# Patient Record
Sex: MALE | Race: WHITE | Employment: OTHER | URBAN - METROPOLITAN AREA
[De-identification: names, ages, dates, MRNs, and addresses within clinical notes are randomized per-mention and may not be internally consistent; named-entity substitution may affect disease eponyms.]

---

## 2018-02-23 ENCOUNTER — HOSPITAL ENCOUNTER (OUTPATIENT)
Dept: OTHER | Age: 48
Discharge: HOME | End: 2018-02-23
Attending: ANESTHESIOLOGY | Admitting: ANESTHESIOLOGY

## 2018-02-23 DIAGNOSIS — M54.2 NECK PAIN: ICD-10-CM

## 2018-02-23 DIAGNOSIS — M25.562 ACUTE PAIN OF LEFT KNEE: ICD-10-CM

## 2020-02-10 ENCOUNTER — HOSPITAL ENCOUNTER (EMERGENCY)
Age: 50
Discharge: HOME | DRG: 194 | End: 2020-02-10
Attending: EMERGENCY MEDICINE
Payer: MEDICAID

## 2020-02-10 ENCOUNTER — APPOINTMENT (OUTPATIENT)
Dept: CT IMAGING | Age: 50
DRG: 194 | End: 2020-02-10
Payer: MEDICAID

## 2020-02-10 VITALS
BODY MASS INDEX: 29.52 KG/M2 | OXYGEN SATURATION: 96 % | SYSTOLIC BLOOD PRESSURE: 117 MMHG | TEMPERATURE: 98.4 F | HEART RATE: 69 BPM | DIASTOLIC BLOOD PRESSURE: 85 MMHG | HEIGHT: 74 IN | WEIGHT: 230 LBS | RESPIRATION RATE: 16 BRPM

## 2020-02-10 LAB
A/G RATIO: 1.3 (ref 1.1–2.2)
ALBUMIN SERPL-MCNC: 4.4 G/DL (ref 3.4–5)
ALP BLD-CCNC: 83 U/L (ref 40–129)
ALT SERPL-CCNC: 27 U/L (ref 10–40)
ANION GAP SERPL CALCULATED.3IONS-SCNC: 14 MMOL/L (ref 3–16)
AST SERPL-CCNC: 18 U/L (ref 15–37)
BACTERIA: ABNORMAL /HPF
BASOPHILS ABSOLUTE: 0 K/UL (ref 0–0.2)
BASOPHILS RELATIVE PERCENT: 0.5 %
BILIRUB SERPL-MCNC: 0.6 MG/DL (ref 0–1)
BILIRUBIN URINE: ABNORMAL
BLOOD, URINE: ABNORMAL
BUN BLDV-MCNC: 14 MG/DL (ref 7–20)
CALCIUM SERPL-MCNC: 9.7 MG/DL (ref 8.3–10.6)
CHLORIDE BLD-SCNC: 101 MMOL/L (ref 99–110)
CLARITY: CLEAR
CO2: 22 MMOL/L (ref 21–32)
COLOR: YELLOW
CREAT SERPL-MCNC: 0.8 MG/DL (ref 0.9–1.3)
EKG ATRIAL RATE: 78 BPM
EKG DIAGNOSIS: NORMAL
EKG P AXIS: 30 DEGREES
EKG P-R INTERVAL: 196 MS
EKG Q-T INTERVAL: 468 MS
EKG QRS DURATION: 168 MS
EKG QTC CALCULATION (BAZETT): 533 MS
EKG R AXIS: -86 DEGREES
EKG T AXIS: 60 DEGREES
EKG VENTRICULAR RATE: 78 BPM
EOSINOPHILS ABSOLUTE: 0.3 K/UL (ref 0–0.6)
EOSINOPHILS RELATIVE PERCENT: 3.4 %
EPITHELIAL CELLS, UA: ABNORMAL /HPF
GFR AFRICAN AMERICAN: >60
GFR NON-AFRICAN AMERICAN: >60
GLOBULIN: 3.4 G/DL
GLUCOSE BLD-MCNC: 125 MG/DL (ref 70–99)
GLUCOSE URINE: NEGATIVE MG/DL
HCT VFR BLD CALC: 49.5 % (ref 40.5–52.5)
HEMOGLOBIN: 16.6 G/DL (ref 13.5–17.5)
KETONES, URINE: NEGATIVE MG/DL
LEUKOCYTE ESTERASE, URINE: NEGATIVE
LIPASE: 18 U/L (ref 13–60)
LYMPHOCYTES ABSOLUTE: 2.1 K/UL (ref 1–5.1)
LYMPHOCYTES RELATIVE PERCENT: 26.7 %
MCH RBC QN AUTO: 31.5 PG (ref 26–34)
MCHC RBC AUTO-ENTMCNC: 33.6 G/DL (ref 31–36)
MCV RBC AUTO: 93.9 FL (ref 80–100)
MICROSCOPIC EXAMINATION: YES
MONOCYTES ABSOLUTE: 0.6 K/UL (ref 0–1.3)
MONOCYTES RELATIVE PERCENT: 7.7 %
MUCUS: ABNORMAL /LPF
NEUTROPHILS ABSOLUTE: 4.9 K/UL (ref 1.7–7.7)
NEUTROPHILS RELATIVE PERCENT: 61.7 %
NITRITE, URINE: NEGATIVE
PDW BLD-RTO: 12.3 % (ref 12.4–15.4)
PH UA: 5 (ref 5–8)
PLATELET # BLD: 180 K/UL (ref 135–450)
PMV BLD AUTO: 10.2 FL (ref 5–10.5)
POTASSIUM REFLEX MAGNESIUM: 4 MMOL/L (ref 3.5–5.1)
PROTEIN UA: ABNORMAL MG/DL
RBC # BLD: 5.26 M/UL (ref 4.2–5.9)
RBC UA: ABNORMAL /HPF (ref 0–2)
SODIUM BLD-SCNC: 137 MMOL/L (ref 136–145)
SPECIFIC GRAVITY UA: >=1.03 (ref 1–1.03)
TOTAL PROTEIN: 7.8 G/DL (ref 6.4–8.2)
TROPONIN: <0.01 NG/ML
URINE REFLEX TO CULTURE: ABNORMAL
URINE TYPE: ABNORMAL
UROBILINOGEN, URINE: 0.2 E.U./DL
WBC # BLD: 8 K/UL (ref 4–11)
WBC UA: ABNORMAL /HPF (ref 0–5)

## 2020-02-10 PROCEDURE — 71260 CT THORAX DX C+: CPT

## 2020-02-10 PROCEDURE — 96375 TX/PRO/DX INJ NEW DRUG ADDON: CPT

## 2020-02-10 PROCEDURE — 6360000004 HC RX CONTRAST MEDICATION: Performed by: PHYSICIAN ASSISTANT

## 2020-02-10 PROCEDURE — 81001 URINALYSIS AUTO W/SCOPE: CPT

## 2020-02-10 PROCEDURE — 6360000002 HC RX W HCPCS: Performed by: PHYSICIAN ASSISTANT

## 2020-02-10 PROCEDURE — 36415 COLL VENOUS BLD VENIPUNCTURE: CPT

## 2020-02-10 PROCEDURE — 85025 COMPLETE CBC W/AUTO DIFF WBC: CPT

## 2020-02-10 PROCEDURE — 74177 CT ABD & PELVIS W/CONTRAST: CPT

## 2020-02-10 PROCEDURE — 2500000003 HC RX 250 WO HCPCS: Performed by: PHYSICIAN ASSISTANT

## 2020-02-10 PROCEDURE — 83690 ASSAY OF LIPASE: CPT

## 2020-02-10 PROCEDURE — 93010 ELECTROCARDIOGRAM REPORT: CPT | Performed by: INTERNAL MEDICINE

## 2020-02-10 PROCEDURE — 99285 EMERGENCY DEPT VISIT HI MDM: CPT

## 2020-02-10 PROCEDURE — 93005 ELECTROCARDIOGRAM TRACING: CPT | Performed by: PHYSICIAN ASSISTANT

## 2020-02-10 PROCEDURE — 2580000003 HC RX 258: Performed by: PHYSICIAN ASSISTANT

## 2020-02-10 PROCEDURE — 96374 THER/PROPH/DIAG INJ IV PUSH: CPT

## 2020-02-10 PROCEDURE — 80053 COMPREHEN METABOLIC PANEL: CPT

## 2020-02-10 PROCEDURE — 84484 ASSAY OF TROPONIN QUANT: CPT

## 2020-02-10 RX ORDER — KETOROLAC TROMETHAMINE 30 MG/ML
15 INJECTION, SOLUTION INTRAMUSCULAR; INTRAVENOUS ONCE
Status: COMPLETED | OUTPATIENT
Start: 2020-02-10 | End: 2020-02-10

## 2020-02-10 RX ORDER — 0.9 % SODIUM CHLORIDE 0.9 %
1000 INTRAVENOUS SOLUTION INTRAVENOUS ONCE
Status: COMPLETED | OUTPATIENT
Start: 2020-02-10 | End: 2020-02-10

## 2020-02-10 RX ORDER — ONDANSETRON 2 MG/ML
4 INJECTION INTRAMUSCULAR; INTRAVENOUS ONCE
Status: COMPLETED | OUTPATIENT
Start: 2020-02-10 | End: 2020-02-10

## 2020-02-10 RX ORDER — ONDANSETRON 4 MG/1
4 TABLET, FILM COATED ORAL EVERY 8 HOURS PRN
Qty: 10 TABLET | Refills: 0 | Status: SHIPPED | OUTPATIENT
Start: 2020-02-10 | End: 2020-02-12

## 2020-02-10 RX ADMIN — FAMOTIDINE 20 MG: 10 INJECTION, SOLUTION INTRAVENOUS at 14:12

## 2020-02-10 RX ADMIN — IOPAMIDOL 75 ML: 755 INJECTION, SOLUTION INTRAVENOUS at 14:01

## 2020-02-10 RX ADMIN — ONDANSETRON HYDROCHLORIDE 4 MG: 2 INJECTION, SOLUTION INTRAMUSCULAR; INTRAVENOUS at 14:11

## 2020-02-10 RX ADMIN — KETOROLAC TROMETHAMINE 15 MG: 30 INJECTION, SOLUTION INTRAMUSCULAR; INTRAVENOUS at 15:22

## 2020-02-10 RX ADMIN — SODIUM CHLORIDE 1000 ML: 9 INJECTION, SOLUTION INTRAVENOUS at 14:10

## 2020-02-10 ASSESSMENT — ENCOUNTER SYMPTOMS
SPUTUM PRODUCTION: 0
BLOOD IN STOOL: 0
NAUSEA: 1
COUGH: 0
SHORTNESS OF BREATH: 1
ABDOMINAL PAIN: 1
VOMITING: 0
DIARRHEA: 0

## 2020-02-10 ASSESSMENT — PAIN SCALES - GENERAL
PAINLEVEL_OUTOF10: 7
PAINLEVEL_OUTOF10: 0
PAINLEVEL_OUTOF10: 6

## 2020-02-10 ASSESSMENT — PAIN DESCRIPTION - DESCRIPTORS: DESCRIPTORS: SHARP

## 2020-02-10 ASSESSMENT — PAIN DESCRIPTION - PROGRESSION: CLINICAL_PROGRESSION: GRADUALLY WORSENING

## 2020-02-10 ASSESSMENT — PAIN - FUNCTIONAL ASSESSMENT: PAIN_FUNCTIONAL_ASSESSMENT: ACTIVITIES ARE NOT PREVENTED

## 2020-02-10 ASSESSMENT — PAIN DESCRIPTION - LOCATION: LOCATION: ABDOMEN

## 2020-02-10 ASSESSMENT — PAIN DESCRIPTION - ORIENTATION: ORIENTATION: RIGHT;LOWER

## 2020-02-10 ASSESSMENT — PAIN DESCRIPTION - FREQUENCY: FREQUENCY: CONTINUOUS

## 2020-02-10 NOTE — ED NOTES
Pt states he does feel worse and nauseated after ct. Medicated as ordered.       Lexx Rivera RN  02/10/20 2305
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
## 2020-02-12 ENCOUNTER — APPOINTMENT (OUTPATIENT)
Dept: GENERAL RADIOLOGY | Age: 50
DRG: 194 | End: 2020-02-12
Payer: MEDICAID

## 2020-02-12 ENCOUNTER — APPOINTMENT (OUTPATIENT)
Dept: CT IMAGING | Age: 50
DRG: 194 | End: 2020-02-12
Payer: MEDICAID

## 2020-02-12 ENCOUNTER — HOSPITAL ENCOUNTER (INPATIENT)
Age: 50
LOS: 3 days | Discharge: HOME | DRG: 194 | End: 2020-02-15
Attending: EMERGENCY MEDICINE | Admitting: HOSPITALIST
Payer: MEDICAID

## 2020-02-12 PROBLEM — I50.43 CHF (CONGESTIVE HEART FAILURE), NYHA CLASS I, ACUTE ON CHRONIC, COMBINED (HCC): Status: ACTIVE | Noted: 2020-02-12

## 2020-02-12 LAB
A/G RATIO: 1.5 (ref 1.1–2.2)
ALBUMIN SERPL-MCNC: 4.4 G/DL (ref 3.4–5)
ALP BLD-CCNC: 80 U/L (ref 40–129)
ALT SERPL-CCNC: 44 U/L (ref 10–40)
ANION GAP SERPL CALCULATED.3IONS-SCNC: 12 MMOL/L (ref 3–16)
AST SERPL-CCNC: 28 U/L (ref 15–37)
BASOPHILS ABSOLUTE: 0.1 K/UL (ref 0–0.2)
BASOPHILS RELATIVE PERCENT: 0.7 %
BILIRUB SERPL-MCNC: 0.9 MG/DL (ref 0–1)
BUN BLDV-MCNC: 14 MG/DL (ref 7–20)
CALCIUM SERPL-MCNC: 9.4 MG/DL (ref 8.3–10.6)
CHLORIDE BLD-SCNC: 94 MMOL/L (ref 99–110)
CO2: 24 MMOL/L (ref 21–32)
CREAT SERPL-MCNC: 0.9 MG/DL (ref 0.9–1.3)
D DIMER: 827 NG/ML DDU (ref 0–229)
EKG ATRIAL RATE: 97 BPM
EKG DIAGNOSIS: NORMAL
EKG P AXIS: 44 DEGREES
EKG P-R INTERVAL: 186 MS
EKG Q-T INTERVAL: 390 MS
EKG QRS DURATION: 142 MS
EKG QTC CALCULATION (BAZETT): 495 MS
EKG R AXIS: -75 DEGREES
EKG T AXIS: 42 DEGREES
EKG VENTRICULAR RATE: 97 BPM
EOSINOPHILS ABSOLUTE: 0.1 K/UL (ref 0–0.6)
EOSINOPHILS RELATIVE PERCENT: 0.8 %
GFR AFRICAN AMERICAN: >60
GFR NON-AFRICAN AMERICAN: >60
GLOBULIN: 3 G/DL
GLUCOSE BLD-MCNC: 110 MG/DL (ref 70–99)
HCT VFR BLD CALC: 45.8 % (ref 40.5–52.5)
HEMOGLOBIN: 15.7 G/DL (ref 13.5–17.5)
LYMPHOCYTES ABSOLUTE: 1.4 K/UL (ref 1–5.1)
LYMPHOCYTES RELATIVE PERCENT: 12.9 %
MCH RBC QN AUTO: 32.4 PG (ref 26–34)
MCHC RBC AUTO-ENTMCNC: 34.3 G/DL (ref 31–36)
MCV RBC AUTO: 94.3 FL (ref 80–100)
MONOCYTES ABSOLUTE: 0.7 K/UL (ref 0–1.3)
MONOCYTES RELATIVE PERCENT: 5.9 %
NEUTROPHILS ABSOLUTE: 8.9 K/UL (ref 1.7–7.7)
NEUTROPHILS RELATIVE PERCENT: 79.7 %
PDW BLD-RTO: 12.5 % (ref 12.4–15.4)
PLATELET # BLD: 153 K/UL (ref 135–450)
PMV BLD AUTO: 10.8 FL (ref 5–10.5)
POTASSIUM REFLEX MAGNESIUM: 4.1 MMOL/L (ref 3.5–5.1)
PRO-BNP: 1364 PG/ML (ref 0–124)
RBC # BLD: 4.86 M/UL (ref 4.2–5.9)
SODIUM BLD-SCNC: 130 MMOL/L (ref 136–145)
TOTAL PROTEIN: 7.4 G/DL (ref 6.4–8.2)
TROPONIN: <0.01 NG/ML
TROPONIN: <0.01 NG/ML
WBC # BLD: 11.2 K/UL (ref 4–11)

## 2020-02-12 PROCEDURE — 93005 ELECTROCARDIOGRAM TRACING: CPT | Performed by: EMERGENCY MEDICINE

## 2020-02-12 PROCEDURE — 99285 EMERGENCY DEPT VISIT HI MDM: CPT

## 2020-02-12 PROCEDURE — 96374 THER/PROPH/DIAG INJ IV PUSH: CPT

## 2020-02-12 PROCEDURE — 85025 COMPLETE CBC W/AUTO DIFF WBC: CPT

## 2020-02-12 PROCEDURE — 80053 COMPREHEN METABOLIC PANEL: CPT

## 2020-02-12 PROCEDURE — 93010 ELECTROCARDIOGRAM REPORT: CPT | Performed by: INTERNAL MEDICINE

## 2020-02-12 PROCEDURE — 36415 COLL VENOUS BLD VENIPUNCTURE: CPT

## 2020-02-12 PROCEDURE — 71260 CT THORAX DX C+: CPT

## 2020-02-12 PROCEDURE — 2060000000 HC ICU INTERMEDIATE R&B

## 2020-02-12 PROCEDURE — 83880 ASSAY OF NATRIURETIC PEPTIDE: CPT

## 2020-02-12 PROCEDURE — 71046 X-RAY EXAM CHEST 2 VIEWS: CPT

## 2020-02-12 PROCEDURE — 85379 FIBRIN DEGRADATION QUANT: CPT

## 2020-02-12 PROCEDURE — 6360000004 HC RX CONTRAST MEDICATION: Performed by: NURSE PRACTITIONER

## 2020-02-12 PROCEDURE — 84484 ASSAY OF TROPONIN QUANT: CPT

## 2020-02-12 PROCEDURE — 6360000002 HC RX W HCPCS: Performed by: NURSE PRACTITIONER

## 2020-02-12 RX ORDER — FUROSEMIDE 10 MG/ML
20 INJECTION INTRAMUSCULAR; INTRAVENOUS 2 TIMES DAILY
Status: DISCONTINUED | OUTPATIENT
Start: 2020-02-13 | End: 2020-02-13

## 2020-02-12 RX ORDER — SODIUM CHLORIDE 0.9 % (FLUSH) 0.9 %
10 SYRINGE (ML) INJECTION EVERY 12 HOURS SCHEDULED
Status: DISCONTINUED | OUTPATIENT
Start: 2020-02-12 | End: 2020-02-15 | Stop reason: HOSPADM

## 2020-02-12 RX ORDER — ONDANSETRON 2 MG/ML
4 INJECTION INTRAMUSCULAR; INTRAVENOUS EVERY 6 HOURS PRN
Status: DISCONTINUED | OUTPATIENT
Start: 2020-02-12 | End: 2020-02-15 | Stop reason: HOSPADM

## 2020-02-12 RX ORDER — FUROSEMIDE 10 MG/ML
40 INJECTION INTRAMUSCULAR; INTRAVENOUS ONCE
Status: COMPLETED | OUTPATIENT
Start: 2020-02-12 | End: 2020-02-12

## 2020-02-12 RX ORDER — ASPIRIN 81 MG/1
81 TABLET, CHEWABLE ORAL DAILY
Status: DISCONTINUED | OUTPATIENT
Start: 2020-02-13 | End: 2020-02-15 | Stop reason: HOSPADM

## 2020-02-12 RX ORDER — SODIUM CHLORIDE 0.9 % (FLUSH) 0.9 %
10 SYRINGE (ML) INJECTION PRN
Status: DISCONTINUED | OUTPATIENT
Start: 2020-02-12 | End: 2020-02-15 | Stop reason: HOSPADM

## 2020-02-12 RX ORDER — ATORVASTATIN CALCIUM 40 MG/1
40 TABLET, FILM COATED ORAL NIGHTLY
Status: DISCONTINUED | OUTPATIENT
Start: 2020-02-12 | End: 2020-02-15 | Stop reason: HOSPADM

## 2020-02-12 RX ADMIN — FUROSEMIDE 40 MG: 10 INJECTION, SOLUTION INTRAMUSCULAR; INTRAVENOUS at 18:21

## 2020-02-12 RX ADMIN — IOPAMIDOL 85 ML: 755 INJECTION, SOLUTION INTRAVENOUS at 19:21

## 2020-02-12 ASSESSMENT — ENCOUNTER SYMPTOMS
SORE THROAT: 0
COLOR CHANGE: 0
SHORTNESS OF BREATH: 1
ABDOMINAL PAIN: 0
RHINORRHEA: 0

## 2020-02-12 ASSESSMENT — PAIN DESCRIPTION - DESCRIPTORS: DESCRIPTORS: ACHING

## 2020-02-12 ASSESSMENT — PAIN SCALES - GENERAL
PAINLEVEL_OUTOF10: 4
PAINLEVEL_OUTOF10: 4

## 2020-02-12 ASSESSMENT — PAIN DESCRIPTION - LOCATION: LOCATION: ABDOMEN

## 2020-02-12 NOTE — ED PROVIDER NOTES
[address] ED  eMERGENCY dEPARTMENT eNCOUnter        Pt Name: Juliano Fields  MRN: [id number]  [place] 1970  Date of evaluation: 2/12/2020  Provider: ERVIN Manzo CNP  PCP: No primary care provider on file. ED Attending: No att. providers found    [address]       Chief Complaint   Patient presents with    Shortness of Breath     since thrusday. denies cp. nonproductive cough    Fall     on thursday no loc       HISTORY OF PRESENT ILLNESS   (Location/Symptom, Timing/Onset, Context/Setting, Quality, Duration, Modifying Factors, Severity)  Note limiting factors. Juliano Fields is a 47 y.o. male for shortness of breath. Onset was 1 week. Duration has been since the onset. Context includes pt states he has had sob for the past week. He states it is worse when he lays flat. Alleviating factors include nothing. Aggravating factors include nothing. Pain is 4/10. nothing has been used for pain today. Nursing Notes were all reviewed and agreed with or any disagreements were addressed  in the HPI. REVIEW OF SYSTEMS  (2-9 systems for level 4, 10 or more for level 5)     Review of Systems   Constitutional: Negative for fever. HENT: Negative for congestion, rhinorrhea and sore throat. Respiratory: Positive for shortness of breath. Cardiovascular: Negative for chest pain. Gastrointestinal: Negative for abdominal pain. Genitourinary: Negative for decreased urine volume and difficulty urinating. Musculoskeletal: Negative for arthralgias and myalgias. Skin: Negative for color change and rash. Neurological: Negative for dizziness and light-headedness. Psychiatric/Behavioral: Negative for agitation. All other systems reviewed and are negative. Positivesand Pertinent negatives as per HPI. Except as noted above in the ROS, all other systems were reviewed and negative.        PAST MEDICAL HISTORY     Past Medical History:   Diagnosis Date  CAD (coronary artery disease)     Heart attack ([facility] [address]     Neck pain          SURGICAL HISTORY       Past Surgical History:   Procedure Laterality Date    CARDIAC SURGERY           CURRENT MEDICATIONS       Previous Medications    TRAMADOL (ULTRAM) 50 MG TABLET    Take 100 mg by mouth every 8 hours as needed for Pain. ALLERGIES     Patient has no known allergies. FAMILY HISTORY     No family history on file. SOCIAL HISTORY       Social History     Socioeconomic History    Marital status:      Spouse name: Not on file    Number of children: Not on file    Years of education: Not on file    Highest education level: Not on file   Occupational History    Not on file   Social Needs    Financial resource strain: Not on file    Food insecurity:     Worry: Not on file     Inability: Not on file    Transportation needs:     Medical: Not on file     Non-medical: Not on file   Tobacco Use    Smoking status: Current Every Day Smoker     Packs/day: 1.00     Years: 28.00     Pack years: 28.00     Types: Cigarettes    Smokeless tobacco: Never Used   Substance and Sexual Activity    Alcohol use:  Yes     Alcohol/week: 2.0 standard drinks     Types: 2 Shots of liquor per week    Drug use: No    Sexual activity: Yes     Partners: Female   Lifestyle    Physical activity:     Days per week: Not on file     Minutes per session: Not on file    Stress: Not on file   Relationships    Social connections:     Talks on phone: Not on file     Gets together: Not on file     Attends Hinduism service: Not on file     Active member of club or organization: Not on file     Attends meetings of clubs or organizations: Not on file     Relationship status: Not on file    Intimate partner violence:     Fear of current or ex partner: Not on file     Emotionally abused: Not on file     Physically abused: Not on file     Forced sexual activity: Not on file   Other Topics Concern    Not on file   Social History within normal limits    Narrative:     Performed at:  [facility]) - [place] [address],  [address] [place] [address]   Phone [phone]   BRAIN NATRIURETIC PEPTIDE - Abnormal; Notable for the following components:    Pro-BNP 1,364 (*)     All other components within normal limits    Narrative:     Performed at:  [place] [address],  [address] [place] [address]   Phone [phone]   D-DIMER, QUANTITATIVE - Abnormal; Notable for the following components:    D-Dimer, Quant 827 (*)     All other components within normal limits    Narrative:     Performed at:  [address]   Phone [phone]   TROPONIN    Narrative:     Performed at:  [facility]) - [address]   Phone [phone]       All other labs were within normal range or notreturned as of this dictation. EKG: All EKG's are interpreted by the Emergency Department Physician who either signs or Co-signs this chart in the absence of a cardiologist.  Please see their note for interpretation of EKG. RADIOLOGY:   Chest x-ray interpreted by radiologist for findings suggest congestive heart failure. CTA of his chest for pulmonary embolism interpreted by radiologist for  Impression:    No evidence of pulmonary embolism to the proximal segmental level. Cardiomegaly with interval development of small bilateral pleural effusions  and pulmonary edema. Interpretation per the Radiologist below, if available at the time of this note:    CT CHEST PULMONARY EMBOLISM W CONTRAST   Final Result   No evidence of pulmonary embolism to the proximal segmental level. Cardiomegaly with interval development of small bilateral pleural effusions   and pulmonary edema.          XR CHEST STANDARD (2 VW)   Final Result   Findings suggest congestive heart failure Xr Chest Standard (2 Vw)    Result Date: 2/12/2020  EXAMINATION: TWO XRAY VIEWS OF THE CHEST 2/12/2020 4:40 pm COMPARISON: 02/10/2020 chest CT HISTORY: ORDERING SYSTEM PROVIDED HISTORY: sob TECHNOLOGIST PROVIDED HISTORY: Reason for exam:->sob Reason for Exam: Shortness of Breath (since thrusday. denies cp. nonproductive cough) Acuity: Acute Type of Exam: Initial FINDINGS: Cardiomegaly. Pulmonary vascular congestion. Pulmonary edema. No focal pulmonary consolidation. No pleural effusion. Findings suggest congestive heart failure         PROCEDURES   Unless otherwise noted below, none     Procedures    CRITICAL CARE TIME   N/A    CONSULTS:  IP CONSULT TO HOSPITALIST  IP CONSULT TO DIETITIAN  IP CONSULT TO CARDIOLOGY      EMERGENCY DEPARTMENT COURSE and DIFFERENTIAL DIAGNOSIS/MDM:   Vitals:    Vitals:    02/12/20 1624 02/12/20 1628   BP:  (!) 132/96   Pulse:  88   Resp:  26   Temp:  98.3 °F (36.8 °C)   TempSrc:  Oral   SpO2:  96%   Weight: 230 lb (104.3 kg)    Height: 6' 2\" (1.88 m)        Patient was given the following medications:  Medications   furosemide (LASIX) injection 40 mg (40 mg Intravenous Given 2/12/20 1821)   iopamidol (ISOVUE-370) 76 % injection 85 mL (85 mLs Intravenous Given 2/12/20 1921)       Patient was seen and evaluated by Dr. Eh Elliott and myself. Patient here today for complaints of shortness of breath. Patient reports he has had shortness of breath for approximately a week. He also reports he is not been able to lay flat when he sleeps. Patient denies any chest pain. On exam he is awake and alert he is having slight shortness of breath with talking but states it is worse when he ambulates. Lab values have been reviewed and interpreted. Patient did have an elevated BNP at 1364. He was given Lasix and his IV for this.   He did also have an elevated d-dimer and a CT of his chest was negative for PE however it was concerning for cardiomegaly with bilateral pleural effusions and pulmonary edema. Consult was placed to the hospitalist for admission. Hospitalist is accepted. Patient's care was transitioned to the inpatient unit at this time. The patient tolerated their visit well. They were seen and evaluated by the attendingphysician, No att. providers found who agreed with the assessment and plan. The patient and / or the family were informed of the results of any tests, a time was given to answer questions, a plan was proposed and they agreed Lauro Alba. FINAL IMPRESSION      1. Acute pulmonary edema (HCC)    2. Cardiomegaly    3. Dyspnea on exertion    4. Elevated brain natriuretic peptide (BNP) level          DISPOSITION/PLAN   DISPOSITION Admitted 02/12/2020 06:35:32 PM      PATIENT REFERRED TO:  No follow-up provider specified.     DISCHARGE MEDICATIONS:  New Prescriptions    No medications on file       DISCONTINUED MEDICATIONS:  Discontinued Medications    ONDANSETRON (ZOFRAN) 4 MG TABLET    Take 1 tablet by mouth every 8 hours as needed for Nausea              (Please note that portions of this note were completed with a voice recognition program.  Efforts were made to edit the dictations but occasionally words are mis-transcribed.)    ERVIN Ohara CNP (electronically signed)       ERVIN Ohara CNP  02/12/20 9272

## 2020-02-13 ENCOUNTER — APPOINTMENT (OUTPATIENT)
Dept: NUCLEAR MEDICINE | Age: 50
DRG: 194 | End: 2020-02-13
Payer: MEDICAID

## 2020-02-13 LAB
ANION GAP SERPL CALCULATED.3IONS-SCNC: 15 MMOL/L (ref 3–16)
BUN BLDV-MCNC: 14 MG/DL (ref 7–20)
CALCIUM SERPL-MCNC: 9 MG/DL (ref 8.3–10.6)
CHLORIDE BLD-SCNC: 96 MMOL/L (ref 99–110)
CO2: 21 MMOL/L (ref 21–32)
CREAT SERPL-MCNC: 0.9 MG/DL (ref 0.9–1.3)
EKG ATRIAL RATE: 95 BPM
EKG DIAGNOSIS: NORMAL
EKG P AXIS: 58 DEGREES
EKG P-R INTERVAL: 194 MS
EKG Q-T INTERVAL: 400 MS
EKG QRS DURATION: 144 MS
EKG QTC CALCULATION (BAZETT): 502 MS
EKG R AXIS: -79 DEGREES
EKG T AXIS: 36 DEGREES
EKG VENTRICULAR RATE: 95 BPM
GFR AFRICAN AMERICAN: >60
GFR NON-AFRICAN AMERICAN: >60
GLUCOSE BLD-MCNC: 100 MG/DL (ref 70–99)
LV EF: 23 %
LV EF: 27 %
LVEF MODALITY: NORMAL
LVEF MODALITY: NORMAL
MAGNESIUM: 2.3 MG/DL (ref 1.8–2.4)
POTASSIUM SERPL-SCNC: 4.7 MMOL/L (ref 3.5–5.1)
REASON FOR REJECTION: NORMAL
REJECTED TEST: NORMAL
SODIUM BLD-SCNC: 132 MMOL/L (ref 136–145)
T4 FREE: 1.4 NG/DL (ref 0.9–1.8)
TROPONIN: <0.01 NG/ML
TSH SERPL DL<=0.05 MIU/L-ACNC: 6.15 UIU/ML (ref 0.27–4.2)

## 2020-02-13 PROCEDURE — 83735 ASSAY OF MAGNESIUM: CPT

## 2020-02-13 PROCEDURE — 84439 ASSAY OF FREE THYROXINE: CPT

## 2020-02-13 PROCEDURE — 6370000000 HC RX 637 (ALT 250 FOR IP): Performed by: HOSPITALIST

## 2020-02-13 PROCEDURE — 99223 1ST HOSP IP/OBS HIGH 75: CPT | Performed by: INTERNAL MEDICINE

## 2020-02-13 PROCEDURE — 84484 ASSAY OF TROPONIN QUANT: CPT

## 2020-02-13 PROCEDURE — 80048 BASIC METABOLIC PNL TOTAL CA: CPT

## 2020-02-13 PROCEDURE — 6360000002 HC RX W HCPCS: Performed by: INTERNAL MEDICINE

## 2020-02-13 PROCEDURE — 6370000000 HC RX 637 (ALT 250 FOR IP): Performed by: NURSE PRACTITIONER

## 2020-02-13 PROCEDURE — 2060000000 HC ICU INTERMEDIATE R&B

## 2020-02-13 PROCEDURE — 3430000000 HC RX DIAGNOSTIC RADIOPHARMACEUTICAL: Performed by: INTERNAL MEDICINE

## 2020-02-13 PROCEDURE — A9502 TC99M TETROFOSMIN: HCPCS | Performed by: INTERNAL MEDICINE

## 2020-02-13 PROCEDURE — 6360000004 HC RX CONTRAST MEDICATION: Performed by: INTERNAL MEDICINE

## 2020-02-13 PROCEDURE — 93010 ELECTROCARDIOGRAM REPORT: CPT | Performed by: INTERNAL MEDICINE

## 2020-02-13 PROCEDURE — C8929 TTE W OR WO FOL WCON,DOPPLER: HCPCS

## 2020-02-13 PROCEDURE — 36415 COLL VENOUS BLD VENIPUNCTURE: CPT

## 2020-02-13 PROCEDURE — 84443 ASSAY THYROID STIM HORMONE: CPT

## 2020-02-13 PROCEDURE — 93017 CV STRESS TEST TRACING ONLY: CPT

## 2020-02-13 PROCEDURE — 2580000003 HC RX 258: Performed by: INTERNAL MEDICINE

## 2020-02-13 PROCEDURE — 99254 IP/OBS CNSLTJ NEW/EST MOD 60: CPT | Performed by: INTERNAL MEDICINE

## 2020-02-13 PROCEDURE — 78452 HT MUSCLE IMAGE SPECT MULT: CPT

## 2020-02-13 RX ORDER — TRAMADOL HYDROCHLORIDE 50 MG/1
100 TABLET ORAL EVERY 8 HOURS PRN
Status: DISCONTINUED | OUTPATIENT
Start: 2020-02-13 | End: 2020-02-15 | Stop reason: HOSPADM

## 2020-02-13 RX ORDER — FUROSEMIDE 10 MG/ML
40 INJECTION INTRAMUSCULAR; INTRAVENOUS 2 TIMES DAILY
Status: DISCONTINUED | OUTPATIENT
Start: 2020-02-13 | End: 2020-02-14

## 2020-02-13 RX ADMIN — Medication 10 ML: at 08:37

## 2020-02-13 RX ADMIN — REGADENOSON 0.4 MG: 0.08 INJECTION, SOLUTION INTRAVENOUS at 14:58

## 2020-02-13 RX ADMIN — Medication 10 ML: at 00:28

## 2020-02-13 RX ADMIN — TETROFOSMIN 29.9 MILLICURIE: 1.38 INJECTION, POWDER, LYOPHILIZED, FOR SOLUTION INTRAVENOUS at 14:58

## 2020-02-13 RX ADMIN — ENOXAPARIN SODIUM 100 MG: 100 INJECTION SUBCUTANEOUS at 21:42

## 2020-02-13 RX ADMIN — ENOXAPARIN SODIUM 100 MG: 100 INJECTION SUBCUTANEOUS at 11:42

## 2020-02-13 RX ADMIN — PERFLUTREN 1.65 MG: 6.52 INJECTION, SUSPENSION INTRAVENOUS at 09:48

## 2020-02-13 RX ADMIN — ATORVASTATIN CALCIUM 40 MG: 40 TABLET, FILM COATED ORAL at 00:28

## 2020-02-13 RX ADMIN — ATORVASTATIN CALCIUM 40 MG: 40 TABLET, FILM COATED ORAL at 21:42

## 2020-02-13 RX ADMIN — FUROSEMIDE 40 MG: 10 INJECTION, SOLUTION INTRAMUSCULAR; INTRAVENOUS at 17:41

## 2020-02-13 RX ADMIN — TETROFOSMIN 11.2 MILLICURIE: 1.38 INJECTION, POWDER, LYOPHILIZED, FOR SOLUTION INTRAVENOUS at 12:54

## 2020-02-13 RX ADMIN — Medication 10 ML: at 17:41

## 2020-02-13 RX ADMIN — TRAMADOL HYDROCHLORIDE 100 MG: 50 TABLET, FILM COATED ORAL at 11:42

## 2020-02-13 RX ADMIN — ASPIRIN 81 MG 81 MG: 81 TABLET ORAL at 09:48

## 2020-02-13 RX ADMIN — FUROSEMIDE 20 MG: 10 INJECTION, SOLUTION INTRAMUSCULAR; INTRAVENOUS at 09:48

## 2020-02-13 RX ADMIN — Medication 10 ML: at 21:43

## 2020-02-13 ASSESSMENT — PAIN DESCRIPTION - DESCRIPTORS: DESCRIPTORS: ACHING

## 2020-02-13 ASSESSMENT — PAIN DESCRIPTION - PAIN TYPE: TYPE: CHRONIC PAIN

## 2020-02-13 ASSESSMENT — PAIN SCALES - GENERAL: PAINLEVEL_OUTOF10: 7

## 2020-02-13 ASSESSMENT — PAIN DESCRIPTION - LOCATION: LOCATION: LEG

## 2020-02-13 ASSESSMENT — PAIN DESCRIPTION - ORIENTATION: ORIENTATION: LOWER;LEFT;RIGHT

## 2020-02-13 NOTE — PROGRESS NOTES
Pt refusing 4 eyes assessment, states no skin issues. End of shift report given to [facility], RN. Transfer of care done at this time.

## 2020-02-13 NOTE — PROGRESS NOTES
Influenza vaccination was refused by patient/caregiver one or more times during this hospitalization. Benefits of vaccine were discussed with pt, he acknowledges benefits but still declines at this time. Patient is not able to demonstrated the ability to move from a reclining position to an upright position within the recliner.  however patient is alert, oriented and able to provide informed consent

## 2020-02-13 NOTE — PROGRESS NOTES
Assessment complete. Vital signs stable on room air. Call light within reach. ID band and tele intact. Medications held at this time pending Cardiology consult. Denies any needs at this time. Will continue to monitor.

## 2020-02-13 NOTE — PROGRESS NOTES
Patient's EF (Ejection Fraction) is less than 40%    Patient's weights and intake/output reviewed:    Patient's Last Weight: 213 lbs obtained by standing scale. Difference of 17 lbs less than last documented weight. Intake/Output Summary (Last 24 hours) at 2/13/2020 1600  Last data filed at 2/13/2020 1350  Gross per 24 hour   Intake 0 ml   Output 650 ml   Net -650 ml         Pt is currently on room air. . Pt with complaints of shortness of breath. Pt without lower extremity edema. Patient and/or Family's stated Goal of Care this Admission: reduce shortness of breath, increase activity tolerance, better understand heart failure and disease management and be more comfortable prior to discharge      Comorbidities Reviewed Yes  Patient has a past medical history of CAD (coronary artery disease), Heart attack ([address] and Neck pain.          >>For CHF and Comorbidity documentation on Education Time and Topics, please see Education Tab

## 2020-02-13 NOTE — PROGRESS NOTES
Progress Note    Admit Date:  2/12/2020    47year old male presented with c/o worsening shortness of breath and dyspnea on exertion. Subjective:  Mr. Erika Daniels reports his shortness of breath is improved. He is stable on RA. Objective:   Patient Vitals for the past 4 hrs:   BP Temp Temp src Pulse Resp SpO2   02/13/20 0833 109/72 97.9 °F (36.6 °C) Oral 69 19 94 %            Intake/Output Summary (Last 24 hours) at 2/13/2020 1128  Last data filed at 2/13/2020 1050  Gross per 24 hour   Intake 240 ml   Output 400 ml   Net -160 ml       Physical Exam:  Gen: No distress. Alert. Eyes: PERRL. No sclera icterus. No conjunctival injection. ENT: No discharge. Pharynx clear. Neck: Trachea midline. Resp: No accessory muscle use. No crackles. No wheezes. No rhonchi. CV: Regular rate. Regular rhythm. No murmur. No rub. No edema. Capillary Refill: Brisk,< 3 seconds   Peripheral Pulses: +2 palpable, equal bilaterally   GI: Non-tender. Non-distended. Normal bowel sounds. No hernia. Skin: Warm and dry. No nodule on exposed extremities. No rash on exposed extremities. M/S: No cyanosis. No joint deformity. No clubbing. Neuro: Awake. Grossly nonfocal    Psych: Oriented x 3. No anxiety or agitation      Data:  CBC:   Recent Labs     02/10/20  1310 02/12/20  1630   WBC 8.0 11.2*   HGB 16.6 15.7   HCT 49.5 45.8   MCV 93.9 94.3    153     BMP:   Recent Labs     02/10/20  1310 02/12/20  1630 02/13/20  0443    130* 132*   K 4.0 4.1 4.7    94* 96*   CO2 22 24 21   BUN 14 14 14   CREATININE 0.8* 0.9 0.9     LIVER PROFILE:   Recent Labs     02/10/20  1310 02/12/20  1630   AST 18 28   ALT 27 44*   LIPASE 18.0  --    BILITOT 0.6 0.9   ALKPHOS 83 80     PT/INR: No results for input(s): PROTIME, INR in the last 72 hours. CULTURES  N/A    RADIOLOGY  CT CHEST PULMONARY EMBOLISM W CONTRAST   Final Result   No evidence of pulmonary embolism to the proximal segmental level.       Cardiomegaly with interval development of small bilateral pleural effusions   and pulmonary edema. XR CHEST STANDARD (2 VW)   Final Result   Findings suggest congestive heart failure         NM Cardiac Stress Test Nuclear Imaging    (Results Pending)     NM Stress 2/13/2020  Pending      Echo 2/13/2020  Summary   LV systolic function is severely reduced with an EF of 20-25 %. There is more prominent HK of the inferolateral and apical segments. There is a small-medium sized apical thrombus seen with and without use of   Definity contrast.   Left ventricular cavity size is mildly dilated. Grade II diastolic dysfunction with elevated left ventricular filling   pressure. The left atrium is severely dilated. The right ventricle is mildly enlarged. Aortic valve appears sclerotic but opens adequately. Mild mitral regurgitation. Systolic pulmonary artery pressure (SPAP) is normal estimated at 16mmHg   (Right atrial pressure of 8mmHg). Barb Cid have reviewed the chart on [facility] and personally interviewed and examined patient, reviewed the data (labs and imaging) and after discussion with my PA formulated the plan. Agree with note with the following edits. HPI:     62-year-old white male with a history of CAD who has not seen a physician in the last 8 years. He had a stent placed about 8 years ago and was seeing cardiology. In the last 6 months he has noticed increasing shortness of breath with exertion. Denies any chest pain. He finally came to the ER. Work-up showed acute congestive heart failure. He was started on Lasix. Echo done today showed an EF of 25%. No chest pain. Eating better since he has been diuresed. I reviewed the patient's Past Medical History, Past Surgical History, Medications, and Allergies.      Physical exam:    /72   Pulse 69   Temp 97.9 °F (36.6 °C) (Oral)   Resp 19   Ht 6' 2\" (1.88 m)   Wt 213 lb 12.8 oz (97 kg)   SpO2 94%   BMI 27.45 kg/m²     Gen: No distress. Alert. Eyes: PERRL. No sclera icterus. No conjunctival injection. ENT: No discharge. Pharynx clear. Neck: Trachea midline. Normal thyroid. Resp: No accessory muscle use. + crackles. No wheezes. No rhonchi. No dullness on percussion. CV: Regular rate. Regular rhythm. No murmur or rub. + edema. Assessment/Plan:  # Acute systolic/diastolic CHF  - patient presented with worsening dyspnea/dyspnea on exertion. - admitted to PCU  - monitor on telemetry. Serial troponin negative  - cardiology consulted  - Mag WNL; TSH 6.15. free T4 pending  - Echo with EF 20-25%, grade II DD.   - NM Stress ordered  - IV Lasix 40 mg BID  - Daily weight/I&O's. # Elevated TSH  - TSH 6.15  - free T4 pending    # Leukocytosis  - likely reactive     # CAD  - on aspirin, Lipitor.       # Tobacco Dependence  - Recommended cessation    DVT Prophylaxis: Lovenox  Diet: Diet NPO Effective Now Exceptions are: Ice Chips, Sips with Meds  Code Status: Full Code    Grace AREVALO-MXA  2/13/2020     JM DOMINGUEZ 2/13/2020 1:37 PM

## 2020-02-13 NOTE — PLAN OF CARE
Patient continues with intermittent episodes of feeling short of breath with activity, but remains on room air. Patient has had adequate urine output with lasix therapy. Pain well controlled with tramadol.

## 2020-02-13 NOTE — H&P
Hospital Medicine History & Physical      PCP: No primary care provider on file. Date of Admission: 2020    Date of Service: Pt seen/examined on 2020 and Admitted to Inpatient with expected LOS greater than two midnights due to medical therapy. Chief Complaint:  SOB x 1 week      History Of Present Illness:     47 y.o. male presents with increased sob, scanlon for a week. Denies chest pain similar to that experienced prior to stent x 2 in 2012. Denies worsening chronic cough he attributes to a continued cigarette habit, fever, chills, n/v, diarrhea. Also denies lower extremity edema, orthopnea. Recently seen in ED with back pain with w/u suggestive of recently passing a kidney stone. Currently, patient is seen and examined on room air, no distress. Past Medical History:          Diagnosis Date    CAD (coronary artery disease)     Heart attack ([address]     Neck pain        Past Surgical History:          Procedure Laterality Date    CARDIAC SURGERY         Medications Prior to Admission:      Prior to Admission medications    Medication Sig Start Date End Date Taking? Authorizing Provider   traMADol (ULTRAM) 50 MG tablet Take 100 mg by mouth every 8 hours as needed for Pain. Yes Historical Provider, MD       Allergies:  Patient has no known allergies. Social History:         TOBACCO:   reports that he has been smoking cigarettes. He has a 28.00 pack-year smoking history. He has never used smokeless tobacco.  ETOH:   reports current alcohol use of about 2.0 standard drinks of alcohol per week. Family History:      (+) premature CAD - Father  of AMI at age 48 y/o    REVIEW OF SYSTEMS:   As above otherwise negative    PHYSICAL EXAM PERFORMED:    /88   Pulse 74   Temp 97.2 °F (36.2 °C) (Oral)   Resp 20   Ht 6' 2\" (1.88 m)   Wt 213 lb 12.8 oz (97 kg)   SpO2 94%   BMI 27.45 kg/m²     General appearance:  No apparent distress, appears stated age and cooperative.   HEENT: Normal cephalic, atraumatic without obvious deformity. Pupils equal, round, and reactive to light. Extra ocular muscles intact. Conjunctivae/corneas clear. Neck: Supple, with full range of motion. No jugular venous distention. Trachea midline. Respiratory:  Normal respiratory effort. Clear to auscultation, bilaterally without Rales/Wheezes/Rhonchi. Cardiovascular:  Regular rate and rhythm with normal S1/S2 without murmurs, rubs or gallops. Abdomen: Soft, non-tender, non-distended with normal bowel sounds. Musculoskeletal:  No clubbing, cyanosis or edema bilaterally. Full range of motion without deformity. Skin: Skin color, texture, turgor normal.  No rashes or lesions. Neurologic:  Neurovascularly intact without any focal sensory/motor deficits. Cranial nerves: II-XII intact, grossly non-focal.  Psychiatric:  Alert and oriented, thought content appropriate, normal insight  Capillary Refill: Brisk,< 3 seconds   Peripheral Pulses: +2 palpable, equal bilaterally       Labs:     Recent Labs     02/10/20  1310 02/12/20  1630   WBC 8.0 11.2*   HGB 16.6 15.7   HCT 49.5 45.8    153     Recent Labs     02/10/20  1310 02/12/20  1630    130*   K 4.0 4.1    94*   CO2 22 24   BUN 14 14   CREATININE 0.8* 0.9   CALCIUM 9.7 9.4     Recent Labs     02/10/20  1310 02/12/20  1630   AST 18 28   ALT 27 44*   BILITOT 0.6 0.9   ALKPHOS 83 80     No results for input(s): INR in the last 72 hours. Recent Labs     02/10/20  1310 02/12/20  1630 02/12/20  2110   [facility] <0.01 <0.01 <0.01       Urinalysis:      Lab Results   Component Value Date    NITRU Negative 02/10/2020    WBCUA 0-2 02/10/2020    BACTERIA Rare 02/10/2020    RBCUA 0-2 02/10/2020    BLOODU TRACE-INTACT 02/10/2020    SPECGRAV >=1.030 02/10/2020    GLUCOSEU Negative 02/10/2020       Radiology:        CT CHEST PULMONARY EMBOLISM W CONTRAST   Final Result   No evidence of pulmonary embolism to the proximal segmental level.       Cardiomegaly with interval development of small bilateral pleural effusions   and pulmonary edema. XR CHEST STANDARD (2 VW)   Final Result   Findings suggest congestive heart failure             ASSESSMENT:    Active Hospital Problems    Diagnosis Date Noted    CHF (congestive heart failure), NYHA class I, acute on chronic, [facility] [I50.43] 02/12/2020         PLAN:    1) CHF  - echocardiogram, tsh, serial trop  - lasix diuresis  - Cardiology consult    2) CAD  - clinically stable, no chest pain  - should be on high intensity statin and asa, will add  - sbp < 140    3) Tobacco abuse  - advised cessation, offered nicotine patch, declined    DVT Prophylaxis: lovenox  Diet: DIET LOW SODIUM 2 GM;  Code Status: Full Code         Polina Morrissey MD    Thank you No primary care provider on file. for the opportunity to be involved in this patient's care. If you have any questions or concerns please feel free to contact me at [phone].

## 2020-02-13 NOTE — CONSULTS
[address] [facility]  [phone]        Reason for Consultation/Chief Complaint: \"I have been having SOB. \"  Consulted for new CHF    History of Present Illness:  Main Doty is a 47 y.o. patient who presented to [facility] 2/12/20 with c/o SOB. Previously followed by Dr. Carleen Cody 5/7/14. He failed to follow up with cardiology afterwards and no meds except tramadol and 1x week baby aspirin. He has PMH small inferior MI in 2012 with OLU to RCA. , afib during PCI cardioverted, HLD, and chronic tobacco abuse. Most recent [facility] 12/13/12 Severe 1V CAD with 99% mid-RCA stenosis with thrombus and DARLING-2 distal flow D2 with diffuse ostial-proximal 30% plaquing; EF 40% with severe inferior HK and anterolateral mild HK. Successful aspiration thrombectomy/stenting of the mid-RCA with a 4.0x 15 mm RESOLUTE OLU. During the procedure he went into atrial fibrillation was was successfully cardioverted to NSR. Most recent ECHO 12/14/12 EF 55%-60% mild concentric hypertrophy. no regional wall motion abnormalities; mild MAC. Now presents with c/o increasing SOB x 1 week. Started with PATTERSON which has worsened and 2 days ago could not lay flat due to SOB. Difficult sleeping and would have to sit upright quickly whenever laying down. In ER Sunday with low back pain radiating to lower abd/groin and told had kidney stone and sent home. Admit EKG showed NSR; LAE; Left axis deviation; LAFB; RBBB; Nonspecific ST abnormality. Note CXR suggest congestive heart failure; BNP 1364, troponin neg x 3; D-gopmo=825. I have been asked to provide consultation regarding further management and testing. Past Medical History:   has a past medical history of CAD (coronary artery disease), Heart attack ([address] and Neck pain. Surgical History:   has a past surgical history that includes Cardiac surgery and cervical fusion.      Social History:   He is , self employed , , lives alone in [place], [place]. 3 grown children. He reports that he quit smoking 2 days ago has smoked 1 ppd since age 12. His smoking use included cigarettes. He has a 28.00 pack-year smoking history. He has never used smokeless tobacco. He reports current alcohol use of about 2.0 standard drinks of alcohol per week. He reports that he does not use drugs. Family History:  family history includes Diabetes in his mother; Heart Disease in his father and mother. Home Medications:  Were reviewed and are listed in nursing record. and/or listed below  Prior to Admission medications    Medication Sig Start Date End Date Taking? Authorizing Provider   traMADol (ULTRAM) 50 MG tablet Take 100 mg by mouth every 8 hours as needed for Pain. Yes Historical Provider, MD        Allergies:  Patient has no known allergies. Review of Systems:   All 14 point review of symptoms completed. Pertinent positives identified in the HPI, all other review of symptoms negative as below.       Physical Examination:    Vitals:    02/13/20 0445   BP: 119/73   Pulse: 74   Resp: 18   Temp: 98.1 °F (36.7 °C)   SpO2: 96%    Weight: 213 lb 12.8 oz (97 kg)         General Appearance:  Alert, cooperative, no distress, appears stated age   Head:  Normocephalic, without obvious abnormality, atraumatic   Eyes:  PERRL, conjunctiva/corneas clear       Nose: Nares normal, no drainage or sinus tenderness   Throat: Lips, mucosa, and tongue normal   Neck: Supple, symmetrical, trachea midline, no adenopathy, thyroid: not enlarged, symmetric, no tenderness/mass/nodules, no carotid bruit or JVD       Lungs:   Clear to auscultation bilaterally, respirations unlabored   Chest Wall:  No tenderness or deformity   Heart:  Regular rate and rhythm, S1, S2 normal, no murmur, rub or gallop   Abdomen:   Soft, non-tender, bowel sounds active all four quadrants,  no masses, no organomegaly           Extremities: Extremities normal, atraumatic, no cyanosis or edema   Pulses: 2+ and symmetric Skin: Skin color, texture, turgor normal, no rashes or lesions   Pysch: Normal mood and affect   Neurologic: Normal gross motor and sensory exam.         Labs  CBC:   Lab Results   Component Value Date    WBC 11.2 2020    RBC 4.86 2020    HGB 15.7 2020    HCT 45.8 2020    MCV 94.3 2020    RDW 12.5 2020     2020     CMP:    Lab Results   Component Value Date     2020    K 4.7 2020    K 4.1 2020    CL 96 2020    CO2 21 2020    BUN 14 2020    CREATININE 0.9 2020    GFRAA >60 2020    AGRATIO 1.5 2020    LABGLOM >60 2020    GLUCOSE 100 2020    PROT 7.4 2020    CALCIUM 9.0 2020    BILITOT 0.9 2020    ALKPHOS 80 2020    AST 28 2020    ALT 44 2020     PT/INR:  No results found for: PTINR  Lab Results   Component Value Date    TROPONINI <0.01 2020     EK20  I have reviewed EKG with the following interpretation:  Impression:  See HPI Sinus rhythm with occasional Premature ventricular complexesPossible Left atrial enlargementLeft axis deviationLeft anterior fascicular blockRight bundle branch blockNonspecific ST abnormalityAbnormal ECGWhen compared with ECG of 10-FEB-2020 13:37,Premature ventricular complexes are now PresentCriteria for Anteroseptal infarct are no longer PresentConfirmed by Miley Hernandez MD, Neli Shearer ([id number]) on 2020 5:05:08 PM    EKG 20  Normal sinus rhythmPossible Left atrial enlargementLeft axis deviationLeft anterior fascicular blockRight bundle branch blockNonspecific ST abnormalityAbnormal ECGWhen compared with ECG of 2020 16:25,Premature ventricular complexes are no longer PresentRBBB and LAFB new since  EKGConfirmed by Neli Andrews MD ([id number]) on 2020 6:54:22 AM    CXR 20  Findings suggest congestive heart failure    FINDINGS: Cardiomegaly. Pulmonary vascular congestion. Pulmonary edema.   No focal pulmonary consolidation. No pleural effusion. CTPA 02/12/20  No evidence of pulmonary embolism to the proximal segmental level. Cardiomegaly with interval development of small bilateral pleural effusions and pulmonary edema. FINDINGS: Pulmonary Arteries: Motion artifact degrades many of the images. Many of the distal segmental and subsegmental pulmonary arteries are obscured, not evaluated, particularly within the lung bases. No filling defects are identified within the central, lobar, or proximal segmental pulmonary arteries to suggest embolism. The central pulmonary arteries are within normal limits in caliber. Mediastinum: The heart is enlarged. Trace pericardial fluid. The thoracic aorta is normal in caliber and course without acute abnormality. Calcified left hilar lymph nodes are present. There are few mildly prominent mediastinal and hilar lymph nodes including a prevascular lymph node which measures 1 point 4 cm in short axis; lymph nodes are likely reactive given below described pulmonary findings. Lungs/pleura: The central airways are patent. There has been development of small bilateral pleural effusions. Fluid is also noted within the right major fissure. There is bilateral diffuse ground-glass opacity, greatest in the bases, associated with bibasilar septal thickening, new since the prior study. Upper Abdomen: Limited images of the upper abdomen show no acute abnormality. Soft Tissues/Bones: No acute bony abnormality. EKG 02/10/20   Normal sinus rhythmPossible Left atrial enlargementRight bundle branch blockleft axisLeft anterior fascicular block; Bifascicular block Anteroseptal infarct , age undeterminedAbnormal ECGNo previous ECGs availableConfirmed by ELICIA SABA MD ([id number]) on 2/10/2020 4:26:09 PM    ECHO 12/14/12  Study Conclusions  - Left ventricle: The cavity size was normal. There was mild    concentric hypertrophy.  Systolic function was normal. The    estimated would have to sit upright quickly whenever laying down. In ER Sunday with low back pain radiating to lower abd/groin and told had kidney stone and sent home. Admit EKG showed NSR; LAE; Left axis deviation; LAFB; RBBB; Nonspecific ST abnormality. Note CXR suggest congestive heart failure; BNP 1364, troponin neg x 3; D-kkinm=916. Diagnosis of SOB and orthopnea/PND with elevated BNP c/w probable CHF of undetermined type. Ruled out for MI and EKG with conduction abnls but no ischemic EKG changes. Recs:  1. Continue IV lasix 20mg BID and adjust accordingly. 2. Continue baby aspirin qd. Continue lipitor 40mg daily. 3. ECHO pending    Further recs pending results of ECHO    Patient Active Problem List   Diagnosis    CHF (congestive heart failure), NYHA class I, acute on chronic[facility]       Thank you for allowing to us to participate in the care or [facility]. Further evaluation will be based upon the patient's clinical course and testing results.

## 2020-02-14 LAB
ANION GAP SERPL CALCULATED.3IONS-SCNC: 14 MMOL/L (ref 3–16)
BUN BLDV-MCNC: 15 MG/DL (ref 7–20)
CALCIUM SERPL-MCNC: 9.6 MG/DL (ref 8.3–10.6)
CHLORIDE BLD-SCNC: 97 MMOL/L (ref 99–110)
CO2: 23 MMOL/L (ref 21–32)
CREAT SERPL-MCNC: 0.8 MG/DL (ref 0.9–1.3)
GFR AFRICAN AMERICAN: >60
GFR NON-AFRICAN AMERICAN: >60
GLUCOSE BLD-MCNC: 139 MG/DL (ref 70–99)
MAGNESIUM: 2.2 MG/DL (ref 1.8–2.4)
POTASSIUM SERPL-SCNC: 3.5 MMOL/L (ref 3.5–5.1)
SODIUM BLD-SCNC: 134 MMOL/L (ref 136–145)

## 2020-02-14 PROCEDURE — 99233 SBSQ HOSP IP/OBS HIGH 50: CPT | Performed by: INTERNAL MEDICINE

## 2020-02-14 PROCEDURE — 2060000000 HC ICU INTERMEDIATE R&B

## 2020-02-14 PROCEDURE — 99232 SBSQ HOSP IP/OBS MODERATE 35: CPT | Performed by: INTERNAL MEDICINE

## 2020-02-14 PROCEDURE — 83735 ASSAY OF MAGNESIUM: CPT

## 2020-02-14 PROCEDURE — 6370000000 HC RX 637 (ALT 250 FOR IP): Performed by: NURSE PRACTITIONER

## 2020-02-14 PROCEDURE — 36415 COLL VENOUS BLD VENIPUNCTURE: CPT

## 2020-02-14 PROCEDURE — 80048 BASIC METABOLIC PNL TOTAL CA: CPT

## 2020-02-14 PROCEDURE — 6370000000 HC RX 637 (ALT 250 FOR IP): Performed by: HOSPITALIST

## 2020-02-14 PROCEDURE — 2580000003 HC RX 258: Performed by: INTERNAL MEDICINE

## 2020-02-14 PROCEDURE — 6370000000 HC RX 637 (ALT 250 FOR IP): Performed by: INTERNAL MEDICINE

## 2020-02-14 PROCEDURE — 6360000002 HC RX W HCPCS: Performed by: INTERNAL MEDICINE

## 2020-02-14 RX ORDER — LISINOPRIL 5 MG/1
5 TABLET ORAL DAILY
Status: DISCONTINUED | OUTPATIENT
Start: 2020-02-14 | End: 2020-02-15 | Stop reason: HOSPADM

## 2020-02-14 RX ORDER — ATORVASTATIN CALCIUM 40 MG/1
40 TABLET, FILM COATED ORAL NIGHTLY
Qty: 30 TABLET | Refills: 3 | Status: SHIPPED | OUTPATIENT
Start: 2020-02-14 | End: 2020-06-23

## 2020-02-14 RX ORDER — FUROSEMIDE 40 MG/1
40 TABLET ORAL DAILY
Status: DISCONTINUED | OUTPATIENT
Start: 2020-02-14 | End: 2020-02-15 | Stop reason: HOSPADM

## 2020-02-14 RX ORDER — CARVEDILOL 3.12 MG/1
3.12 TABLET ORAL 2 TIMES DAILY WITH MEALS
Status: DISCONTINUED | OUTPATIENT
Start: 2020-02-14 | End: 2020-02-15 | Stop reason: HOSPADM

## 2020-02-14 RX ORDER — LISINOPRIL 5 MG/1
5 TABLET ORAL DAILY
Qty: 30 TABLET | Refills: 3 | Status: SHIPPED | OUTPATIENT
Start: 2020-02-15 | End: 2020-06-18 | Stop reason: SDUPTHER

## 2020-02-14 RX ORDER — WARFARIN SODIUM 5 MG/1
5 TABLET ORAL DAILY
Qty: 30 TABLET | Refills: 3 | Status: SHIPPED | OUTPATIENT
Start: 2020-02-14 | End: 2020-02-28 | Stop reason: SDUPTHER

## 2020-02-14 RX ORDER — FUROSEMIDE 40 MG/1
40 TABLET ORAL DAILY
Qty: 60 TABLET | Refills: 3 | Status: SHIPPED | OUTPATIENT
Start: 2020-02-15 | End: 2020-06-18 | Stop reason: SDUPTHER

## 2020-02-14 RX ORDER — ASPIRIN 81 MG/1
81 TABLET, CHEWABLE ORAL DAILY
Qty: 30 TABLET | Refills: 3 | Status: SHIPPED | OUTPATIENT
Start: 2020-02-15

## 2020-02-14 RX ORDER — CARVEDILOL 3.12 MG/1
3.12 TABLET ORAL 2 TIMES DAILY WITH MEALS
Qty: 60 TABLET | Refills: 3 | Status: SHIPPED | OUTPATIENT
Start: 2020-02-14 | End: 2020-06-22 | Stop reason: SDUPTHER

## 2020-02-14 RX ADMIN — TRAMADOL HYDROCHLORIDE 100 MG: 50 TABLET, FILM COATED ORAL at 06:43

## 2020-02-14 RX ADMIN — TRAMADOL HYDROCHLORIDE 100 MG: 50 TABLET, FILM COATED ORAL at 21:19

## 2020-02-14 RX ADMIN — CARVEDILOL 3.12 MG: 3.12 TABLET, FILM COATED ORAL at 19:36

## 2020-02-14 RX ADMIN — FUROSEMIDE 40 MG: 40 TABLET ORAL at 10:21

## 2020-02-14 RX ADMIN — CARVEDILOL 3.12 MG: 3.12 TABLET, FILM COATED ORAL at 10:21

## 2020-02-14 RX ADMIN — ATORVASTATIN CALCIUM 40 MG: 40 TABLET, FILM COATED ORAL at 21:19

## 2020-02-14 RX ADMIN — Medication 10 ML: at 10:22

## 2020-02-14 RX ADMIN — ENOXAPARIN SODIUM 100 MG: 100 INJECTION SUBCUTANEOUS at 21:19

## 2020-02-14 RX ADMIN — ENOXAPARIN SODIUM 100 MG: 100 INJECTION SUBCUTANEOUS at 10:22

## 2020-02-14 RX ADMIN — Medication 10 ML: at 21:20

## 2020-02-14 RX ADMIN — LISINOPRIL 5 MG: 5 TABLET ORAL at 10:21

## 2020-02-14 RX ADMIN — ASPIRIN 81 MG 81 MG: 81 TABLET ORAL at 10:21

## 2020-02-14 ASSESSMENT — PAIN SCALES - GENERAL
PAINLEVEL_OUTOF10: 5
PAINLEVEL_OUTOF10: 8
PAINLEVEL_OUTOF10: 4

## 2020-02-14 NOTE — PROGRESS NOTES
by Bandar Gross MD, [address] [place] [address] ([id number]) on 2/12/2020 5:05:08 PM     EKG 02/12/20  Normal sinus rhythmPossible Left atrial enlargementLeft axis deviationLeft anterior fascicular blockRight bundle branch blockNonspecific ST abnormalityAbnormal ECGWhen compared with ECG of 12-FEB-2020 16:25,Premature ventricular complexes are no longer PresentRBBB and LAFB new since 9/07 EKGConfirmed by Bandar Gross MD, [address] [place] [address] ([id number]) on 2/13/2020 6:54:22 AM     CXR 02/12/20  Findings suggest congestive heart failure    FINDINGS: Cardiomegaly.  Pulmonary vascular congestion.  Pulmonary edema.  No focal pulmonary consolidation.  No pleural effusion.       CTPA 02/12/20  No evidence of pulmonary embolism to the proximal segmental level.  Cardiomegaly with interval development of small bilateral pleural effusions and pulmonary edema. FINDINGS: Pulmonary Arteries: Motion artifact degrades many of the images.  Many of the distal segmental and subsegmental pulmonary arteries are obscured, not evaluated, particularly within the lung bases.  No filling defects are identified within the central, lobar, or proximal segmental pulmonary arteries to suggest embolism.  The central pulmonary arteries are within normal limits in caliber.  Mediastinum: The heart is enlarged.  Trace pericardial fluid.  The thoracic aorta is normal in caliber and course without acute abnormality.  Calcified left hilar lymph nodes are present. Liddie Schley are few mildly prominent mediastinal and hilar lymph nodes including a prevascular lymph node which measures 1 point 4 cm in short axis; lymph nodes are likely reactive given below described pulmonary findings.  Lungs/pleura:  The central airways are patent. Liddie Schley has been development of small bilateral pleural effusions.  Fluid is also noted within the right major fissure.  There is bilateral diffuse ground-glass opacity, greatest in the bases, associated with bibasilar septal thickening, new since the prior study.  Upper Abdomen: Limited images of the upper abdomen show no acute abnormality.  Soft Tissues/Bones: No acute bony abnormality.      EKG 02/10/20   Normal sinus rhythmPossible Left atrial enlargementRight bundle branch blockleft axisLeft anterior fascicular block; Bifascicular block Anteroseptal infarct , age undeterminedAbnormal ECGNo previous ECGs availableConfirmed by ELICIA SABA MD ([id number]) on 2/10/2020 4:26:09 PM     ECHO 12/14/12  Study Conclusions  - Left ventricle: The cavity size was normal. There was mild    concentric hypertrophy. Systolic function was normal. The    estimated ejection fraction was in the range of 55% to    60%. Wall motion was normal; there were no regional wall    motion abnormalities. Left ventricular diastolic function    parameters were normal. Mitral valve: Mildly calcified    annulus.     [facility] 12/13/12  1. Severe 1-vessel CAD with 99% mid-RCA stenosis with thrombus and DARLING-2 distal flow  2. D2 with diffuse ostial-proximal 30% plaquing but not severe  3. Moderately impaired LV systolic function with EF 40% with severe inferior hypokinesis but anterolateral mild hypokinesis  4. Successful aspiration thrombectomy/stenting of the mid-RCA with a 4.0x 15 mm RESOLUTE OLU post-dilated with a 4.5 mm noncompliant balloon to high pressure with 0% residual stenosis and DARLING-3 flow  5. Successful Angioseal device closure of the right femoral arteriotomy  During the procedure he went into atrial fibrillation was was successfully cardioverted into a sinus rhythm    Lexiscan myoview 2/13/20  Summary Severely reduced LVEF 27%  Severe global hypokinesis with regional variation, more prominent  inferoapical hypokinesis is noted. Severely enlarged LVEDV 277c and LVESV 203cc  Fixed inferoapical and lateral defects consistent with scar  No ischemia     ECHO 2/13/20 Summary   LV systolic function is severely reduced with an EF of 20-25 %. There is more prominent HK of the inferolateral and apical segments.    There is a small-medium sized apical thrombus seen with and without use of   Definity contrast.   Left ventricular cavity size is mildly dilated. Grade II diastolic dysfunction with elevated left ventricular filling pressure. The left atrium is severely dilated. The right ventricle is mildly enlarged. Aortic valve appears sclerotic but opens adequately. Mild mitral regurgitation. Systolic pulmonary artery pressure (SPAP) is normal estimated at 16mmHg (Right atrial pressure of 8mmHg).    Assessment:  Ben Fitch is a 47 y.o. patient who presented to [facility] 2/12/20 with c/o SOB. Previously followed by Dr. Ramy Membreno 5/7/14. He failed to follow up with cardiology afterwards and no meds except tramadol and 1x week baby aspirin. He has PMH small inferior MI in 2012 with OLU to RCA. , afib during PCI cardioverted, HLD, and chronic tobacco abuse. Most recent [address] [place] [address] 12/13/12 Severe 1V CAD with 99% mid-RCA stenosis with thrombus and DARLING-2 distal flow D2 with diffuse ostial-proximal 30% plaquing; EF 40% with severe inferior HK and anterolateral mild HK. Successful aspiration thrombectomy/stenting of the mid-RCA with a 4.0x 15 mm RESOLUTE OLU. During the procedure he went into atrial fibrillation was was successfully cardioverted to NSR. Most recent ECHO 12/14/12 EF 55%-60% mild concentric hypertrophy. no regional wall motion abnormalities; mild MAC. Now presents with c/o increasing SOB x 1 week. Started with PATTERSON which has worsened and 2 days ago could not lay flat due to SOB. Difficult sleeping and would have to sit upright quickly whenever laying down. In ER Sunday with low back pain radiating to lower abd/groin and told had kidney stone and sent home. Admit EKG showed NSR; LAE; Left axis deviation; LAFB; RBBB; Nonspecific ST abnormality. Note CXR suggest congestive heart failure; BNP 1364, troponin neg x 3; D-grvmh=790. Diagnosis of SOB and orthopnea/PND with elevated BNP.  Cardiac testing

## 2020-02-14 NOTE — CARE COORDINATION
[facility] was not following pt. [facility]AY spoke with Jamal Coffey with Meds to [facility] and the total cost for out of pocket cost for pt is $166.94. He will call Jamal Coffey to proceed. No further needs from BAY.

## 2020-02-14 NOTE — PROGRESS NOTES
Pt resting in bed watching TV. He denies any pain and SOB at this time but states the SOB is still coming/going. Assessment complete-see flowsheet. Medications given-see MAR. Pt asking questions about diet change for CHF management. This writer spent 15 min discussing heart healthy foods and DASH diet to help with low sodium management. Pt denies further needs as this time, call light and bedside table within reach. Will continue to monitor.

## 2020-02-14 NOTE — FLOWSHEET NOTE
02/14/20 0945   Vital Signs   Temp 97.2 °F (36.2 °C)   Temp Source Oral   Pulse 78   Heart Rate Source Monitor   Resp 18   /67   BP Location Right upper arm   Patient Position High fowlers   Level of Consciousness 0   MEWS Score 1   Oxygen Therapy   SpO2 96 %   O2 Device None (Room air)   Patient resting quietly in bed. No s/s of distress noted. Shift assessment complete, see flow sheet. Denies needs at this time. Call light within reach. Will continue to monitor.

## 2020-02-14 NOTE — PROGRESS NOTES
[facility]   Cardiology  Progress Note     Admit Date: 2020     Reason for follow up: CHF    HPI and Interval History: SOB and orthopnea/PND with elevated BNP. Cardiac testing confirms ischemic cardiomyopathy with acute systolic CHF and likely prior MI in remote past. Also apical thrombus visualized. Patient seen and examined. Clinical notes reviewed. Telemetry reviewed. No new complaint today. No major events overnight. Denies having chest pain, shortness of breath, dyspnea on exertion, Orthopnea, PND at the time of this visit. His symptoms started 9 months ago but just been started on proper meds  Feels better today    Review of System:  All other systems reviewed and are negative except for that noted above. Pertinent negatives are:     · General: negative for fever, chills   · Ophthalmic ROS: negative for - eye pain or loss of vision  · ENT ROS: negative for - headaches, sore throat   · Respiratory: negative for - cough, sputum  · Cardiovascular: Reviewed in HPI  · Gastrointestinal: negative for - abdominal pain, diarrhea, N/V  · Hematology: negative for - bleeding, blood clots, bruising or jaundice  · Genito-Urinary:  negative for - Dysuria or incontinence  · Musculoskeletal: negative for - Joint swelling, muscle pain  · Neurological: negative for - confusion, dizziness, headaches   · Psychiatric: No anxiety, no depression.   · Dermatological: negative for - rash      Physical Examination:  Vitals:    02/15/20 0411   BP: 104/67   Pulse: 64   Resp: 16   Temp: 96.6 °F (35.9 °C)   SpO2: 96%      In: 380 [P.O.:380]  Out: 300    Wt Readings from Last 3 Encounters:   02/15/20 206 lb 8 oz (93.7 kg)   02/10/20 230 lb (104.3 kg)     Temp  Av.2 °F (36.2 °C)  Min: 96.6 °F (35.9 °C)  Max: 97.8 °F (36.6 °C)  Pulse  Av  Min: 62  Max: 78  BP  Min: 104/67  Max: 105/69  SpO2  Av.5 %  Min: 95 %  Max: 96 %    Intake/Output Summary (Last 24 hours) at 2/15/2020 5412  Last data filed at involving native coronary artery of native heart without angina pectoris     Dyspnea on exertion     Elevated brain natriuretic peptide (BNP) level     Acute pulmonary edema (HCC)     Cardiomegaly     CHF (congestive heart failure), NYHA class I, acute on chronic, [facility] 02/12/2020      Active Hospital Problems    Diagnosis Date Noted    Ischemic cardiomyopathy [I25.5]     Acute systolic (congestive) heart failure (HCC) [I50.21]     Coronary artery disease involving native coronary artery of native heart without angina pectoris [I25.10]     Dyspnea on exertion [R06.09]     Elevated brain natriuretic peptide (BNP) level [R79.89]     Acute pulmonary edema (HCC) [J81.0]     Cardiomegaly [I51.7]     CHF (congestive heart failure), NYHA class I, acute on chronic, combined ([facility] [I50.43] 02/12/2020       Assessment:       Plan:     - acute on chronic HFrEF     Continue lisinopril, coreg and lasix   BP is soft , limiting dosing   Compensated now   F/u as outpatient       - cardiomyopathy     EF is 20-25%   Will need EP referral for ICD evaluation or possible Cardiac resynchronization therapy(CRT)         - CAD   On ASA and lipitor   No ischemia    - Apical LV thrombus   On Lovenox   Warfarin was started   F/u in anticoagulation clinic    - Hyponatremia   Limit free water      He can be discharged from cardiology standpoint with anticoagulation and f/u  outpatient    I independently reviewed echo      NOTE: This report was transcribed using voice recognition software. Every effort was made to ensure accuracy, however, inadvertent computerized transcription errors may be present.

## 2020-02-14 NOTE — PROGRESS NOTES
Progress Note    Admit Date:  2/12/2020    47year old male presented with c/o worsening shortness of breath and dyspnea on exertion. Subjective:  Mr. Pradeep Escobar reports his shortness of breath is improved. He still feels somewhat short of breath at rest.  He is stable on RA. His stress shows no ischemia, there are fixed defects. Objective:   Patient Vitals for the past 4 hrs:   BP Temp Temp src Pulse Resp SpO2   02/14/20 0945 105/67 97.2 °F (36.2 °C) Oral 78 18 96 %            Intake/Output Summary (Last 24 hours) at 2/14/2020 1154  Last data filed at 2/14/2020 0917  Gross per 24 hour   Intake 430 ml   Output 550 ml   Net -120 ml       Physical Exam:  Gen: No distress. Alert. Eyes: PERRL. No sclera icterus. No conjunctival injection. ENT: No discharge. Pharynx clear. Neck: Trachea midline. Resp: No accessory muscle use. No crackles. No wheezes. No rhonchi. CV: Regular rate. Regular rhythm. No murmur. No rub. No edema. Capillary Refill: Brisk,< 3 seconds   Peripheral Pulses: +2 palpable, equal bilaterally   GI: Non-tender. Non-distended. Normal bowel sounds. No hernia. Skin: Warm and dry. No nodule on exposed extremities. No rash on exposed extremities. M/S: No cyanosis. No joint deformity. No clubbing. Neuro: Awake. Grossly nonfocal    Psych: Oriented x 3. No anxiety or agitation      Data:  CBC:   Recent Labs     02/12/20  1630   WBC 11.2*   HGB 15.7   HCT 45.8   MCV 94.3        BMP:   Recent Labs     02/12/20  1630 02/13/20  0443 02/14/20  0542   * 132* 134*   K 4.1 4.7 3.5   CL 94* 96* 97*   CO2 24 21 23   BUN 14 14 15   CREATININE 0.9 0.9 0.8*     LIVER PROFILE:   Recent Labs     02/12/20  1630   AST 28   ALT 44*   BILITOT 0.9   ALKPHOS 80     PT/INR: No results for input(s): PROTIME, INR in the last 72 hours.     CULTURES  N/A    RADIOLOGY  NM Cardiac Stress Test Nuclear Imaging   Final Result      CT CHEST PULMONARY EMBOLISM W CONTRAST   Final Result   No evidence of pulmonary embolism to the proximal segmental level. Cardiomegaly with interval development of small bilateral pleural effusions   and pulmonary edema. XR CHEST STANDARD (2 VW)   Final Result   Findings suggest congestive heart failure           NM Stress 2/13/2020   Summary   Severely reduced LVEF 27%   Severe global hypokinesis with regional variation, more prominent   inferoapical hypokinesis is noted.   Severely enlarged LVEDV 277c and LVESV 203cc   Fixed inferoapical and lateral defects consistent with scar   No ischemia         Echo 2/13/2020  Summary   LV systolic function is severely reduced with an EF of 20-25 %. There is more prominent HK of the inferolateral and apical segments. There is a small-medium sized apical thrombus seen with and without use of   Definity contrast.   Left ventricular cavity size is mildly dilated. Grade II diastolic dysfunction with elevated left ventricular filling   pressure. The left atrium is severely dilated. The right ventricle is mildly enlarged. Aortic valve appears sclerotic but opens adequately. Mild mitral regurgitation. Systolic pulmonary artery pressure (SPAP) is normal estimated at 16mmHg   (Right atrial pressure of 8mmHg). Sal Mixon have reviewed the chart on Jaspreet Swenson and personally interviewed and examined patient, reviewed the data (labs and imaging) and after discussion with my PA formulated the plan. Agree with note with the following edits. HPI:     59-year-old white male with a history of CAD who has not seen a physician in the last 8 years. He had a stent placed about 8 years ago and was seeing cardiology. In the last 6 months he has noticed increasing shortness of breath with exertion. Denies any chest pain. He finally came to the ER. Work-up showed acute congestive heart failure. He was started on Lasix. Echo done today showed an EF of 25%. No chest pain.   Eating better since he has been diuresed. 2/14- he is better. Stress test showed no reversible ischemia. Good diuresis. I reviewed the patient's Past Medical History, Past Surgical History, Medications, and Allergies. Physical exam:    /67   Pulse 78   Temp 97.2 °F (36.2 °C) (Oral)   Resp 18   Ht 6' 2\" (1.88 m)   Wt 204 lb 12.8 oz (92.9 kg)   SpO2 96%   BMI 26.29 kg/m²     Gen: No distress. Alert. Eyes: PERRL. No sclera icterus. No conjunctival injection. ENT: No discharge. Pharynx clear. Neck: Trachea midline. Normal thyroid. Resp: No accessory muscle use. few crackles. No wheezes. No rhonchi. No dullness on percussion. CV: Regular rate. Regular rhythm. No murmur or rub. + edema. Assessment/Plan:  # Acute systolic/diastolic CHF  - patient presented with worsening dyspnea/dyspnea on exertion. - admitted to PCU  - monitor on telemetry. Serial troponin negative  - cardiology consulted  - Mag WNL; TSH 6.15. free T4 1.4  - Echo with EF 20-25%, grade II DD and apical thrombus.   - NM Stress with no ischemia, there are fixed defects inferoapical and lateral.   - IV Lasix 40 mg BID--> PO Lasix. - Daily weight/I&O's. - patient on aspirin, start Coreg, Lipitor, Lisinopril, Lasix. # Apical Thrombus  - Lovenox, warfarin bridge. Will need to f/u with coumadin clinic Wednesday. # Elevated TSH  - TSH 6.15  - free T4 1.4    # Leukocytosis  - likely reactive     # CAD  - on aspirin, Lipitor. # Tobacco Dependence  - Recommended cessation    DVT Prophylaxis: Lovenox  Diet: DIET CARDIAC; Low Sodium (2 GM);  Daily Fluid Restriction: 2000 ml  Code Status: Full Code    Grace LARSONP-C  2/14/2020     JM Nieto  2/14/2020 11:54 AM

## 2020-02-15 VITALS
SYSTOLIC BLOOD PRESSURE: 101 MMHG | HEIGHT: 74 IN | HEART RATE: 69 BPM | WEIGHT: 206.5 LBS | TEMPERATURE: 97.8 F | DIASTOLIC BLOOD PRESSURE: 67 MMHG | RESPIRATION RATE: 16 BRPM | BODY MASS INDEX: 26.5 KG/M2 | OXYGEN SATURATION: 97 %

## 2020-02-15 LAB
ANION GAP SERPL CALCULATED.3IONS-SCNC: 14 MMOL/L (ref 3–16)
BUN BLDV-MCNC: 16 MG/DL (ref 7–20)
CALCIUM SERPL-MCNC: 9.5 MG/DL (ref 8.3–10.6)
CHLORIDE BLD-SCNC: 94 MMOL/L (ref 99–110)
CO2: 25 MMOL/L (ref 21–32)
CREAT SERPL-MCNC: 0.9 MG/DL (ref 0.9–1.3)
GFR AFRICAN AMERICAN: >60
GFR NON-AFRICAN AMERICAN: >60
GLUCOSE BLD-MCNC: 99 MG/DL (ref 70–99)
MAGNESIUM: 2 MG/DL (ref 1.8–2.4)
POTASSIUM SERPL-SCNC: 3.7 MMOL/L (ref 3.5–5.1)
PRO-BNP: 445 PG/ML (ref 0–124)
SODIUM BLD-SCNC: 133 MMOL/L (ref 136–145)

## 2020-02-15 PROCEDURE — 83880 ASSAY OF NATRIURETIC PEPTIDE: CPT

## 2020-02-15 PROCEDURE — 2580000003 HC RX 258: Performed by: INTERNAL MEDICINE

## 2020-02-15 PROCEDURE — 83735 ASSAY OF MAGNESIUM: CPT

## 2020-02-15 PROCEDURE — 99238 HOSP IP/OBS DSCHRG MGMT 30/<: CPT | Performed by: INTERNAL MEDICINE

## 2020-02-15 PROCEDURE — 6360000002 HC RX W HCPCS: Performed by: INTERNAL MEDICINE

## 2020-02-15 PROCEDURE — 6370000000 HC RX 637 (ALT 250 FOR IP): Performed by: INTERNAL MEDICINE

## 2020-02-15 PROCEDURE — 80048 BASIC METABOLIC PNL TOTAL CA: CPT

## 2020-02-15 PROCEDURE — 6370000000 HC RX 637 (ALT 250 FOR IP): Performed by: HOSPITALIST

## 2020-02-15 PROCEDURE — 99233 SBSQ HOSP IP/OBS HIGH 50: CPT | Performed by: INTERNAL MEDICINE

## 2020-02-15 PROCEDURE — 36415 COLL VENOUS BLD VENIPUNCTURE: CPT

## 2020-02-15 PROCEDURE — 6370000000 HC RX 637 (ALT 250 FOR IP): Performed by: NURSE PRACTITIONER

## 2020-02-15 RX ORDER — WARFARIN SODIUM 5 MG/1
5 TABLET ORAL DAILY
Status: DISCONTINUED | OUTPATIENT
Start: 2020-02-15 | End: 2020-02-15 | Stop reason: HOSPADM

## 2020-02-15 RX ADMIN — ENOXAPARIN SODIUM 100 MG: 100 INJECTION SUBCUTANEOUS at 09:26

## 2020-02-15 RX ADMIN — FUROSEMIDE 40 MG: 40 TABLET ORAL at 09:26

## 2020-02-15 RX ADMIN — Medication 10 ML: at 09:26

## 2020-02-15 RX ADMIN — CARVEDILOL 3.12 MG: 3.12 TABLET, FILM COATED ORAL at 09:25

## 2020-02-15 RX ADMIN — LISINOPRIL 5 MG: 5 TABLET ORAL at 09:26

## 2020-02-15 RX ADMIN — TRAMADOL HYDROCHLORIDE 100 MG: 50 TABLET, FILM COATED ORAL at 09:25

## 2020-02-15 RX ADMIN — ASPIRIN 81 MG 81 MG: 81 TABLET ORAL at 09:26

## 2020-02-15 ASSESSMENT — PAIN DESCRIPTION - ONSET: ONSET: ON-GOING

## 2020-02-15 ASSESSMENT — PAIN DESCRIPTION - LOCATION: LOCATION: ANKLE

## 2020-02-15 ASSESSMENT — PAIN DESCRIPTION - PAIN TYPE: TYPE: CHRONIC PAIN

## 2020-02-15 ASSESSMENT — PAIN DESCRIPTION - DESCRIPTORS: DESCRIPTORS: ACHING

## 2020-02-15 ASSESSMENT — PAIN - FUNCTIONAL ASSESSMENT: PAIN_FUNCTIONAL_ASSESSMENT: ACTIVITIES ARE NOT PREVENTED

## 2020-02-15 ASSESSMENT — PAIN DESCRIPTION - PROGRESSION: CLINICAL_PROGRESSION: NOT CHANGED

## 2020-02-15 ASSESSMENT — PAIN DESCRIPTION - ORIENTATION: ORIENTATION: RIGHT;LEFT

## 2020-02-15 ASSESSMENT — PAIN SCALES - GENERAL: PAINLEVEL_OUTOF10: 5

## 2020-02-15 ASSESSMENT — PAIN DESCRIPTION - FREQUENCY: FREQUENCY: CONTINUOUS

## 2020-02-15 NOTE — PROGRESS NOTES
All discharge instructions explained, pt denies any questions at this time. IV and tele monitor removed. Pt wishes to ambulate to his car and denies transport.

## 2020-02-15 NOTE — PROGRESS NOTES
Patient's EF (Ejection Fraction) is less than 40%    Patient's weights and intake/output reviewed:    Patient's Last Weight: 204 lbs obtained by standing scale. Difference of 9   lbs less than last documented weight. Intake/Output Summary (Last 24 hours) at 2/14/2020 2240  Last data filed at 2/14/2020 2103  Gross per 24 hour   Intake 560 ml   Output --   Net 560 ml         Pt is currently on room air. Pt denies shortness of breath. Pt without lower extremity edema. Patient and/or Family's stated Goal of Care this Admission: increase activity tolerance, reduce lower extremity edema and unable to assess, will attempt again prior to discharge      Comorbidities Reviewed Yes  Patient has a past medical history of CAD (coronary artery disease), Heart attack ([address] and Neck pain.          >>For CHF and Comorbidity documentation on Education Time and Topics, please see Education Tab

## 2020-02-15 NOTE — PROGRESS NOTES
Pt is lying in bed with their eyes closed. Respirations are easy and even. Call light within reach bed in lowest position with the wheels locked. Will continue to monitor.  Cuco Messina

## 2020-02-15 NOTE — FLOWSHEET NOTE
02/14/20 2103   Vital Signs   Temp 97.2 °F (36.2 °C)   Temp Source Oral   Pulse 62   Resp 16   /69   SpO2 95 %   Intake   P.O. 200 mL   CPOT (Critical Patient)   O2 Device None (Room air)   Pt assessment complete. Pt sitting up in bed. No s/s of distress noted. Lung sounds clear. Pt c/o BLLE pain  Pt rates pain 5/10. PRN tramadol given. Pt also educated on giving subq lovenox injections. Pt able to give his own lovenox shot tonight. Other nightly medications given as well. Denies any further needs at this time. Call light within reach.

## 2020-02-15 NOTE — DISCHARGE INSTR - COC
MANAGEMENT/SOCIAL WORK SECTION    Inpatient Status Date: ***    Readmission Risk Assessment Score:  Readmission Risk              Risk of Unplanned Readmission:        11           Discharging to Facility/ Agency   · Name:   · Address:  · Phone:  · Fax:    Dialysis Facility (if applicable)   · Name:  · Address:  · Dialysis Schedule:  · Phone:  · Fax:    / signature: {Esignature:[id number]}    PHYSICIAN SECTION    Prognosis: Good    Condition at Discharge: Stable    Rehab Potential (if transferring to Rehab): Good    Recommended Labs or Other Treatments After Discharge:     Physician Certification: I certify the above information and transfer of Heriberto Luther  is necessary for the continuing treatment of the diagnosis listed and that he requires Home Care for less 30 days.      Update Admission H&P: No change in H&P    PHYSICIAN SIGNATURE:  Electronically signed by Dianna Arroyo MD on 2/15/20 at 1:56 PM

## 2020-02-17 ENCOUNTER — FOLLOWUP TELEPHONE ENCOUNTER (OUTPATIENT)
Dept: TELEMETRY | Age: 50
End: 2020-02-17

## 2020-02-17 NOTE — DISCHARGE SUMMARY
Name:  Felipe Reyes  Room:  /9966-71  MRN:    [id number]    Discharge Summary      This discharge summary is in conjunction with a complete physical exam done on the day of discharge. Discharging Physician: Dr. Lizz Tabares: 2/12/2020  Discharge:  2/15/2020    HPI taken from admission H&P:    47 y.o. male presents with increased sob, scanlon for a week. Denies chest pain similar to that experienced prior to stent x 2 in 2012. Denies worsening chronic cough he attributes to a continued cigarette habit, fever, chills, n/v, diarrhea. Also denies lower extremity edema, orthopnea. Recently seen in ED with back pain with w/u suggestive of recently passing a kidney stone. Currently, patient is seen and examined on room air, no distress.       Diagnoses this Admission and Hospital Course     # Acute systolic/diastolic CHF  - patient presented with worsening dyspnea/dyspnea on exertion. - monitored on telemetry. Serial troponin negative  - cardiology consulted  - Echo with EF 20-25%, grade II DD and apical thrombus.   - NM Stress with no ischemia, there are fixed defects inferoapical and lateral.   - IV Lasix 40 mg BID--> PO Lasix. - patient on aspirin, start Coreg, Lipitor, Lisinopril, Lasix.      # Apical Thrombus  - Lovenox, warfarin bridge. Will need to f/u with coumadin clinic Wednesday.      # Elevated TSH  - TSH 6.15  - free T4 1.4     # Leukocytosis  - likely reactive      # CAD  - on aspirin, Lipitor. # Tobacco Dependence  - Recommended cessation    Consults    Cardio    Physical Exam at Discharge:    /67   Pulse 69   Temp 97.8 °F (36.6 °C) (Oral)   Resp 16   Ht 6' 2\" (1.88 m)   Wt 206 lb 8 oz (93.7 kg)   SpO2 97%   BMI 26.51 kg/m²     Gen: No distress. Alert. Eyes: PERRL. No sclera icterus. No conjunctival injection. ENT: No discharge. Pharynx clear. Neck: Trachea midline. Resp: No accessory muscle use. No crackles. No wheezes. No rhonchi. CV: Regular rate.  Regular

## 2020-02-17 NOTE — TELEPHONE ENCOUNTER
1st Attempt; No Answer- Left HIPAA compliant voicemail with Non-Urgent Heart Failure Resource Line number for call back.     Miguel Tabares HF BSN-RN  Heart Failure Navigator  [address] [place] [address]  [phone]

## 2020-02-17 NOTE — TELEPHONE ENCOUNTER
2nd Attempt; No Answer- Left HIPAA compliant voicemail with Non-Urgent Heart Failure Resource Line number for call back.     Beryle Overly HF BSN-RN  Heart Failure Navigator  [facility]  [phone]

## 2020-02-17 NOTE — TELEPHONE ENCOUNTER
3rd Attempt; No Answer- Left HIPAA compliant voicemail with Non-Urgent Heart Failure Resource Line number for call back.     Candelario Mathews HF BSN-RN  Heart Failure Navigator  [address] [place] [address]  [phone]

## 2020-02-24 ENCOUNTER — HOSPITAL ENCOUNTER (OUTPATIENT)
Age: 50
Discharge: HOME | End: 2020-02-24
Payer: MEDICAID

## 2020-02-24 ENCOUNTER — OFFICE VISIT (OUTPATIENT)
Dept: INTERNAL MEDICINE CLINIC | Age: 50
End: 2020-02-24

## 2020-02-24 VITALS
DIASTOLIC BLOOD PRESSURE: 74 MMHG | HEIGHT: 74 IN | HEART RATE: 79 BPM | OXYGEN SATURATION: 97 % | WEIGHT: 214 LBS | SYSTOLIC BLOOD PRESSURE: 98 MMHG | BODY MASS INDEX: 27.46 KG/M2

## 2020-02-24 LAB
ANION GAP SERPL CALCULATED.3IONS-SCNC: 16 MMOL/L (ref 3–16)
BUN BLDV-MCNC: 15 MG/DL (ref 7–20)
CALCIUM SERPL-MCNC: 9.9 MG/DL (ref 8.3–10.6)
CHLORIDE BLD-SCNC: 99 MMOL/L (ref 99–110)
CO2: 22 MMOL/L (ref 21–32)
CREAT SERPL-MCNC: 0.8 MG/DL (ref 0.9–1.3)
EXPIRATORY TIME: ABNORMAL
FEF 25-75% %PRED-PRE: ABNORMAL
FEF 25-75% PRED: ABNORMAL
FEF 25-75%-PRE: ABNORMAL
FEV1 %PRED-PRE: 72 %
FEV1 PRED: ABNORMAL
FEV1/FVC %PRED-PRE: ABNORMAL
FEV1/FVC PRED: ABNORMAL
FEV1/FVC: 78 %
FEV1: ABNORMAL
FVC %PRED-PRE: ABNORMAL
FVC PRED: ABNORMAL
FVC: ABNORMAL
GFR AFRICAN AMERICAN: >60
GFR NON-AFRICAN AMERICAN: >60
GLUCOSE BLD-MCNC: 94 MG/DL (ref 70–99)
INR BLD: 1.26 (ref 0.86–1.14)
MAGNESIUM: 2.4 MG/DL (ref 1.8–2.4)
PEF %PRED-PRE: ABNORMAL
PEF PRED: ABNORMAL
PEF-PRE: ABNORMAL
POTASSIUM SERPL-SCNC: 4.6 MMOL/L (ref 3.5–5.1)
PROTHROMBIN TIME: 14.6 SEC (ref 10–13.2)
SODIUM BLD-SCNC: 137 MMOL/L (ref 136–145)

## 2020-02-24 PROCEDURE — 85610 PROTHROMBIN TIME: CPT

## 2020-02-24 PROCEDURE — 80048 BASIC METABOLIC PNL TOTAL CA: CPT

## 2020-02-24 PROCEDURE — 36415 COLL VENOUS BLD VENIPUNCTURE: CPT

## 2020-02-24 PROCEDURE — 99204 OFFICE O/P NEW MOD 45 MIN: CPT | Performed by: INTERNAL MEDICINE

## 2020-02-24 PROCEDURE — 83735 ASSAY OF MAGNESIUM: CPT

## 2020-02-24 ASSESSMENT — PULMONARY FUNCTION TESTS
FEV1_PERCENT_PREDICTED_PRE: 72
FEV1/FVC: 78

## 2020-02-24 NOTE — PROGRESS NOTES
SUBJECTIVE:   New patient. Hospitalized 2/12 - 2/15 with acute systolic / grade II diastolic heart failure. EF 20-25% with apical thrombus. NM Stress with no ischemia, there are fixed defects inferoapical and lateral.  Intravenous diuresis in the hospital and discharged on oral furosemide 40 mg daily. Lovenox and then warfarin for apical thrombus, followed by Coumadin Clinic. Elevated TSH at 6.15 uIU/mL. His last hospitalization was in 2013 with MI and stent [facility] He has been off all medications except for occasional aspirin and fish oil. He has been taking tramadol from pain management but otherwise not medications. Quit smoking when admitted to the hospital after 35 pk/yr history. No known history of COPD. MEDICATIONS:  aspirin 81 MG chewable tablet Take 1 tablet by mouth daily   atorvastatin (LIPITOR) 40 MG tablet Take 1 tablet by mouth nightly   lisinopril (PRINIVIL;ZESTRIL) 5 MG tablet Take 1 tablet by mouth daily   carvedilol (COREG) 3.125 MG tablet Take 1 tablet by mouth 2 times daily (with meals)   furosemide (LASIX) 40 MG tablet Take 1 tablet by mouth daily   warfarin (COUMADIN) 5 MG tablet Take 1 tablet by mouth daily   traMADol (ULTRAM) 50 MG tablet Take 100 mg by mouth every 8 hours as needed for Pain. Lab Results   Component Value Date    WBC 11.2 (H) 02/12/2020    HGB 15.7 02/12/2020     02/12/2020    MCV 94.3 02/12/2020     Lab Results   Component Value Date     (L) 02/15/2020    K 3.7 02/15/2020    CL 94 (L) 02/15/2020    CO2 25 02/15/2020    BUN 16 02/15/2020    CREATININE 0.9 02/15/2020    GLUCOSE 99 02/15/2020     Magnesium (2/15) 2.00 mg/dL  TSH (2/13) 6.15 uIU/mL  Free T4 (2/13) 1.4 ng/dL  Troponin T (2/12) < 0.01 ng/mL    NM Cardiac Stress Test Nuclear Imaging (2/13) Severely reduced LVEF 27%. Severe global hypokinesis with regional variation, more prominent inferoapical hypokinesis is noted. Severely enlarged LVEDV 277c and LVESV 203cc.  Fixed inferoapical and lateral defects consistent with scar.  No ischemia    Contrast CT Chest (2/12) No evidence of pulmonary embolism to the proximal segmental level. Cardiomegaly with interval development of small bilateral pleural effusions and pulmonary edema. Echo (5/05) LV systolic function is severely reduced with an EF of 20-25%. There is more prominent HK of the inferolateral and apical segments. There is a small-medium sized apical thrombus seen with and without use of Definity contrast.  Left ventricular cavity size is mildly dilated. Grade II diastolic dysfunction with elevated left ventricular filling pressure. The left atrium is severely dilated. The right ventricle is mildly enlarged. Aortic valve appears sclerotic but opens adequately. Mild mitral regurgitation. Systolic pulmonary artery pressure (SPAP) is normal estimated at 16mmHg (Right atrial pressure of 8mmHg). PFT (2/24/20): 3.20 (72%) / 3.66 (64%) = 0.87 c/w moderate restriction    OBJECTIVE:    BP 98/74   Pulse 79   Ht 6' 2\" (1.88 m)   Wt 214 lb (97.1 kg)   SpO2 97%   BMI 27.48 kg/m²   HEENT:  Oropharynx clear, no lymphadenopathy,   LUNGS:  Clear and without wheezes  HEART:  Regular rhythm, no appreciable murmur  ABD:  Benign, active bowel sounds  EXT:  No edema, pulses palpable  NEURO:  No focal neurologic deficits noted. ASSESSMENT / PLAN:   1. Ischemic cardiomyopathy with heart failure with reduced ejection fraction or HFrEF (EF 20-25% and grade II diastolic heart failure):  Continue taking lisinopril 5 mg daily, carvedilol 3.125 mg BID, and furosemide (Lasix) 40 mg daily. 2. Electrophysiology referral for ICD evaluation or possible cardiac resynchronization. Repeat echo on medications in 2-3 months. 3. Coronary artery disease: continue taking aspirin 81 mg daily and atorvastatin (Lipitor) 40 mg nightly.    4. History of smoking:  Spirometry (pulmonary function test) today shows moderate restriction or 72% predicted. 5. Elevated TSH:  Not at threshold for thyroid replacement therapy. Recheck in six months.        Follow-up with coumadin clinic at [facility] this week and cardiology on Thursday, February 27   Follow up at [facility] in 2-3 weeks  LABS: INR (warfarin level), basic metabolic panel with potassium and magnesium

## 2020-02-26 NOTE — PROGRESS NOTES
volunteering at local [employer] without difficulties. Home -110/70's  He reports he stopped smoking since hospitalization. Past Medical History:   has a past medical history of CAD (coronary artery disease), Heart attack ([address] and Neck pain. Surgical History:   has a past surgical history that includes cervical fusion and Cardiac surgery. Social History:   reports that he quit smoking about 2 weeks ago. His smoking use included cigarettes. He has a 28.00 pack-year smoking history. He has never used smokeless tobacco. He reports current alcohol use of about 2.0 standard drinks of alcohol per week. He reports that he does not use drugs. Family History:  family history includes Diabetes in his mother; Heart Disease in his father and mother. Home Medications:  Prior to Admission medications    Medication Sig Start Date End Date Taking? Authorizing Provider   aspirin 81 MG chewable tablet Take 1 tablet by mouth daily 2/15/20  Yes ERVIN Rivero CNP   atorvastatin (LIPITOR) 40 MG tablet Take 1 tablet by mouth nightly 2/14/20  Yes ERVIN Rivero CNP   lisinopril (PRINIVIL;ZESTRIL) 5 MG tablet Take 1 tablet by mouth daily 2/15/20  Yes ERVIN Rivero CNP   carvedilol (COREG) 3.125 MG tablet Take 1 tablet by mouth 2 times daily (with meals) 2/14/20  Yes ERVIN Rivero CNP   furosemide (LASIX) 40 MG tablet Take 1 tablet by mouth daily 2/15/20  Yes ERVIN Rivero CNP   warfarin (COUMADIN) 5 MG tablet Take 1 tablet by mouth daily 2/14/20  Yes ERVIN Rivero CNP   traMADol (ULTRAM) 50 MG tablet Take 100 mg by mouth every 8 hours as needed for Pain. Yes Historical Provider, MD        Allergies:  Statins     Review of Systems:   · Constitutional: there has been no unanticipated weight loss. There's been no change in energy level, sleep pattern, or activity level. · Eyes: No visual changes or diplopia. No scleral icterus.   · ENT: No cardiac rehab and be monitored    Cost of prescription medications and patient compliance have been reviewed with patient. All questions answered. Thank you for allowing me to participate in the care of this individual.     This note was scribed in the presence of Sherman Wilkerson MD by Yareli Lombardo RN    I, Dr. Daniel King, personally performed the services described in this documentation, as scribed by the above signed scribe in my presence. It is both accurate and complete to my knowledge. I agree with the details independently gathered by the clinical support staff, while the remaining scribed note accurately describes my personal service to the patient. Sumi Shelton.  Wayne Armendariz M.D., [facility]

## 2020-02-27 ENCOUNTER — HOSPITAL ENCOUNTER (OUTPATIENT)
Age: 50
Discharge: HOME | End: 2020-02-27
Payer: MEDICAID

## 2020-02-27 ENCOUNTER — OFFICE VISIT (OUTPATIENT)
Dept: CARDIOLOGY CLINIC | Age: 50
End: 2020-02-27
Payer: MEDICAID

## 2020-02-27 ENCOUNTER — TELEPHONE (OUTPATIENT)
Dept: CARDIOLOGY CLINIC | Age: 50
End: 2020-02-27

## 2020-02-27 VITALS
WEIGHT: 214 LBS | SYSTOLIC BLOOD PRESSURE: 98 MMHG | OXYGEN SATURATION: 99 % | HEIGHT: 74 IN | HEART RATE: 76 BPM | DIASTOLIC BLOOD PRESSURE: 60 MMHG | BODY MASS INDEX: 27.46 KG/M2

## 2020-02-27 LAB
INR BLD: 1.3 (ref 0.86–1.14)
PROTHROMBIN TIME: 15.1 SEC (ref 10–13.2)

## 2020-02-27 PROCEDURE — 99214 OFFICE O/P EST MOD 30 MIN: CPT | Performed by: INTERNAL MEDICINE

## 2020-02-27 PROCEDURE — 36415 COLL VENOUS BLD VENIPUNCTURE: CPT

## 2020-02-27 PROCEDURE — 85610 PROTHROMBIN TIME: CPT

## 2020-02-27 RX ORDER — SPIRONOLACTONE 25 MG/1
12.5 TABLET ORAL DAILY
Qty: 45 TABLET | Refills: 3 | Status: SHIPPED | OUTPATIENT
Start: 2020-02-27 | End: 2020-06-23 | Stop reason: SDUPTHER

## 2020-02-27 NOTE — TELEPHONE ENCOUNTER
----- Message from Marianne Still MD sent at 2/27/2020  4:29 PM EST -----  What is current dose of coumadin? I will need to increase and please make sure he gets enrolled in [facility] coumadin clinic for next week. Thanks.

## 2020-02-27 NOTE — LETTER
(with meals) 2/14/20  Yes ERVIN Goetz CNP   furosemide (LASIX) 40 MG tablet Take 1 tablet by mouth daily 2/15/20  Yes ERVIN Goetz CNP   warfarin (COUMADIN) 5 MG tablet Take 1 tablet by mouth daily 2/14/20  Yes ERVIN Goetz CNP   traMADol (ULTRAM) 50 MG tablet Take 100 mg by mouth every 8 hours as needed for Pain. Yes Historical Provider, MD        Allergies:  Statins     Review of Systems:   · Constitutional: there has been no unanticipated weight loss. There's been no change in energy level, sleep pattern, or activity level. · Eyes: No visual changes or diplopia. No scleral icterus. · ENT: No Headaches, hearing loss or vertigo. No mouth sores or sore throat. · Cardiovascular: Reviewed in HPI  · Respiratory: No cough or wheezing, no sputum production. No hematemesis. · Gastrointestinal: No abdominal pain, appetite loss, blood in stools. No change in bowel or bladder habits. · Genitourinary: No dysuria, trouble voiding, or hematuria. · Musculoskeletal:  No gait disturbance, weakness or joint complaints. · Integumentary: No rash or pruritis. · Neurological: No headache, diplopia, change in muscle strength, numbness or tingling. No change in gait, balance, coordination, mood, affect, memory, mentation, behavior. · Psychiatric: No anxiety, no depression. · Endocrine: No malaise, fatigue or temperature intolerance. No excessive thirst, fluid intake, or urination. No tremor. · Hematologic/Lymphatic: No abnormal bruising or bleeding, blood clots or swollen lymph nodes. · Allergic/Immunologic: No nasal congestion or hives.     Physical Examination:    Vitals:    02/27/20 1239   BP: 98/60   Pulse: 76   SpO2: 99%        Wt Readings from Last 3 Encounters:   02/27/20 214 lb (97.1 kg)   02/24/20 214 lb (97.1 kg)   02/15/20 206 lb 8 oz (93.7 kg)       Constitutional and General Appearance: NAD   Respiratory:  · Normal excursion and expansion without use of accessory muscles

## 2020-02-27 NOTE — PATIENT INSTRUCTIONS
Plan:  1. Will refer to coumadin clinic and should remain on coumadin for 3 more months PT/INR today    2. Recheck limited echo in 2 months to evaluate apical thrombus prior to visit with DR Sam Vázquez  3. Will add spironolactone 25 mg 1/2 tavb per day  4. Will recheck BMP in 1 week  5. Continue to watch low salt diet and limit fluids  6. recheck lipids mid April   7. Follow up Fernandez Faust CNP 1 month and Dr Sam Vázquez in 2 months  8.   I would like to start cardiac rehab but will wait until his insurance kicks in.  9. Advised to not resume active exercises until he is able to start cardiac rehab and be monitored

## 2020-02-28 RX ORDER — WARFARIN SODIUM 5 MG/1
7.5 TABLET ORAL DAILY
Qty: 45 TABLET | Refills: 1 | Status: SHIPPED | OUTPATIENT
Start: 2020-02-28 | End: 2020-07-30 | Stop reason: ALTCHOICE

## 2020-02-28 NOTE — TELEPHONE ENCOUNTER
Spoke with patient. Went over dosing. I LMOVM for [facility] to call me Monday, I also made sure the patient had their number.  He V/U.

## 2020-02-28 NOTE — TELEPHONE ENCOUNTER
Spoke with patient. Went over instructions again. He has [facility] number. I also left a VM for [facility] to call me Monday to make sure the patient gets in weds.

## 2020-03-04 ENCOUNTER — ANTI-COAG VISIT (OUTPATIENT)
Dept: PHARMACY | Age: 50
End: 2020-03-04
Payer: MEDICAID

## 2020-03-04 LAB — INTERNATIONAL NORMALIZATION RATIO, POC: 1.1

## 2020-03-04 PROCEDURE — 99211 OFF/OP EST MAY X REQ PHY/QHP: CPT | Performed by: PHARMACIST

## 2020-03-04 PROCEDURE — 85610 PROTHROMBIN TIME: CPT | Performed by: PHARMACIST

## 2020-03-11 ENCOUNTER — ANTI-COAG VISIT (OUTPATIENT)
Dept: PHARMACY | Age: 50
End: 2020-03-11
Payer: MEDICAID

## 2020-03-11 LAB — INTERNATIONAL NORMALIZATION RATIO, POC: 1.3

## 2020-03-11 PROCEDURE — 85610 PROTHROMBIN TIME: CPT | Performed by: PHARMACIST

## 2020-03-11 PROCEDURE — 99211 OFF/OP EST MAY X REQ PHY/QHP: CPT | Performed by: PHARMACIST

## 2020-03-18 ENCOUNTER — ANTI-COAG VISIT (OUTPATIENT)
Dept: PHARMACY | Age: 50
End: 2020-03-18
Payer: MEDICAID

## 2020-03-18 LAB — INTERNATIONAL NORMALIZATION RATIO, POC: 1.4

## 2020-03-18 PROCEDURE — 99211 OFF/OP EST MAY X REQ PHY/QHP: CPT | Performed by: PHARMACIST

## 2020-03-18 PROCEDURE — 85610 PROTHROMBIN TIME: CPT | Performed by: PHARMACIST

## 2020-03-20 ENCOUNTER — HOSPITAL ENCOUNTER (OUTPATIENT)
Age: 50
Discharge: HOME | End: 2020-03-20
Payer: MEDICAID

## 2020-03-20 ENCOUNTER — OFFICE VISIT (OUTPATIENT)
Dept: CARDIOLOGY CLINIC | Age: 50
End: 2020-03-20
Payer: MEDICAID

## 2020-03-20 VITALS
SYSTOLIC BLOOD PRESSURE: 110 MMHG | DIASTOLIC BLOOD PRESSURE: 82 MMHG | HEART RATE: 75 BPM | WEIGHT: 215 LBS | TEMPERATURE: 98.9 F | OXYGEN SATURATION: 97 % | BODY MASS INDEX: 27.6 KG/M2

## 2020-03-20 PROBLEM — I50.22 CHRONIC SYSTOLIC (CONGESTIVE) HEART FAILURE (HCC): Status: ACTIVE | Noted: 2020-03-20

## 2020-03-20 LAB
ANION GAP SERPL CALCULATED.3IONS-SCNC: 12 MMOL/L (ref 3–16)
BUN BLDV-MCNC: 17 MG/DL (ref 7–20)
CALCIUM SERPL-MCNC: 9.8 MG/DL (ref 8.3–10.6)
CHLORIDE BLD-SCNC: 99 MMOL/L (ref 99–110)
CHOLESTEROL, FASTING: 120 MG/DL (ref 0–199)
CO2: 25 MMOL/L (ref 21–32)
CREAT SERPL-MCNC: 0.8 MG/DL (ref 0.9–1.3)
GFR AFRICAN AMERICAN: >60
GFR NON-AFRICAN AMERICAN: >60
GLUCOSE BLD-MCNC: 95 MG/DL (ref 70–99)
HDLC SERPL-MCNC: 30 MG/DL (ref 40–60)
LDL CHOLESTEROL CALCULATED: 69 MG/DL
POTASSIUM SERPL-SCNC: 4.6 MMOL/L (ref 3.5–5.1)
SODIUM BLD-SCNC: 136 MMOL/L (ref 136–145)
TRIGLYCERIDE, FASTING: 106 MG/DL (ref 0–150)
VLDLC SERPL CALC-MCNC: 21 MG/DL

## 2020-03-20 PROCEDURE — 36415 COLL VENOUS BLD VENIPUNCTURE: CPT

## 2020-03-20 PROCEDURE — 80048 BASIC METABOLIC PNL TOTAL CA: CPT

## 2020-03-20 PROCEDURE — 99214 OFFICE O/P EST MOD 30 MIN: CPT | Performed by: NURSE PRACTITIONER

## 2020-03-20 PROCEDURE — 80061 LIPID PANEL: CPT

## 2020-03-20 ASSESSMENT — ENCOUNTER SYMPTOMS
COUGH: 0
CHEST TIGHTNESS: 0
SHORTNESS OF BREATH: 1

## 2020-03-20 NOTE — PATIENT INSTRUCTIONS
Plan:   1. BMP today- Lipids fasting in April   2. Recheck limited Echo in jazmine 2 months to evaluate apical thrombus prior to visit with Dr. Nitza Chaidez call [facility]   3. Daily weights, low sodium diet, 2000 ml fluid restriction, if you gain 3 lbs in a day 5 lbs in a week, increased swelling, or increased shortness of breath please call the office. 4. Order placed for cardiac rehab- when they open up again  5. No other changes in medication at this time  6.  Follow up with Dr. Nitza Chaidez as scheduled

## 2020-03-20 NOTE — PROGRESS NOTES
drinks of alcohol per week. He reports that he does not use drugs. Family History: family history includes Diabetes in his mother; Heart Disease in his father and mother. Review of Systems   Review of Systems   Constitutional: Positive for activity change and fatigue (improved ). Respiratory: Positive for shortness of breath. Negative for cough and chest tightness. Cardiovascular: Negative for chest pain, palpitations and leg swelling. Musculoskeletal: Negative for myalgias. Neurological: Negative for dizziness and weakness. Psychiatric/Behavioral: Negative for sleep disturbance. OBJECTIVE:    Vital signs:    /82   Pulse 75   Temp 98.9 °F (37.2 °C)   Wt 215 lb (97.5 kg)   SpO2 97%   BMI 27.60 kg/m²      Physical Exam:  Constitutional:  Comfortable and alert, NAD, appears older stated age  Eyes: PERRL, sclera nonicteric  Neck:  Supple, no masses, no thyroidmegaly, no JVD  Skin:  Warm and dry; no rash or lesions  Heart:  Regular, normal apex, S1 and S2 normal, no M/G/R  Lungs:  Normal respiratory effort; clear; no wheezing/rhonchi/rales  Abdomen: soft, non tender, + bowel sounds  Extremities:  No edema or cyanosis; no clubbing  Neuro: alert and oriented, moves legs and arms equally, normal mood and affect      Data Reviewed:    Echo 2/13/2020:   Summary   LV systolic function is severely reduced with an EF of 20-25 %. There is more prominent HK of the inferolateral and apical segments. There is a small-medium sized apical thrombus seen with and without use of   Definity contrast.   Left ventricular cavity size is mildly dilated. Grade II diastolic dysfunction with elevated left ventricular filling   pressure. The left atrium is severely dilated. The right ventricle is mildly enlarged. Aortic valve appears sclerotic but opens adequately. Mild mitral regurgitation.    Systolic pulmonary artery pressure (SPAP) is normal estimated at 16mmHg   (Right atrial pressure of 8mmHg). Stress Test 2/13/2020:   Severely reduced LVEF 27%  Severe global hypokinesis with regional variation, more prominent  inferoapical hypokinesis is noted. Severely enlarged LVEDV 277c and LVESV 203cc  Fixed inferoapical and lateral defects consistent with scar  No ischemia     CTPA 02/12/20  No evidence of pulmonary embolism to the proximal segmental level. Cardiomegaly with interval development of small bilateral pleural effusions and pulmonary edema. FINDINGS: Pulmonary Arteries: Motion artifact degrades many of the images. Many of the distal segmental and subsegmental pulmonary arteries are obscured, not evaluated, particularly within the lung bases. No filling defects are identified within the central, lobar, or proximal segmental pulmonary arteries to suggest embolism. The central pulmonary arteries are within normal limits in caliber. Mediastinum: The heart is enlarged. Trace pericardial fluid. The thoracic aorta is normal in caliber and course without acute abnormality. Calcified left hilar lymph nodes are present. There are few mildly prominent mediastinal and hilar lymph nodes including a prevascular lymph node which measures 1 point 4 cm in short axis; lymph nodes are likely reactive given below described pulmonary findings. Lungs/pleura: The central airways are patent. There has been development of small bilateral pleural effusions. Fluid is also noted within the right major fissure. There is bilateral diffuse ground-glass opacity, greatest in the bases, associated with bibasilar septal thickening, new since the prior study. Upper Abdomen: Limited images of the upper abdomen show no acute abnormality. Soft Tissues/Bones: No acute bony abnormality. [facility] 12/13/12  1. Severe 1-vessel CAD with 99% mid-RCA stenosis with thrombus and DARLING-2 distal flow  2. D2 with diffuse ostial-proximal 30% plaquing but not severe  3.  Moderately impaired LV systolic function with EF 40% with severe inferior hypokinesis but anterolateral mild hypokinesis  4. Successful aspiration thrombectomy/stenting of the mid-RCA with a 4.0x 15 mm RESOLUTE OLU post-dilated with a 4.5 mm noncompliant balloon to high pressure with 0% residual stenosis and DARLING-3 flow  5. Successful Angioseal device closure of the right femoral arteriotomy  During the procedure he went into atrial fibrillation was was successfully cardioverted into a sinus rhythm      Cardiology Labs Reviewed:   Lab Data:  Most recent lab results below reviewed in office    CBC:   Lab Results   Component Value Date    WBC 11.2 02/12/2020    WBC 8.0 02/10/2020    RBC 4.86 02/12/2020    RBC 5.26 02/10/2020    HGB 15.7 02/12/2020    HGB 16.6 02/10/2020    HCT 45.8 02/12/2020    HCT 49.5 02/10/2020    MCV 94.3 02/12/2020    MCV 93.9 02/10/2020    RDW 12.5 02/12/2020    RDW 12.3 02/10/2020     02/12/2020     02/10/2020     BMP:  Lab Results   Component Value Date     02/24/2020     02/15/2020     02/14/2020    K 4.6 02/24/2020    K 3.7 02/15/2020    K 3.5 02/14/2020    K 4.1 02/12/2020    K 4.0 02/10/2020    CL 99 02/24/2020    CL 94 02/15/2020    CL 97 02/14/2020    CO2 22 02/24/2020    CO2 25 02/15/2020    CO2 23 02/14/2020    BUN 15 02/24/2020    BUN 16 02/15/2020    BUN 15 02/14/2020    CREATININE 0.8 02/24/2020    CREATININE 0.9 02/15/2020    CREATININE 0.8 02/14/2020     BNP:   Lab Results   Component Value Date    PROBNP 445 02/15/2020    PROBNP 1,364 02/12/2020     FASTING LIPID PANEL:No results found for: HDL, LDLDIRECT, LDLCALC, TRIG  LIVER PROFILE:No results for input(s): AST, ALT, ALB in the last 72 hours. Reviewed all labs and imaging today    Assessment:   1. CAD: denies chest pain   2. Systolic CHF: appears compensated   3. Ischemic cardiomyopathy: LVEF 20-25% (2/13/2020) previoulsy EF 55-60% (12/2012)  4. Apical Thrombus: on coumadin follows with coumadin clinic     Plan:   1.  BMP today- Lipids fasting in April

## 2020-03-20 NOTE — LETTER
[id number]  [address] [address]  Phone: [phone]  Fax: [phone]     ERVIN Mendez - CNP     3/27/2020     Shweta Barrera MD   [address] [place] [address]     Patient: Perla Landeros   MR Number: [id number]   YOB: 1970   Date of Visit: 3/20/2020     Dear Dr. Shweta Barrera     Today I saw our mutual patient named above. Below are the relevant portions of my assessment and plan of care. If you have questions, please do not hesitate to call me. I look forward to following Lucrecia Janekayla along with you. [address]   Cardiology Note              Date:  March 20, 2020  Patientname: Perla Landeros  YOB: 1970    Chief Complaint   Patient presents with    Follow-up        Primary Care physician: Shweta Barrera MD    HISTORY OF PRESENT ILLNESS: Perla Landeros is a 47 y.o. male who presented to the hospital on 2/12/2020 with complaints of shortness of breath. He followed with Dr. Sonido Baldwin 5/7/14  cardiology care. No follow up afterwards. He has a history of small inferior MI in 2012 with OLU to RCA, ischemic cardiomyopathy LVEF 20-25%, apical thrombus dx 2/2020, atrial fibrillation during PCI and cardioverted, HLD, and chronic tobacco abuse. Most recent LHC (12/13/2012) showed severe 1V CAD with 99% mid-RCA stenosis with thrombus and DARLING-2- distal flue D2 diffuse ostial-proximal 30% plaque; EF 40%. Successful aspiration thrombectomy/stening to mid-RCA with OLU. He was admitted 2/2020 and diagnoised ischemic cardiomyopathy, acute sCHF, alpical thrombus. Stress test completed with showed severely reduced LVEF 27%. Severe  global hypokinesis with regional variation, more prominent hypokinesis of the inferolateral and apical segments. Small-mediul size apical thrombus seen.   He was started on coumadin while in hospital and follows with the 2/15/20  Yes ERVIN Rivero CNP   carvedilol (COREG) 3.125 MG tablet Take 1 tablet by mouth 2 times daily (with meals) 2/14/20  Yes ERVIN Rivero CNP   furosemide (LASIX) 40 MG tablet Take 1 tablet by mouth daily 2/15/20  Yes ERVIN Rivero CNP   traMADol (ULTRAM) 50 MG tablet Take 100 mg by mouth every 8 hours as needed for Pain. Yes Historical Provider, MD       Allergies:  Statins    Social History:   reports that he quit smoking about 5 weeks ago. His smoking use included cigarettes. He has a 28.00 pack-year smoking history. He has never used smokeless tobacco. He reports current alcohol use of about 2.0 standard drinks of alcohol per week. He reports that he does not use drugs. Family History: family history includes Diabetes in his mother; Heart Disease in his father and mother. Review of Systems   Review of Systems   Constitutional: Positive for activity change and fatigue (improved ). Respiratory: Positive for shortness of breath. Negative for cough and chest tightness. Cardiovascular: Negative for chest pain, palpitations and leg swelling. Musculoskeletal: Negative for myalgias. Neurological: Negative for dizziness and weakness. Psychiatric/Behavioral: Negative for sleep disturbance.        OBJECTIVE:    Vital signs:    /82   Pulse 75   Temp 98.9 °F (37.2 °C)   Wt 215 lb (97.5 kg)   SpO2 97%   BMI 27.60 kg/m²       Physical Exam:  Constitutional:  Comfortable and alert, NAD, appears older stated age  Eyes: PERRL, sclera nonicteric  Neck:  Supple, no masses, no thyroidmegaly, no JVD  Skin:  Warm and dry; no rash or lesions  Heart:  Regular, normal apex, S1 and S2 normal, no M/G/R  Lungs:  Normal respiratory effort; clear; no wheezing/rhonchi/rales  Abdomen: soft, non tender, + bowel sounds  Extremities:  No edema or cyanosis; no clubbing  Neuro: alert and oriented, moves legs and arms equally, normal mood and affect      Data Reviewed: Echo 2/13/2020:   Summary   LV systolic function is severely reduced with an EF of 20-25 %. There is more prominent HK of the inferolateral and apical segments. There is a small-medium sized apical thrombus seen with and without use of   Definity contrast.   Left ventricular cavity size is mildly dilated. Grade II diastolic dysfunction with elevated left ventricular filling   pressure. The left atrium is severely dilated. The right ventricle is mildly enlarged. Aortic valve appears sclerotic but opens adequately. Mild mitral regurgitation. Systolic pulmonary artery pressure (SPAP) is normal estimated at 16mmHg   (Right atrial pressure of 8mmHg). Stress Test 2/13/2020:   Severely reduced LVEF 27%  Severe global hypokinesis with regional variation, more prominent  inferoapical hypokinesis is noted. Severely enlarged LVEDV 277c and LVESV 203cc  Fixed inferoapical and lateral defects consistent with scar  No ischemia     CTPA 02/12/20  No evidence of pulmonary embolism to the proximal segmental level. Cardiomegaly with interval development of small bilateral pleural effusions and pulmonary edema. FINDINGS: Pulmonary Arteries: Motion artifact degrades many of the images. Many of the distal segmental and subsegmental pulmonary arteries are obscured, not evaluated, particularly within the lung bases. No filling defects are identified within the central, lobar, or proximal segmental pulmonary arteries to suggest embolism. The central pulmonary arteries are within normal limits in caliber. Mediastinum: The heart is enlarged. Trace pericardial fluid. The thoracic aorta is normal in caliber and course without acute abnormality. Calcified left hilar lymph nodes are present.   There are few mildly prominent mediastinal and hilar lymph nodes including a prevascular lymph node which measures 1 point 4 cm in short axis; lymph nodes are likely reactive given below described pulmonary findings. Lungs/pleura: The central airways are patent. There has been development of small bilateral pleural effusions. Fluid is also noted within the right major fissure. There is bilateral diffuse ground-glass opacity, greatest in the bases, associated with bibasilar septal thickening, new since the prior study. Upper Abdomen: Limited images of the upper abdomen show no acute abnormality. Soft Tissues/Bones: No acute bony abnormality. [facility] 12/13/12  1. Severe 1-vessel CAD with 99% mid-RCA stenosis with thrombus and DARLING-2 distal flow  2. D2 with diffuse ostial-proximal 30% plaquing but not severe  3. Moderately impaired LV systolic function with EF 40% with severe inferior hypokinesis but anterolateral mild hypokinesis  4. Successful aspiration thrombectomy/stenting of the mid-RCA with a 4.0x 15 mm RESOLUTE OLU post-dilated with a 4.5 mm noncompliant balloon to high pressure with 0% residual stenosis and DARLING-3 flow  5.  Successful Angioseal device closure of the right femoral arteriotomy  During the procedure he went into atrial fibrillation was was successfully cardioverted into a sinus rhythm      Cardiology Labs Reviewed:   Lab Data:  Most recent lab results below reviewed in office    CBC:   Lab Results   Component Value Date    WBC 11.2 02/12/2020    WBC 8.0 02/10/2020    RBC 4.86 02/12/2020    RBC 5.26 02/10/2020    HGB 15.7 02/12/2020    HGB 16.6 02/10/2020    HCT 45.8 02/12/2020    HCT 49.5 02/10/2020    MCV 94.3 02/12/2020    MCV 93.9 02/10/2020    RDW 12.5 02/12/2020    RDW 12.3 02/10/2020     02/12/2020     02/10/2020     BMP:  Lab Results   Component Value Date     02/24/2020     02/15/2020     02/14/2020    K 4.6 02/24/2020    K 3.7 02/15/2020    K 3.5 02/14/2020    K 4.1 02/12/2020    K 4.0 02/10/2020    CL 99 02/24/2020    CL 94 02/15/2020    CL 97 02/14/2020    CO2 22 02/24/2020    CO2 25 02/15/2020    CO2 23 02/14/2020    BUN 15 02/24/2020 BUN 16 02/15/2020    BUN 15 02/14/2020    CREATININE 0.8 02/24/2020    CREATININE 0.9 02/15/2020    CREATININE 0.8 02/14/2020     BNP:   Lab Results   Component Value Date    PROBNP 445 02/15/2020    PROBNP 1,364 02/12/2020     FASTING LIPID PANEL:No results found for: HDL, LDLDIRECT, LDLCALC, TRIG  LIVER PROFILE:No results for input(s): AST, ALT, ALB in the last 72 hours. Reviewed all labs and imaging today    Assessment:   1. CAD: denies chest pain   2. Systolic CHF: appears compensated   3. Ischemic cardiomyopathy: LVEF 20-25% (2/13/2020) previoulsy EF 55-60% (12/2012)  4. Apical Thrombus: on coumadin follows with coumadin clinic     Plan:   1. BMP today- Lipids fasting in April   2. Recheck limited Echo in jazmine 2 months to evaluate apical thrombus prior to visit with Dr. Shahram Hernadez [facility]   3. Daily weights, low sodium diet, 2000 ml fluid restriction, if you gain 3 lbs in a day 5 lbs in a week, increased swelling, or increased shortness of breath please call the office. 4. Order placed for cardiac rehab- when they open up again  5. No other changes in medication at this time  6. Follow up with Dr. Melanie Valdovinos, [address] [address]  [phone]      QUALITY MEASURES  1. Tobacco Cessation Counseling: NA  2. Retake of BP if >140/90:   NA  3. Documentation to PCP/referring for new patient:  Sent to PCP at close of office visit  4. CAD patient on anti-platelet: Yes  5. CAD patient on STATIN therapy:  Yes  6.  Patient with CHF and aFib on anticoagulation:  Yes for apical thrombus not afib     Sincerely,      Kirit Osman, APRN - CNP

## 2020-03-24 ENCOUNTER — OFFICE VISIT (OUTPATIENT)
Dept: INTERNAL MEDICINE CLINIC | Age: 50
End: 2020-03-24

## 2020-03-24 VITALS
DIASTOLIC BLOOD PRESSURE: 76 MMHG | HEIGHT: 74 IN | OXYGEN SATURATION: 98 % | WEIGHT: 213 LBS | BODY MASS INDEX: 27.34 KG/M2 | HEART RATE: 66 BPM | SYSTOLIC BLOOD PRESSURE: 108 MMHG

## 2020-03-24 PROCEDURE — 99214 OFFICE O/P EST MOD 30 MIN: CPT | Performed by: INTERNAL MEDICINE

## 2020-03-24 NOTE — PROGRESS NOTES
Test Nuclear Imaging (2/13) Severely reduced LVEF 27%. Severe global hypokinesis with regional variation, more prominent inferoapical hypokinesis is noted. Severely enlarged LVEDV 277c and LVESV 203cc.  Fixed inferoapical and lateral defects consistent with scar.  No ischemia     Contrast CT Chest (2/12) No evidence of pulmonary embolism to the proximal segmental level. Cardiomegaly with interval development of small bilateral pleural effusions and pulmonary edema.     Echo (5/63) LV systolic function is severely reduced with an EF of 20-25%. There is more prominent HK of the inferolateral and apical segments. There is a small-medium sized apical thrombus seen with and without use of Definity contrast.  Left ventricular cavity size is mildly dilated. Grade II diastolic dysfunction with elevated left ventricular filling pressure. The left atrium is severely dilated. The right ventricle is mildly enlarged. Aortic valve appears sclerotic but opens adequately. Mild mitral regurgitation. Systolic pulmonary artery pressure (SPAP) is normal estimated at 16mmHg (Right atrial pressure of 8mmHg). PFT (2/24/20): 3.20 (72%) / 3.66 (64%) = 0.87 c/w moderate restriction    Lipid Panel 03/20/20  Cholesterol, Fasting 120    Triglyceride, Fasting 106    HDL 30    LDL Calculated 69        OBJECTIVE:    /76   Pulse 66   Ht 6' 2\" (1.88 m)   Wt 213 lb (96.6 kg)   SpO2 98%   BMI 27.35 kg/m²   HEENT:  Oropharynx clear, no lymphadenopathy,   LUNGS:  Clear and without wheezes  HEART:  Regular rhythm, no appreciable murmur  ABD:  Benign, active bowel sounds  EXT:  No edema, pulses palpable  NEURO:  No focal neurologic deficits noted. ASSESSMENT / PLAN:   1. Ischemic cardiomyopathy with heart failure with reduced ejection fraction or HFrEF (EF 20-25% and grade II diastolic heart failure): Continue taking lisinopril 5 mg daily, carvedilol 3.125 mg BID, and furosemide (Lasix) 40 mg daily.   Also continue on

## 2020-03-24 NOTE — PATIENT INSTRUCTIONS
1. Ischemic cardiomyopathy with heart failure with reduced ejection fraction or HFrEF (EF 20-25% and grade II diastolic heart failure): Continue taking lisinopril 5 mg daily, carvedilol 3.125 mg BID, and furosemide (Lasix) 40 mg daily. Also continue on spirinolactone 12.5 (half tablet) mg every evening. Potassium normal on 3/20.    2. Electrophysiology referral for ICD evaluation or possible cardiac resynchronization. Repeat echo on medications before next visit in April. 3. Coronary artery disease: continue taking aspirin 81 mg daily and atorvastatin (Lipitor) 40 mg nightly. LDL (bad cholesterol) is reasonable at 69 on 3/20/20 (goal less than 70). 4. Followed by anticoagulation clinic on warfarin (Coumadin) adjusted to INR 2-3. Last INR was 1.4 on on 3/18/20.    5. History of smoking:  Spirometry (pulmonary function test) today shows moderate restriction or 72% predicted. No need for inhaler at this time. We may recommend tiotropium (Spiriva) in the future. 6. Elevated TSH:  Not at threshold for thyroid replacement therapy. Recheck in six months.        Follow-up as needed or three months

## 2020-04-01 ENCOUNTER — ANTI-COAG VISIT (OUTPATIENT)
Dept: PHARMACY | Age: 50
End: 2020-04-01
Payer: MEDICAID

## 2020-04-01 LAB — INTERNATIONAL NORMALIZATION RATIO, POC: 2.1

## 2020-04-01 PROCEDURE — 99211 OFF/OP EST MAY X REQ PHY/QHP: CPT | Performed by: PHARMACIST

## 2020-04-01 NOTE — PROGRESS NOTES
Mr. Porter Bolivar is here for management of anticoagulation for left ventricular thrombus. PMH also significant for CAD, CHF. He presents today w/out complaint. Pt verifies dosing regimen as listed above. Pt denies s/s bleeding/bruising/swelling/SOB. No BRBPR. No melena. Address missed doses  Reviewed pt medication list  No changes in RX/OTCs/Herbal medications. Reviewed dietary concerns  Address EToH and tobacco use. INR now in range after several dose increases. Will continue current schedule. INR 2.1 is within therapeutic range of 2-3  Recommend to continue dose of 12.5 mg daily. Patient has 5 mg tablets. Will continue to monitor and check INR in 3 weeks. Dosing reminder card given with phone number, appointment date and time.    Return to clinic: 4/22/20 @ 10:00 AM    Juliette London PharmD 10:06 AM EDT 4/1/20

## 2020-04-20 RX ORDER — WARFARIN SODIUM 5 MG/1
12.5 TABLET ORAL DAILY
Qty: 225 TABLET | Refills: 1 | Status: SHIPPED | OUTPATIENT
Start: 2020-04-20 | End: 2020-07-30 | Stop reason: SDUPTHER

## 2020-04-30 ENCOUNTER — OFFICE VISIT (OUTPATIENT)
Dept: CARDIOLOGY CLINIC | Age: 50
End: 2020-04-30
Payer: MEDICAID

## 2020-04-30 VITALS
BODY MASS INDEX: 26.95 KG/M2 | DIASTOLIC BLOOD PRESSURE: 60 MMHG | SYSTOLIC BLOOD PRESSURE: 90 MMHG | OXYGEN SATURATION: 99 % | HEART RATE: 77 BPM | WEIGHT: 210 LBS | HEIGHT: 74 IN

## 2020-04-30 PROCEDURE — G8419 CALC BMI OUT NRM PARAM NOF/U: HCPCS | Performed by: INTERNAL MEDICINE

## 2020-04-30 PROCEDURE — G8427 DOCREV CUR MEDS BY ELIG CLIN: HCPCS | Performed by: INTERNAL MEDICINE

## 2020-04-30 PROCEDURE — 4004F PT TOBACCO SCREEN RCVD TLK: CPT | Performed by: INTERNAL MEDICINE

## 2020-04-30 PROCEDURE — 99214 OFFICE O/P EST MOD 30 MIN: CPT | Performed by: INTERNAL MEDICINE

## 2020-04-30 PROCEDURE — 3017F COLORECTAL CA SCREEN DOC REV: CPT | Performed by: INTERNAL MEDICINE

## 2020-04-30 NOTE — PATIENT INSTRUCTIONS
Plan:  1. Meds reviewed. No refills today per pt request  2. Smoking cessation education and counseling discussed and encouraged. 3. Repeat ECHO as scheduled 6/5/20 @ 10am to reassess EF and apical thrombus   4.  Follow up with me in 3-4 months

## 2020-05-20 ENCOUNTER — ANTI-COAG VISIT (OUTPATIENT)
Dept: PHARMACY | Age: 50
End: 2020-05-20
Payer: MEDICAID

## 2020-05-20 LAB — INTERNATIONAL NORMALIZATION RATIO, POC: 2.7

## 2020-05-20 PROCEDURE — 85610 PROTHROMBIN TIME: CPT | Performed by: PHARMACIST

## 2020-05-20 PROCEDURE — 99211 OFF/OP EST MAY X REQ PHY/QHP: CPT | Performed by: PHARMACIST

## 2020-05-20 NOTE — PROGRESS NOTES
MrPatrick Kessler is here for management of anticoagulation for left ventricular thrombus. PMH also significant for CAD, CHF. He presents today w/out complaint. Pt verifies dosing regimen as listed above. Pt denies s/s bleeding/bruising/swelling/SOB. No BRBPR. No melena. Address missed doses  Reviewed pt medication list  No changes in RX/OTCs/Herbal medications. Reviewed dietary concerns  Address EToH and tobacco use. INR 2.7 is within therapeutic range of 2-3  Recommend to continue dose of 12.5 mg daily. Patient has 5 mg tablets. Will continue to monitor and check INR in 4 weeks. Dosing reminder card given with phone number, appointment date and time.    Return to clinic: 6/17/20 @ 0845 AM    Elizabeth Rodriguez PharmD 1:50 PM EDT 5/20/20

## 2020-06-05 ENCOUNTER — HOSPITAL ENCOUNTER (OUTPATIENT)
Dept: NON INVASIVE DIAGNOSTICS | Age: 50
Discharge: HOME | End: 2020-06-05
Payer: MEDICAID

## 2020-06-05 PROCEDURE — 93308 TTE F-UP OR LMTD: CPT

## 2020-06-05 PROCEDURE — 6360000004 HC RX CONTRAST MEDICATION: Performed by: INTERNAL MEDICINE

## 2020-06-05 RX ADMIN — PERFLUTREN 1.3 ML: 6.52 INJECTION, SUSPENSION INTRAVENOUS at 10:35

## 2020-06-08 ENCOUNTER — TELEPHONE (OUTPATIENT)
Dept: CARDIOLOGY CLINIC | Age: 50
End: 2020-06-08

## 2020-06-09 NOTE — PROGRESS NOTES
memory, mentation, or behavior are noted    DATA:    ECG:    EC2020 SR RBBB 75 bpm   ECHO: 2020   Summary   Limited exam for LVEF and apical thrombus measuring 2x1.2cm. Left ventricular systolic function is severely reduced with a visually   estimated ejection fraction of <20 %. EF estimated by Gu's method at 17% %. The left ventricle is moderately dilated in size. Severe global hypokinesis with regional variation. The right ventricle is not well visualized but appears dilated in size. Right ventricular systolic function is reduced. Severe bi-atrial enlargement. Systolic pulmonary artery pressure (SPAP) is elevated and estimated at 44   mmHg (right atrial pressure 15 mmHg) consistent with mild pulmonary   hypertension. The IVC is dilated in size (>2.1 cm) and collapses <50% with respiration   consistent with elevated right atrial pressures (15 mmHg) . IMPRESSION:    1. Ischemic cardiomyopathy-EF <20%  Patient is a pleasant 41-year-old male with a medical history taken for ischemic cardiomyopathy status post PCI x2, hypertension, hyperlipidemia, and left ventricular thrombus who presents from home for evaluation for possible ICD. Patient has been on goal-directed medical therapy for 90 days since his PCI. He has been compliant with his medications. His left ventricular function continues to be less than 35%. His NYHA class is 2. He has right bundle branch block with a duration of 160 ms. Based on this I think patient be a good candidate for a biventricular ICD. We reviewed risks and benefits. He will consider his options and let me know how he like to proceed. All questions and concerns addressed. 2.  CHF  3. CAD  4. Shortness of breath  5. RBBB    RECOMMENDATIONS:  1. ICD discussed for ventricular tachycardia. Can add third lead to increase heart function. Risks and benefits discussed.   Risk, benefit, alternative, rationale of the procedure were discussed with examination, the discussion of treatments and procedures, and medical decision making performed by me. Ananda Pope MD personally performed the services described in this documentation as scribed by nurse in my presence, and is both accurate and complete.     Electronically signed by Yanely Cruz MD on 6/11/2020 at 10:55 AM

## 2020-06-11 ENCOUNTER — OFFICE VISIT (OUTPATIENT)
Dept: CARDIOLOGY CLINIC | Age: 50
End: 2020-06-11
Payer: MEDICAID

## 2020-06-11 VITALS
SYSTOLIC BLOOD PRESSURE: 90 MMHG | HEIGHT: 74 IN | WEIGHT: 207 LBS | OXYGEN SATURATION: 99 % | HEART RATE: 74 BPM | DIASTOLIC BLOOD PRESSURE: 64 MMHG | BODY MASS INDEX: 26.56 KG/M2

## 2020-06-11 PROCEDURE — 99244 OFF/OP CNSLTJ NEW/EST MOD 40: CPT | Performed by: INTERNAL MEDICINE

## 2020-06-11 PROCEDURE — G8427 DOCREV CUR MEDS BY ELIG CLIN: HCPCS | Performed by: INTERNAL MEDICINE

## 2020-06-11 PROCEDURE — G8419 CALC BMI OUT NRM PARAM NOF/U: HCPCS | Performed by: INTERNAL MEDICINE

## 2020-06-11 NOTE — PATIENT INSTRUCTIONS
Patient Education        Implantable Cardioverter-Defibrillator Placement: What to Expect at Home  Your Recovery    Implantable cardioverter-defibrillator (ICD) placement is surgery to put an ICD in your chest. An ICD is a small, battery-powered device that fixes life-threatening changes in your heartbeat. If the ICD detects a life-threatening rapid heart rhythm, it tries to slow the rhythm back to normal using electrical pulses. If the dangerous rhythm does not stop, the ICD sends an electric shock to the heart to restore a normal rhythm. The device then goes back to its watchful mode. Your chest may be sore where the doctor made the cut (incision) and put in the ICD. You also may have a bruise and mild swelling. These symptoms usually get better in 1 to 2 weeks. You may feel a hard ridge along the incision. This usually gets softer in the months after surgery. You probably will be able to see and feel the outline of the ICD under your skin. You will probably be able to go back to work or your usual routine 1 to 2 weeks after surgery. Your doctor will talk to you about how often you will need to have the ICD checked. You'll need to take steps to safely use electric devices. Some of these devices can stop your ICD from working right for a short time. Check with your doctor about what to avoid and what to keep a short distance away from your ICD. For example, you will need to stay away from things with strong magnetic and electrical fields. An example is an MRI machine (unless your ICD is safe for an MRI). You can use a cell phone and other wireless devices but keep them at least 6 inches away from your ICD. Many household and office electronics do not affect an ICD. These include kitchen appliances and computers. This care sheet gives you a general idea about how long it will take for you to recover. But each person recovers at a different pace. Follow the steps below to get better as quickly as possible.   How electrical pulses. If the dangerous rhythm does not stop, the ICD sends an electric shock to the heart to restore a normal rhythm. The device then goes back to its watchful mode. The doctor puts an ICD in your chest and attaches it to thin wires, called leads. The leads carry the shocks from the ICD to the heart. Before the procedure, you will get medicine to help you relax. The doctor will make an incision (cut) in the skin just below your collarbone or at the side of your chest. The doctor will put the ICD leads through the cut. For one type of ICD, your doctor puts one or two leads (wires) in a large blood vessel and threads them into the heart. For another type, the lead is placed under the skin so that it lies near your heart. Then your doctor connects the leads to the ICD. Your doctor puts the ICD under the skin of your chest and closes the cut with stitches. Your doctor also programs the ICD. The procedure usually takes about an hour. You may stay in the hospital for 1 or 2 days. You can likely return to many of your normal activities after you get an ICD. But to stay safe, you may need to make some changes to your normal routine. You will need to be careful with certain types of electronic equipment. And you'll need to take extra care with medical and dental tests and procedures. You will be given specific instructions after getting your ICD. You may feel anxious or worried about having an ICD. This is common. You might feel better if you use techniques to help you relax. Make a plan for what to do if the ICD shocks you. And think about how the ICD will help you. Talk to your doctor about ways to help ease anxiety. Follow-up care is a key part of your treatment and safety. Be sure to make and go to all appointments, and call your doctor if you are having problems. It's also a good idea to know your test results and keep a list of the medicines you take.   How do you prepare for the

## 2020-06-11 NOTE — LETTER
[facility]   Electrophysiology Consult Note              Date:  June 11, 2020  Patient name: Federica Arnold  YOB: 1970    Reason for Consult:  New Patient; Fatigue; and Shortness of Breath (on exertion)    Requesting Physician:ELICIA Remy MD    HISTORY OF PRESENT ILLNESS: Federica Arnold is a 47 y.o. male who presents for evaluation for ischemic cardiomyopathy. He has a PMH of CAD, inferior MI in 2012 with OLU to RCA, Apical thrombus (2/2020), hx of remote afib during PCI s/p CV, HLD, and ischemic  cardiomyopathy. He is here to discuss ICD placement. Limited echo on 6/5/2020 showed an EF of <20%. Today he states he is doing good. States that he gets winded sometimes with exertion. 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  He denies feet swelling. He states that he has lost 23 lbs. He has been watching diet and salt intake. He works from himself as  and . He denies [employer]. EKG SR RBBB 75 bpm.  He wants to start cardiac rehab once they open back up. Past Medical History:   has a past medical history of CAD (coronary artery disease), Heart attack ([address] and Neck pain. Past Surgical History:   has a past surgical history that includes cervical fusion and Cardiac surgery. Allergies:  Statins    Social History:   reports that he has been smoking cigarettes. He has a 28.00 pack-year smoking history. He has never used smokeless tobacco. He reports current alcohol use of about 2.0 standard drinks of alcohol per week. He reports that he does not use drugs. Family History: family history includes Diabetes in his mother; Heart Disease in his father and mother. Home Medications:    Prior to Admission medications    Medication Sig Start Date End Date Taking?  Authorizing Provider   warfarin (COUMADIN) 5 MG tablet Take 2.5 tablets by mouth daily 4/20/20  Yes Prosper Rodriguez MD personally reviewed by me in its entirety. I confirm that the note above accurately reflects all work, physical examination, the discussion of treatments and procedures, and medical decision making performed by me. Jonah Sanchez MD personally performed the services described in this documentation as scribed by nurse in my presence, and is both accurate and complete.     Electronically signed by Megha Palomino MD on 6/11/2020 at 10:55 AM

## 2020-06-12 PROCEDURE — 93000 ELECTROCARDIOGRAM COMPLETE: CPT | Performed by: INTERNAL MEDICINE

## 2020-06-17 ENCOUNTER — ANTI-COAG VISIT (OUTPATIENT)
Dept: PHARMACY | Age: 50
End: 2020-06-17
Payer: MEDICAID

## 2020-06-17 LAB — INTERNATIONAL NORMALIZATION RATIO, POC: 5

## 2020-06-17 PROCEDURE — 99211 OFF/OP EST MAY X REQ PHY/QHP: CPT | Performed by: PHARMACIST

## 2020-06-17 PROCEDURE — 85610 PROTHROMBIN TIME: CPT | Performed by: PHARMACIST

## 2020-06-18 RX ORDER — LISINOPRIL 5 MG/1
5 TABLET ORAL DAILY
Qty: 30 TABLET | Refills: 5 | Status: SHIPPED | OUTPATIENT
Start: 2020-06-18 | End: 2021-01-04

## 2020-06-18 RX ORDER — FUROSEMIDE 40 MG/1
40 TABLET ORAL DAILY
Qty: 60 TABLET | Refills: 5 | Status: SHIPPED | OUTPATIENT
Start: 2020-06-18 | End: 2021-06-01

## 2020-06-22 ENCOUNTER — TELEPHONE (OUTPATIENT)
Dept: CARDIOLOGY CLINIC | Age: 50
End: 2020-06-22

## 2020-06-22 RX ORDER — CARVEDILOL 3.12 MG/1
3.12 TABLET ORAL 2 TIMES DAILY WITH MEALS
Qty: 60 TABLET | Refills: 3 | Status: SHIPPED | OUTPATIENT
Start: 2020-06-22 | End: 2020-07-30 | Stop reason: SDUPTHER

## 2020-06-22 NOTE — TELEPHONE ENCOUNTER
Medication Refill    Medication needing refilled:carvedilol (COREG)    Doseage of the medication:3.125 mg     How are you taking this medication (QD, BID, TID, QID, PRN): BID    30 or 90 day supply called in: 80    Which Pharmacy are we sending the medication to?:[facility] [address]  [facility] [phone] - F [phone]

## 2020-06-23 ENCOUNTER — OFFICE VISIT (OUTPATIENT)
Dept: INTERNAL MEDICINE CLINIC | Age: 50
End: 2020-06-23

## 2020-06-23 VITALS
HEIGHT: 74 IN | OXYGEN SATURATION: 97 % | TEMPERATURE: 97.8 F | BODY MASS INDEX: 26.95 KG/M2 | DIASTOLIC BLOOD PRESSURE: 78 MMHG | WEIGHT: 210 LBS | HEART RATE: 71 BPM | SYSTOLIC BLOOD PRESSURE: 110 MMHG

## 2020-06-23 PROCEDURE — 99214 OFFICE O/P EST MOD 30 MIN: CPT | Performed by: INTERNAL MEDICINE

## 2020-06-23 RX ORDER — SPIRONOLACTONE 25 MG/1
12.5 TABLET ORAL DAILY
Qty: 45 TABLET | Refills: 5 | Status: SHIPPED | OUTPATIENT
Start: 2020-06-23 | End: 2020-11-09 | Stop reason: SDUPTHER

## 2020-06-23 RX ORDER — ATORVASTATIN CALCIUM 40 MG/1
40 TABLET, FILM COATED ORAL NIGHTLY
Qty: 30 TABLET | Refills: 5 | Status: SHIPPED | OUTPATIENT
Start: 2020-06-23 | End: 2022-01-27 | Stop reason: SDUPTHER

## 2020-06-23 NOTE — PROGRESS NOTES
restriction     Lipid Panel 03/20/20  Cholesterol, Fasting 120    Triglyceride, Fasting 106    HDL 30    LDL Calculated 69       OBJECTIVE:    /78   Pulse 71   Temp 97.8 °F (36.6 °C)   Ht 6' 2\" (1.88 m)   Wt 210 lb (95.3 kg)   SpO2 97%   BMI 26.96 kg/m²   HEENT:  Oropharynx clear, no lymphadenopathy,   LUNGS:  Clear and without wheezes  HEART:  Regular rhythm, no appreciable murmur  ABD:  Benign, active bowel sounds  EXT:  No edema, pulses palpable  NEURO:  No focal neurologic deficits noted. ASSESSMENT / PLAN:   1. Ischemic cardiomyopathy with heart failure with reduced ejection fraction or HFrEF (EF 20-25% and grade II diastolic heart failure) in February. Repeat echocardiogram (June 5) showed and ejection fraction of 17%. Continue taking lisinopril 5 mg daily, carvedilol 3.125 mg BID, and furosemide (Lasix) 40 mg daily. Also continue on spirinolactone 12.5 (half tablet) mg every evening. 2. Electrophysiology referral for ICD evaluation or possible cardiac resynchronization.  Repeat echo on medications shows a lower ejection fraction (below 35%) and definitely indicates that an implantable defibrillator would be indicated. 3. Coronary artery disease: continue taking aspirin 81 mg daily and atorvastatin (Lipitor) 40 mg nightly. LDL (bad cholesterol) is reasonable at 69 on 3/20/20 (goal less than 70). 4. Followed by anticoagulation clinic on warfarin (Coumadin) adjusted to INR 2-3. Last INR was high as 6.0 on on June 17.    5. History of smoking:  Spirometry (pulmonary function test) showed moderate restriction or 72% predicted.  Start using an inhaler, tiotropium (Spiriva) ONE puff every morning. 6. Elevated TSH:  Minimal elevation. Not at threshold for thyroid replacement therapy.  Recheck in the fall after the ICD placement.         Follow-up as needed or two months, or prior to procedure

## 2020-06-23 NOTE — PATIENT INSTRUCTIONS
1. Ischemic cardiomyopathy with heart failure with reduced ejection fraction or HFrEF (EF 20-25% and grade II diastolic heart failure) in February. Repeat echocardiogram (June 5) showed and ejection fraction of 17%. Continue taking lisinopril 5 mg daily, carvedilol 3.125 mg BID, and furosemide (Lasix) 40 mg daily. Also continue on spirinolactone 12.5 (half tablet) mg every evening. 2. Electrophysiology referral for ICD evaluation or possible cardiac resynchronization.  Repeat echo on medications shows a lower ejection fraction (below 35%) and definitely indicates that an implantable defibrillator would be indicated. 3. Coronary artery disease: continue taking aspirin 81 mg daily and atorvastatin (Lipitor) 40 mg nightly. LDL (bad cholesterol) is reasonable at 69 on 3/20/20 (goal less than 70). 4. Followed by anticoagulation clinic on warfarin (Coumadin) adjusted to INR 2-3. Last INR was high as 6.0 on on June 17.    5. History of smoking:  Spirometry (pulmonary function test) showed moderate restriction or 72% predicted.  Start using an inhaler, tiotropium (Spiriva) ONE puff every morning. 6. Elevated TSH:  Minimal elevation. Not at threshold for thyroid replacement therapy.  Recheck in the fall after the ICD placement.         Follow-up as needed or two months, or prior to procedure

## 2020-06-29 ENCOUNTER — TELEPHONE (OUTPATIENT)
Dept: CARDIOLOGY CLINIC | Age: 50
End: 2020-06-29

## 2020-06-29 NOTE — TELEPHONE ENCOUNTER
Pt sts he is ready to get ICD for ventricular tachycardia done. This was discussed at last office visit.  TY

## 2020-07-29 NOTE — PROGRESS NOTES
[address]   Cardiac Followup    Referring Provider:  Geoff Lowry MD     Chief Complaint   Patient presents with    6 Month Follow-Up     SOB past 2 weeks    Shortness of Breath      Subjective: Patient is being seen today for cardiology follow up ischemic CM, CAD, hx apical thrombus; c/o occasional SOB today    Past Medical History:  Jeannette iglesias 47 y. o. patient admitted [facility] 2/12/20 with c/o SOB. Prior saw Dr. Kana Alonzo 5/7/14. No cardiology f/u or meds afterwards. He has PMH small inferior MI in 2012 with OLU to RCA, ischemic CM EF=20-25%, apical thrombus dx 2/20 on warfarin, hx remote afib during PCI s/p CV, HLD, and chronic tobacco abuse.  [facility] [address] 12/13/12 Severe 1V CAD with 99% mid-RCA stenosis with thrombus and DARLING-2 distal flow D2 with diffuse ostial-proximal 30% plaquing; EF 40%; s/p thrombectomy/stenting of the mid-RCA with a 4.0x 15 mm RESOLUTE OLU.   During the procedure he went into afib and cardioverted to NSR.       Admitted mid-February 2020 with diagnosis of ischemic CM with acute systolic CHF. Also apical thrombus visualized. Most recent Lexiscan myoview stress test 02/13/20 showed severely reduced LVEF 27%; more prominent  inferoapical HK noted; Severely enlarged LV. Fixed inferoapical and lateral defects c/w scar showed no ischemia. No LHC recommended with only medical management. Most recent ECHO 02/13/20 EF of 20-25% (EF=55-60% 12/12 study); more prominent HK of the inferolateral and apical segments. small-medium sized apical thrombus seen  Grade II DD; severe LAE; AV sclerosis; Mild MR. Note EKG 2/12/20 showed NSR; left axis; LAFB; RBBB. Started on CHF med regimen plus coumadin for [facility] of apical thrombus. Most recent limited ECHO 6/5/20 EF <20%. Apical . Severe global HK; Severe PATRICIA; (SPAP) is elevated and estimated at 44mmHg c/w mild pulm HTN. Most recent EKG 6/11/20 SR; LAFB; RBBB. He follows [facility] cou[facility]in for PT/INR check.     Today he reports intermittent SOB with exertion but overall does OK in daily living. Denies chest pain, edema, dizziness, palpitations and syncope. I referred to Dr Sonido Steele for ICD consideration and the office called him today left  to schedule ICD. He continues to smoke 1ppd without much interest in quitting. Past Medical History:   has a past medical history of CAD (coronary artery disease), Heart attack ([address] and Neck pain. Surgical History:   has a past surgical history that includes cervical fusion and Cardiac surgery. Social History:   reports that he has been smoking cigarettes. He has a 28.00 pack-year smoking history. He has never used smokeless tobacco. He reports current alcohol use of about 2.0 standard drinks of alcohol per week. He reports that he does not use drugs. Family History:  family history includes Diabetes in his mother; Heart Disease in his father and mother. Home Medications:  Prior to Admission medications    Medication Sig Start Date End Date Taking? Authorizing Provider   spironolactone (ALDACTONE) 25 MG tablet Take 0.5 tablets by mouth daily 6/23/20  Yes Kwadwo Mora MD   atorvastatin (LIPITOR) 40 MG tablet Take 1 tablet by mouth nightly 6/23/20  Yes Kwadwo Mora MD   carvedilol (COREG) 3.125 MG tablet Take 1 tablet by mouth 2 times daily (with meals) 6/22/20  Yes Melva Wright MD   lisinopril (PRINIVIL;ZESTRIL) 5 MG tablet Take 1 tablet by mouth daily 6/18/20  Yes Melva Wright MD   furosemide (LASIX) 40 MG tablet Take 1 tablet by mouth daily 6/18/20  Yes Melva Wright MD   warfarin (COUMADIN) 5 MG tablet Take 1.5 tablets by mouth daily  Patient taking differently: Take 7.5 mg by mouth  2/28/20  Yes Melva Wright MD   aspirin 81 MG chewable tablet Take 1 tablet by mouth daily 2/15/20  Yes ERVIN Delatorre CNP   traMADol (ULTRAM) 50 MG tablet Take 100 mg by mouth every 8 hours as needed for Pain.     Yes Historical Provider, MD is no clubbing, cyanosis of the extremities. · No edema  · Femoral Arteries: 2+ and equal  · Pedal Pulses: 2+ and equal   Abdomen:  · No masses or tenderness  · Liver/Spleen: No Abnormalities Noted  Neurological/Psychiatric:  · Alert and oriented in all spheres  · Moves all extremities well  · Exhibits normal gait balance and coordination  · No abnormalities of mood, affect, memory, mentation, or behavior are noted        Skin: warm and dry    Total cholesterol:  120  EU=578    Lab Results   Component Value Date    HDL 30 (L) 03/20/2020     Lab Results   Component Value Date    LDLCALC 69 03/20/2020     Lab Results   Component Value Date    LABVLDL 21 03/20/2020      lipids 3/20/13   HDL 22 LDL 28    Assessment:     1. Coronary artery disease involving native coronary artery of native heart without angina pectoris:  [facility] [address] 12/13/12 Severe 1V CAD with 99% mid-RCA stenosis with thrombus and DARLING-2 distal flow D2 with diffuse ostial-proximal 30% plaquing; EF 40%;  Successful aspiration thrombectomy/stenting of the mid-RCA s/p OLU. 2. Acute systolic (congestive) heart failure ([facility] [address]): Clinically compensated NYHA Class II and will continue current CHF medical regimen. His SBP > 100mmHg and will try to titrate coreg now. 3. Ischemic cardiomyopathy: Most recent ECHO 02/13/20 EF of 20-25% (EF=55-60% 12/12 study); more prominent HK inferolateral and apical segments. small-medium sized apical thrombus. Most recent limited ECHO 6/5/20 EF <20%; no evidence for thrombus. Unfortunately, LVEF not improved. Will titrate coreg and continue CHF regimen of lasix, aldactone, and lisinopril. 4. Warfarin anticoagulation: Due to apical thrombus. See #3 above. Enrolled in [place] cou[facility] clinic. NO thrombus seen on 6/5/20 ECHO. Will d/c warfarin and continue baby aspirin only for hx CAD and MI prior.        5.      Most recent lipids 3/20/20 I personally reviewed lab results in epic (see above) and

## 2020-07-30 ENCOUNTER — TELEPHONE (OUTPATIENT)
Dept: CARDIOLOGY CLINIC | Age: 50
End: 2020-07-30

## 2020-07-30 ENCOUNTER — OFFICE VISIT (OUTPATIENT)
Dept: CARDIOLOGY CLINIC | Age: 50
End: 2020-07-30
Payer: MEDICAID

## 2020-07-30 VITALS
DIASTOLIC BLOOD PRESSURE: 68 MMHG | WEIGHT: 209.8 LBS | BODY MASS INDEX: 26.92 KG/M2 | OXYGEN SATURATION: 98 % | SYSTOLIC BLOOD PRESSURE: 108 MMHG | HEIGHT: 74 IN | TEMPERATURE: 97.3 F | HEART RATE: 75 BPM

## 2020-07-30 PROCEDURE — 99214 OFFICE O/P EST MOD 30 MIN: CPT | Performed by: INTERNAL MEDICINE

## 2020-07-30 PROCEDURE — 3017F COLORECTAL CA SCREEN DOC REV: CPT | Performed by: INTERNAL MEDICINE

## 2020-07-30 PROCEDURE — G8428 CUR MEDS NOT DOCUMENT: HCPCS | Performed by: INTERNAL MEDICINE

## 2020-07-30 PROCEDURE — 4004F PT TOBACCO SCREEN RCVD TLK: CPT | Performed by: INTERNAL MEDICINE

## 2020-07-30 PROCEDURE — G8419 CALC BMI OUT NRM PARAM NOF/U: HCPCS | Performed by: INTERNAL MEDICINE

## 2020-07-30 RX ORDER — CARVEDILOL 6.25 MG/1
6.25 TABLET ORAL 2 TIMES DAILY WITH MEALS
Qty: 180 TABLET | Refills: 3 | Status: SHIPPED | OUTPATIENT
Start: 2020-07-30 | End: 2021-08-16

## 2020-08-10 ENCOUNTER — HOSPITAL ENCOUNTER (OUTPATIENT)
Dept: CARDIAC REHAB | Age: 50
Setting detail: THERAPIES SERIES
Discharge: HOME | End: 2020-08-10
Payer: MEDICAID

## 2020-08-25 ENCOUNTER — OFFICE VISIT (OUTPATIENT)
Dept: INTERNAL MEDICINE CLINIC | Age: 50
End: 2020-08-25

## 2020-08-25 VITALS
DIASTOLIC BLOOD PRESSURE: 83 MMHG | HEART RATE: 72 BPM | HEIGHT: 74 IN | BODY MASS INDEX: 26.95 KG/M2 | OXYGEN SATURATION: 98 % | TEMPERATURE: 97 F | WEIGHT: 210 LBS | SYSTOLIC BLOOD PRESSURE: 122 MMHG

## 2020-08-25 PROCEDURE — 99214 OFFICE O/P EST MOD 30 MIN: CPT | Performed by: INTERNAL MEDICINE

## 2020-08-25 RX ORDER — TIOTROPIUM BROMIDE 18 UG/1
18 CAPSULE ORAL; RESPIRATORY (INHALATION) DAILY
Qty: 30 CAPSULE | Refills: 5 | Status: SHIPPED | OUTPATIENT
Start: 2020-08-25 | End: 2022-04-09

## 2020-08-25 NOTE — PROGRESS NOTES
Severely enlarged LVEDV 277c and LVESV 203cc.  Fixed inferoapical and lateral defects consistent with scar.  No ischemia.     Contrast CT Chest (2/12) No evidence of pulmonary embolism to the proximal segmental level.  Cardiomegaly with interval development of small bilateral pleural effusions and pulmonary edema.     HNRL (3/06) LV systolic function is severely reduced with an EF of 20-25%.  There is more prominent HK of the inferolateral and apical segments.  There is a small-medium sized apical thrombus seen with and without use of Definity contrast.  Left ventricular cavity size is mildly dilated. Grade II diastolic dysfunction with elevated left ventricular filling pressure. The left atrium is severely dilated. The right ventricle is mildly enlarged.  Aortic valve appears sclerotic but opens adequately.  Mild mitral regurgitation.  Systolic pulmonary artery pressure (SPAP) is normal estimated at 16mmHg (Right atrial pressure of 8mmHg).    Echo (6/5) Limited exam for LVEF and apical thrombus measuring 2x1.2cm. Left ventricular systolic function is severely reduced with a visually estimated ejection fraction of <20 %. EF estimated by Gu's method at 17%. The left ventricle is moderately dilated in size. Severe global hypokinesis with regional variation. The right ventricle is not well visualized but appears dilated in size. Right ventricular systolic function is reduced. Severe bi-atrial enlargement. Systolic pulmonary artery pressure (SPAP) is elevated and estimated at 44 mmHg (right atrial pressure 15 mmHg) consistent with mild pulmonary hypertension.  The IVC is dilated in size (>2.1 cm) and collapses <50% with respiration consistent with elevated right atrial pressures (15 mmHg) .       PFT (2/24/20): 3.20 (72%) / 3.66 (64%) = 0.87 c/w moderate restriction     Lipid Panel 03/20/20  Cholesterol, Fasting 120    Triglyceride, Fasting 106    HDL 30    LDL Calculated 69      INR (6/17) 5.0 uIU/mL (recheck pending)    OBJECTIVE:    /83   Pulse 72   Temp 97 °F (36.1 °C)   Ht 6' 2\" (1.88 m)   Wt 210 lb (95.3 kg)   SpO2 98%   BMI 26.96 kg/m²   HEENT:  Oropharynx clear, no lymphadenopathy,   LUNGS:  Clear and without wheezes  HEART:  Regular rhythm, no appreciable murmur  ABD:  Benign, active bowel sounds  EXT:  No edema, pulses palpable  NEURO:  No focal neurologic deficits noted. ASSESSMENT / PLAN:   1. Ischemic cardiomyopathy with heart failure with reduced ejection fraction or HFrEF (EF 20-25% and grade II diastolic heart failure) in February. Repeat echocardiogram (June 5) showed and ejection fraction of 17% with resolution of the left apical thrombus. Continue taking lisinopril 5 mg daily, carvedilol 6.25 mg BID, and furosemide (Lasix) 40 mg daily.  Also continue on spirinolactone 12.5 (half tablet) mg every evening. No further warfarin (Coumadin) per cardiology. 2. Electrophysiology referral for ICD evaluation or possible cardiac resynchronization.  Repeat echo on medications shows a lower ejection fraction (below 35%) and definitely indicates that an implantable defibrillator would be indicated. ICD placement pending. 3. Coronary artery disease: continue taking aspirin 81 mg daily and atorvastatin (Lipitor) 40 mg nightly.  LDL (bad cholesterol) is reasonable at 69 on 3/20/20 (goal less than 70).    4. History of smoking:  Spirometry (pulmonary function test) showed moderate restriction or 72% predicted.  START using an inhaler, tiotropium (Spiriva) ONE puff every morning. This should help with cardiac rehab. Prescription resent to [facility]. 5. Elevated TSH:  Minimal elevation. Not at threshold for thyroid replacement therapy.  Recheck in the fall after the ICD placement. Follow-up in two months AFTER ICD placement  Be sure to call Dr. Joleen Scherer office to schedule ICD placement.     Labs to include potassium level on furosemide and recheck INR (warfarin) to make sure that has normalized off warfarin.

## 2020-08-25 NOTE — PATIENT INSTRUCTIONS
1. Ischemic cardiomyopathy with heart failure with reduced ejection fraction or HFrEF (EF 20-25% and grade II diastolic heart failure) in February. Repeat echocardiogram (June 5) showed and ejection fraction of 17% with resolution of the left apical thrombus. Continue taking lisinopril 5 mg daily, carvedilol 6.25 mg BID, and furosemide (Lasix) 40 mg daily.  Also continue on spirinolactone 12.5 (half tablet) mg every evening. No further warfarin (Coumadin) per cardiology. 2. Electrophysiology referral for ICD evaluation or possible cardiac resynchronization.  Repeat echo on medications shows a lower ejection fraction (below 35%) and definitely indicates that an implantable defibrillator would be indicated. ICD placement pending. 3. Coronary artery disease: continue taking aspirin 81 mg daily and atorvastatin (Lipitor) 40 mg nightly.  LDL (bad cholesterol) is reasonable at 69 on 3/20/20 (goal less than 70).    4. History of smoking:  Spirometry (pulmonary function test) showed moderate restriction or 72% predicted.  START using an inhaler, tiotropium (Spiriva) ONE puff every morning. This should help with cardiac rehab. Prescription resent to [facility] . 5. Elevated TSH:  Minimal elevation. Not at threshold for thyroid replacement therapy.  Recheck in the fall after the ICD placement. Follow-up in two months AFTER ICD placement  Be sure to call Dr. Brown Wolof office to schedule ICD placement. Labs to include potassium level on furosemide and recheck INR (warfarin) to make sure that has normalized off warfarin.

## 2020-09-30 ENCOUNTER — APPOINTMENT (OUTPATIENT)
Dept: ULTRASOUND IMAGING | Age: 50
End: 2020-09-30
Payer: MEDICAID

## 2020-09-30 ENCOUNTER — HOSPITAL ENCOUNTER (EMERGENCY)
Age: 50
Discharge: HOME | End: 2020-09-30
Attending: EMERGENCY MEDICINE
Payer: MEDICAID

## 2020-09-30 ENCOUNTER — APPOINTMENT (OUTPATIENT)
Dept: GENERAL RADIOLOGY | Age: 50
End: 2020-09-30
Payer: MEDICAID

## 2020-09-30 VITALS
HEIGHT: 74 IN | DIASTOLIC BLOOD PRESSURE: 85 MMHG | RESPIRATION RATE: 15 BRPM | SYSTOLIC BLOOD PRESSURE: 117 MMHG | WEIGHT: 212 LBS | OXYGEN SATURATION: 94 % | TEMPERATURE: 98 F | BODY MASS INDEX: 27.21 KG/M2 | HEART RATE: 65 BPM

## 2020-09-30 LAB
A/G RATIO: 1.7 (ref 1.1–2.2)
ALBUMIN SERPL-MCNC: 4.3 G/DL (ref 3.4–5)
ALP BLD-CCNC: 70 U/L (ref 40–129)
ALT SERPL-CCNC: 35 U/L (ref 10–40)
ANION GAP SERPL CALCULATED.3IONS-SCNC: 9 MMOL/L (ref 3–16)
AST SERPL-CCNC: 21 U/L (ref 15–37)
BASOPHILS ABSOLUTE: 0.1 K/UL (ref 0–0.2)
BASOPHILS RELATIVE PERCENT: 1 %
BILIRUB SERPL-MCNC: 0.5 MG/DL (ref 0–1)
BUN BLDV-MCNC: 20 MG/DL (ref 7–20)
CALCIUM SERPL-MCNC: 9.1 MG/DL (ref 8.3–10.6)
CHLORIDE BLD-SCNC: 100 MMOL/L (ref 99–110)
CO2: 22 MMOL/L (ref 21–32)
CREAT SERPL-MCNC: 0.8 MG/DL (ref 0.9–1.3)
EKG ATRIAL RATE: 60 BPM
EKG DIAGNOSIS: NORMAL
EKG P AXIS: 30 DEGREES
EKG P-R INTERVAL: 230 MS
EKG Q-T INTERVAL: 498 MS
EKG QRS DURATION: 182 MS
EKG QTC CALCULATION (BAZETT): 498 MS
EKG R AXIS: -71 DEGREES
EKG T AXIS: 25 DEGREES
EKG VENTRICULAR RATE: 60 BPM
EOSINOPHILS ABSOLUTE: 0.1 K/UL (ref 0–0.6)
EOSINOPHILS RELATIVE PERCENT: 1.2 %
GFR AFRICAN AMERICAN: >60
GFR NON-AFRICAN AMERICAN: >60
GLOBULIN: 2.6 G/DL
GLUCOSE BLD-MCNC: 135 MG/DL (ref 70–99)
HCT VFR BLD CALC: 42.9 % (ref 40.5–52.5)
HEMOGLOBIN: 15.1 G/DL (ref 13.5–17.5)
LYMPHOCYTES ABSOLUTE: 2.1 K/UL (ref 1–5.1)
LYMPHOCYTES RELATIVE PERCENT: 22.1 %
MCH RBC QN AUTO: 33.4 PG (ref 26–34)
MCHC RBC AUTO-ENTMCNC: 35.2 G/DL (ref 31–36)
MCV RBC AUTO: 94.9 FL (ref 80–100)
MONOCYTES ABSOLUTE: 1.2 K/UL (ref 0–1.3)
MONOCYTES RELATIVE PERCENT: 12 %
NEUTROPHILS ABSOLUTE: 6.2 K/UL (ref 1.7–7.7)
NEUTROPHILS RELATIVE PERCENT: 63.7 %
PDW BLD-RTO: 13 % (ref 12.4–15.4)
PLATELET # BLD: 146 K/UL (ref 135–450)
PMV BLD AUTO: 9.6 FL (ref 5–10.5)
POTASSIUM REFLEX MAGNESIUM: 3.6 MMOL/L (ref 3.5–5.1)
PRO-BNP: 931 PG/ML (ref 0–124)
RBC # BLD: 4.51 M/UL (ref 4.2–5.9)
SODIUM BLD-SCNC: 131 MMOL/L (ref 136–145)
TOTAL PROTEIN: 6.9 G/DL (ref 6.4–8.2)
TROPONIN: <0.01 NG/ML
TROPONIN: <0.01 NG/ML
WBC # BLD: 9.7 K/UL (ref 4–11)

## 2020-09-30 PROCEDURE — 99285 EMERGENCY DEPT VISIT HI MDM: CPT

## 2020-09-30 PROCEDURE — 83880 ASSAY OF NATRIURETIC PEPTIDE: CPT

## 2020-09-30 PROCEDURE — 76705 ECHO EXAM OF ABDOMEN: CPT

## 2020-09-30 PROCEDURE — 93005 ELECTROCARDIOGRAM TRACING: CPT | Performed by: EMERGENCY MEDICINE

## 2020-09-30 PROCEDURE — 96375 TX/PRO/DX INJ NEW DRUG ADDON: CPT

## 2020-09-30 PROCEDURE — 85025 COMPLETE CBC W/AUTO DIFF WBC: CPT

## 2020-09-30 PROCEDURE — 6360000002 HC RX W HCPCS: Performed by: EMERGENCY MEDICINE

## 2020-09-30 PROCEDURE — 96374 THER/PROPH/DIAG INJ IV PUSH: CPT

## 2020-09-30 PROCEDURE — 80053 COMPREHEN METABOLIC PANEL: CPT

## 2020-09-30 PROCEDURE — 93010 ELECTROCARDIOGRAM REPORT: CPT | Performed by: INTERNAL MEDICINE

## 2020-09-30 PROCEDURE — 6370000000 HC RX 637 (ALT 250 FOR IP): Performed by: EMERGENCY MEDICINE

## 2020-09-30 PROCEDURE — 84484 ASSAY OF TROPONIN QUANT: CPT

## 2020-09-30 PROCEDURE — 71045 X-RAY EXAM CHEST 1 VIEW: CPT

## 2020-09-30 PROCEDURE — 2500000003 HC RX 250 WO HCPCS: Performed by: EMERGENCY MEDICINE

## 2020-09-30 RX ORDER — ONDANSETRON 2 MG/ML
4 INJECTION INTRAMUSCULAR; INTRAVENOUS ONCE
Status: COMPLETED | OUTPATIENT
Start: 2020-09-30 | End: 2020-09-30

## 2020-09-30 RX ADMIN — LIDOCAINE HYDROCHLORIDE: 20 SOLUTION ORAL; TOPICAL at 05:27

## 2020-09-30 RX ADMIN — ONDANSETRON HYDROCHLORIDE 4 MG: 2 INJECTION, SOLUTION INTRAMUSCULAR; INTRAVENOUS at 05:27

## 2020-09-30 RX ADMIN — FAMOTIDINE 20 MG: 10 INJECTION INTRAVENOUS at 05:27

## 2020-09-30 ASSESSMENT — PAIN SCALES - GENERAL: PAINLEVEL_OUTOF10: 4

## 2020-09-30 NOTE — PROGRESS NOTES
[facility], [place], operated by [facility]   Cardiac Followup    Referring Provider:  Johanna Ortiz MD     Chief Complaint   Patient presents with    3 Month Follow-Up     Cardiac follow up for CAD, Ischemic Cardiomyopathy    Other     No new cardiac complaints      Subjective: Patient is being seen today for cardiology follow up ischemic CM, CAD, hx apical thrombus; no complaints today    Past Medical History:  Indra Moreland a 47 y. o. patient admitted [facility] 2/12/20 with c/o SOB. Prior saw Dr. Vani Reyna 5/7/14. No cardiology f/u or meds afterwards. He has PMH small inferior MI in 2012 with OLU to RCA, ischemic CM EF<20%, apical thrombus dx 2/20 on warfarin temporarily (resolved and off AC), hx remote afib during PCI s/p CV, HLD, and chronic tobacco abuse.  [facility] [address] 12/13/12 Severe 1V CAD with 99% mid-RCA stenosis with thrombus and DARLING-2 distal flow D2 with diffuse ostial-proximal 30% plaquing; EF 40%; s/p thrombectomy/stenting of the mid-RCA with a 4.0x 15 mm RESOLUTE OLU.   During the procedure he went into afib and cardioverted to NSR.       Admitted mid-Feb 2020 and diagnosed with ischemic CM, acute sysolic CHF, and apical thrombus. Most recent Lexiscan myoview stress test 02/13/20 showed severely reduced LVEF 27%; more prominent  inferoapical HK noted; Severely enlarged LV. Fixed inferoapical and lateral defects c/w scar showed no ischemia. No LHC recommended with only medical management. Note ECHO 02/13/20 EF=20-25% (EF=55-60% 12/12 study); more prominent HK of the inferolateral and apical segments. small-medium sized apical thrombus; Grade II DD; severe LAE; AV sclerosis; Mild MR. Started on CHF med regimen plus coumadin for [facility] of apical thrombus. Most recent limited ECHO 6/5/20 EF <20%. Severe global HK; Severe PATRICIA; (SPAP) is elevated and estimated at 44mmHg c/w mild pulm HTN.      Went to ER 9/30/20 for dizziness, near syncope, epigastric chest pain and abdominal pain after eating pork chop and mac n' cheese. Note MI ruled out and felt more GI related with GB sludge on abd US and was dehydrated. Most recent EKG 9/30/20 Sinus rhythm with 1st degree A-V block; RBBB; left axis; LAFB (no change 6/20 EKG). HPI:   Today he reports he is feeling better since ER visit yesterday. No new complaints. He will get SOB with hills and inclines but feels like his breathing is improved. Denies chest pain,  edema, dizziness, palpitations and syncope. He quit smoking on Sunday, and hopes to stay off cigs. He saw EP Dr. Flaco Owens in 6/20 who recommended BiV/ICD but not scheduled yet due him delaying it. He thought he was going to travel to see daughter but did not end up going. Past Medical History:   has a past medical history of CAD (coronary artery disease), Heart attack ([address] and Neck pain. Surgical History:   has a past surgical history that includes cervical fusion and Cardiac surgery. Social History:   reports that he has been smoking cigarettes. He has a 14.00 pack-year smoking history. He has never used smokeless tobacco. He reports current alcohol use of about 2.0 standard drinks of alcohol per week. He reports that he does not use drugs. Family History:  family history includes Diabetes in his mother; Heart Disease in his father and mother. Home Medications:  Prior to Admission medications    Medication Sig Start Date End Date Taking?  Authorizing Provider   tiotropium (SPIRIVA HANDIHALER) 18 MCG inhalation capsule Inhale 1 capsule into the lungs daily 8/25/20  Yes Rose Santoyo MD   carvedilol (COREG) 6.25 MG tablet Take 1 tablet by mouth 2 times daily (with meals) 7/30/20  Yes Chacho Cruz MD   spironolactone (ALDACTONE) 25 MG tablet Take 0.5 tablets by mouth daily 6/23/20  Yes Rose Santoyo MD   atorvastatin (LIPITOR) 40 MG tablet Take 1 tablet by mouth nightly 6/23/20  Yes Rose Santoyo MD   lisinopril (PRINIVIL;ZESTRIL) 5 MG tablet Take 1 tablet by mouth daily 6/18/20 Normal breath sounds without dullness  Cardiovascular:  · The apical impulses not displaced  · Heart tones are crisp and normal  · Cervical veins are not engorged  · The carotid upstroke is normal in amplitude and contour without delay or bruit  · Normal S1S2, No S3, No Murmur  · Peripheral pulses are symmetrical and full  · There is no clubbing, cyanosis of the extremities. · No edema  · Femoral Arteries: 2+ and equal  · Pedal Pulses: 2+ and equal   Abdomen:  · No masses or tenderness  · Liver/Spleen: No Abnormalities Noted  Neurological/Psychiatric:  · Alert and oriented in all spheres  · Moves all extremities well  · Exhibits normal gait balance and coordination  · No abnormalities of mood, affect, memory, mentation, or behavior are noted        Skin: warm and dry    Total cholesterol:  120  RC=312    Lab Results   Component Value Date    HDL 30 (L) 03/20/2020     Lab Results   Component Value Date    LDLCALC 69 03/20/2020     Lab Results   Component Value Date    LABVLDL 21 03/20/2020      lipids 3/20/13   HDL 22 LDL 28    Assessment:     1. Coronary artery disease involving native coronary artery of native heart without angina pectoris:  [facility] [address] 12/13/12 Severe 1V CAD with 99% mid-RCA stenosis with thrombus and DARLING-2 distal flow D2 with diffuse ostial-proximal 30% plaquing; EF 40%;  Successful aspiration thrombectomy/stenting of the mid-RCA s/p OLU. 2. Acute systolic (congestive) heart failure ([address]): Clinically compensated NYHA Class II and will continue current CHF medical regimen. 3. Ischemic cardiomyopathy: Most recent ECHO 02/13/20 EF of 20-25% (EF=55-60% 12/12 study); more prominent HK inferolateral and apical segments. small-medium sized apical thrombus. Most recent limited ECHO 6/5/20 EF <20%; no evidence for thrombus. Unfortunately, LVEF not improved. Will titrate aldactone to 25mg daily and continue CHF regimen of lasix, coreg, and lisinopril.        4. Warfarin anticoagulation: Apical thrombus resolved and NO AC. See #3 above. Enrolled in [facility]. NO thrombus seen on 6/5/20 ECHO. Continue baby aspirin only for hx CAD and MI prior. 5.      Most recent lipids 3/20/20 I personally reviewed lab results in epic (see above) and discussed with patient. Well controlled except for lower HDL and will continue current medical regimen. Plan:  1. Meds reviewed. No refills today per pt request.  2. I would like him to increase his Spironolactone to 1 full tab = 25 mg 1 daily    3. Strongly encouraged to call Dr Abe Gunn Nurse [place] [facility] [phone] to schedule ICD placement   4. I would prefer he pursue ICD placement 1st and follow up with surgeon later  5. Follow up with my NP in 1 month and myself in 3-4 months     6. Check BMP in 7-10 days. He will need FLP next OV. 7. He will be making general surgery consult for GB issues. Cost of prescription medications and patient compliance have been reviewed with patient. All questions answered. Thank you for allowing me to participate in the care of this individual.    This note was scribed in the presence of Dimple Verma MD by Aidee Nolen, RN    I, Dr. Gutierrez Ledesma, personally performed the services described in this documentation, as scribed by the above signed scribe in my presence. It is both accurate and complete to my knowledge. I agree with the details independently gathered by the clinical support staff, while the remaining scribed note accurately describes my personal service to the patient. Cristel Burgos.  Ivanna Baltazar M.D., [facility]

## 2020-09-30 NOTE — ED PROVIDER NOTES
Emergency Physician Note  [address] 11 [facility] ED  [address] [facility] [place] [address]  Dept: [phone]  Loc: [phone]    Chief Complaint  Chest Pain (starting overnight with mid sternal chest pain and pressure. dizziness at home)       History of Present Illness  Devi Allen is a 47 y.o. male  has a past medical history of CAD (coronary artery disease), Heart attack ([address] and Neck pain. who presents to the ED for epigastric pain. I clarified with the patient multiple times that this pain is epigastric and it is not midsternal nor is it lower sternal.  He states the pain started almost immediately after eating a dinner that consisted of lightly fried pork chops and mac & cheese. Pain was very intense, describes a burning sensation, associated with nausea but without vomiting. Did not radiate. When I asked about the dizziness he reports that he states the pain got so intense that he felt lightheaded at time however denies any vertigo or syncope. Denies fever, chills, malaise, chest pain, shortness of breath, cough,  vomiting, diarrhea,  back pain, rash. No palliative/provocative factors. Unless otherwise stated in this report or unable to obtain because of the patient's clinical or mental status as evidenced by the medical record, this patient's positive and negative responses for review of systems, constitutional, psych, eyes, ENT, cardiovascular, respiratory, gastrointestinal, neurological, genitourinary, musculoskeletal, integument systems and systems related to the presenting problem are either stated in the preceding paragraph or were not pertinent or were negative for the symptoms and/or complaints related to the medical problem. I have reviewed the following from the nursing documentation:      Prior to Admission medications    Medication Sig Start Date End Date Taking?  Authorizing Provider   tiotropium (Darlaurela Cookie) 18 MCG inhalation capsule Inhale 1 capsule into the lungs daily 8/25/20   Nisreen Brown MD   carvedilol (COREG) 6.25 MG tablet Take 1 tablet by mouth 2 times daily (with meals) 7/30/20   Heather Contreras MD   spironolactone (ALDACTONE) 25 MG tablet Take 0.5 tablets by mouth daily 6/23/20   Nisreen Brown MD   atorvastatin (LIPITOR) 40 MG tablet Take 1 tablet by mouth nightly 6/23/20   Nisreen Brown MD   lisinopril (PRINIVIL;ZESTRIL) 5 MG tablet Take 1 tablet by mouth daily 6/18/20   Heather Contreras MD   furosemide (LASIX) 40 MG tablet Take 1 tablet by mouth daily 6/18/20   Heather Contreras MD   aspirin 81 MG chewable tablet Take 1 tablet by mouth daily 2/15/20   ERVIN Herring - CNP   traMADol (ULTRAM) 50 MG tablet Take 100 mg by mouth every 8 hours as needed for Pain.      Historical Provider, MD       Allergies as of 09/30/2020 - Review Complete 09/30/2020   Allergen Reaction Noted    Statins  12/13/2012       Past Medical History:   Diagnosis Date    CAD (coronary artery disease)     [facility] ([facility]     Neck pain         Surgical History:   Past Surgical History:   Procedure Laterality Date    CARDIAC SURGERY      2012 at [address] [facility] stent placed     CERVICAL FUSION      2009 at [address] History:    Family History   Problem Relation Age of Onset    Diabetes Mother     Heart Disease Mother     Heart Disease Father        Social History     Socioeconomic History    Marital status:      Spouse name: Not on file    Number of children: Not on file    Years of education: Not on file    Highest education level: Not on file   Occupational History    Not on file   Social Needs    Financial resource strain: Not on file    Food insecurity     Worry: Not on file     Inability: Not on file    Transportation needs     Medical: Not on file     Non-medical: Not on file   Tobacco Use    Smoking status: Current Every Day Smoker     Packs/day: 1.00     Years: 28.00     Pack years: 28.00     Types: Cigarettes     Last attempt to quit: 2020     Years since quittin.6    Smokeless tobacco: Never Used    Tobacco comment: Pt would like to quit   Substance and Sexual Activity    Alcohol use: Yes     Alcohol/week: 2.0 standard drinks     Types: 2 Shots of liquor per week    Drug use: No    Sexual activity: Yes     Partners: Female   Lifestyle    Physical activity     Days per week: Not on file     Minutes per session: Not on file    Stress: Not on file   Relationships    Social connections     Talks on phone: Not on file     Gets together: Not on file     Attends Bahai service: Not on file     Active member of club or organization: Not on file     Attends meetings of clubs or organizations: Not on file     Relationship status: Not on file    Intimate partner violence     Fear of current or ex partner: Not on file     Emotionally abused: Not on file     Physically abused: Not on file     Forced sexual activity: Not on file   Other Topics Concern    Not on file   Social History Narrative    Not on file       Nursing notes reviewed. ED Triage Vitals [20 0230]   Enc Vitals Group      /75      Pulse 63      Resp 18      Temp 98 °F (36.7 °C)      Temp Source Oral      SpO2 97 %      Weight 212 lb (96.2 kg)      Height 6' 2\" (1.88 m)      Head Circumference       Peak Flow       Pain Score       Pain Loc       Pain Edu? Excl. in [address]? GENERAL:    Awake, alert. Well developed, well nourished with no apparent distress. Nontoxic-appearing, non-ill-appearing. HENT:    Normocephalic, Atraumatic, no lacerations. No ENT exam due to PPE. EYES:    Conjunctiva normal,   Pupils equal round and reactive to light,   Extraocular movements normal.  NECK:    Trachea is midline. No stridor. CHEST:    Regular rate and regular rhythm, no murmurs/rubs/gallops,   normal S1/S2, chest wall non-tender. LUNGS:    No respiratory distress.     No Physician  View: Portable chest xray  Findings: See radiology report    LABS  Results for orders placed or performed during the hospital encounter of 09/30/20   CBC Auto Differential   Result Value Ref Range    WBC 9.7 4.0 - 11.0 K/uL    RBC 4.51 4.20 - 5.90 M/uL    Hemoglobin 15.1 13.5 - 17.5 g/dL    Hematocrit 42.9 40.5 - 52.5 %    MCV 94.9 80.0 - 100.0 fL    MCH 33.4 26.0 - 34.0 pg    MCHC 35.2 31.0 - 36.0 g/dL    RDW 13.0 12.4 - 15.4 %    Platelets 049 052 - 434 K/uL    MPV 9.6 5.0 - 10.5 fL    Neutrophils % 63.7 %    Lymphocytes % 22.1 %    Monocytes % 12.0 %    Eosinophils % 1.2 %    Basophils % 1.0 %    Neutrophils Absolute 6.2 1.7 - 7.7 K/uL    Lymphocytes Absolute 2.1 1.0 - 5.1 K/uL    Monocytes Absolute 1.2 0.0 - 1.3 K/uL    Eosinophils Absolute 0.1 0.0 - 0.6 K/uL    Basophils Absolute 0.1 0.0 - 0.2 K/uL   Comprehensive Metabolic Panel w/ Reflex to MG   Result Value Ref Range    Sodium 131 (L) 136 - 145 mmol/L    Potassium reflex Magnesium 3.6 3.5 - 5.1 mmol/L    Chloride 100 99 - 110 mmol/L    CO2 22 21 - 32 mmol/L    Anion Gap 9 3 - 16    Glucose 135 (H) 70 - 99 mg/dL    BUN 20 7 - 20 mg/dL    CREATININE 0.8 (L) 0.9 - 1.3 mg/dL    GFR Non-African American >60 >60    GFR African American >60 >60    Calcium 9.1 8.3 - 10.6 mg/dL    Total Protein 6.9 6.4 - 8.2 g/dL    Alb 4.3 3.4 - 5.0 g/dL    Albumin/Globulin Ratio 1.7 1.1 - 2.2    Total Bilirubin 0.5 0.0 - 1.0 mg/dL    Alkaline Phosphatase 70 40 - 129 U/L    ALT 35 10 - 40 U/L    AST 21 15 - 37 U/L    Globulin 2.6 g/dL   Troponin   Result Value Ref Range    Troponin <0.01 <0.01 ng/mL       PROCEDURES      MEDICAL DECISION MAKING    Procedures/interventions/images ordered for this visit  Orders Placed This Encounter   Procedures    XR CHEST PORTABLE    CBC Auto Differential    Comprehensive Metabolic Panel w/ Reflex to MG    Troponin    Brain Natriuretic Peptide    Initiate Oxygen Therapy Protocol    EKG 12 Lead    Saline lock IV       Medications ordered for this visit  No orders of the defined types were placed in this encounter. ED course notes for this visit  ED Course as of Sep 30 0619   Wed Sep 30, 2020   6114 He is feeling significantly better, his abdominal pain is, is completely resolved. I discussed the results of the serial troponin. I explained to him that sometimes epigastric pain can have a cardiac origin and given his history I felt that the work-up was appropriate, patient was also fine with me not pursuing any further cardiac origin of the epigastric pain. I did also discuss the results of the ultrasound with him and explained to him that the gallbladder sludge is likely cause of his pain. Patient does admit to eating mildly fried pork and mac & cheese just prior to onset of pain. I explained to him that because of the gallbladder he needs to switch to a nonfat diet in order to avoid any further episodes like this. He is is also aware that ultimately solution will be to get his gallbladder surgically removed. [SG]      ED Course User Index  [SG] Buck Langston MD     REVIEW OF PREVIOUS RECORDS  I have reviewed the old records of Pete Blake which reveal the following pertinent information:      He has PMH small inferior MI in 2012 with OLU to RCA, ischemic CM EF=20-25%, apical thrombus dx 2/20 on warfarin, hx remote afib during PCI s/p CV, HLD, and chronic tobacco abuse.      Most [address] 12/13/12 Severe 1V CAD with 99% mid-RCA stenosis with thrombus and DARLING-2 distal flow D2 with diffuse ostial-proximal 30% plaquing; EF 40%; s/p thrombectomy/stenting of the mid-RCA with a 4.0x 15 mm RESOLUTE OLU.   During the procedure he went into afib and cardioverted to NSR.     Admitted mid-February 2020 with diagnosis of ischemic CM with acute systolic CHF.  Also apical thrombus visualized.     Most recent Lexiscan myoview stress test 02/13/20 showed severely reduced LVEF 27%; more prominent  inferoapical HK noted; Severely sepsis, hemodynamic or cardiopulmonary instability, acute cholecystitis, AAA, bowel obstruction, bowel perforation, herpes zoster,  a cardiac or pulmonary etiology as a cause for the abdominal symptoms, or any disease process requiring other immediate surgical or medical admission at this time. Pain management was discussed. It is understood that if they are not improving as expected or if other new symptoms or signs of concern develop, other etiologies or diagnoses may need to be considered requiring other tests, treatments, consultations, and/or admission. The diagnosis, plan, expected course, follow-up, and return precautions were discussed and all questions were answered. Final Impression    1. Abdominal pain, epigastric    2. Postprandial epigastric pain    3. Gallbladder sludge        Blood pressure 117/85, pulse 65, temperature 98 °F (36.7 °C), temperature source Oral, resp. rate 15, height 6' 2\" (1.88 m), weight 212 lb (96.2 kg), SpO2 94 %. Disposition  At this point I do not feel the patient requires further work up and it is reasonable to discharge the patient. I had a discussion with the patient and/or their surrogate regarding diagnosis, diagnostic testing results, treatment/ plan of care, and follow up. Patient and/or companions verbalized understanding of the ED workup, any relevant findings as well as any incidental findings, and the disposition and plan. There was shared decision-making between myself as well as the patient and/or their surrogate and we are all in agreement with discharge home. There was an opportunity for questions and all questions were answered to the best of my ability and to the satisfaction of the patient and/or patient family. Patient agreed to follow upas recommend for further evaluation/treatment. The patient was given strict return precautions as we discussed symptoms that would necessitate return to the ED.  Patient will return to ED for new/worsening symptoms. The patient verbalized their understanding and agreement with the above plan. Please refer to AVS for further details regarding discharge instructions. Patient was given scripts for the following medications. I counseled patient how to take these medications. Discharge Medication List as of 9/30/2020  6:01 AM          Pt is in stable condition upon Discharge to home. The note was completed using Dragon voice recognition transcription. Every effort was made to ensure accuracy; however, inadvertent transcription errors may be present despite my best efforts to edit errors.     Mehdi Ortega MD  [address] [place] [address]        Mehdi Ortega MD  09/30/20 5308

## 2020-10-01 ENCOUNTER — OFFICE VISIT (OUTPATIENT)
Dept: CARDIOLOGY CLINIC | Age: 50
End: 2020-10-01
Payer: MEDICAID

## 2020-10-01 VITALS
WEIGHT: 216 LBS | SYSTOLIC BLOOD PRESSURE: 112 MMHG | DIASTOLIC BLOOD PRESSURE: 64 MMHG | HEIGHT: 74 IN | OXYGEN SATURATION: 97 % | TEMPERATURE: 97.4 F | HEART RATE: 68 BPM | BODY MASS INDEX: 27.72 KG/M2

## 2020-10-01 PROCEDURE — G8419 CALC BMI OUT NRM PARAM NOF/U: HCPCS | Performed by: INTERNAL MEDICINE

## 2020-10-01 PROCEDURE — 3017F COLORECTAL CA SCREEN DOC REV: CPT | Performed by: INTERNAL MEDICINE

## 2020-10-01 PROCEDURE — G8427 DOCREV CUR MEDS BY ELIG CLIN: HCPCS | Performed by: INTERNAL MEDICINE

## 2020-10-01 PROCEDURE — 4004F PT TOBACCO SCREEN RCVD TLK: CPT | Performed by: INTERNAL MEDICINE

## 2020-10-01 PROCEDURE — 99214 OFFICE O/P EST MOD 30 MIN: CPT | Performed by: INTERNAL MEDICINE

## 2020-10-01 PROCEDURE — G8484 FLU IMMUNIZE NO ADMIN: HCPCS | Performed by: INTERNAL MEDICINE

## 2020-10-01 NOTE — PATIENT INSTRUCTIONS
Plan:  1. Meds reviewed. No refills today per pt request.  2.   I would like him to increase his Spironolactone to 1 full tab = 25 mg 1 daily    3. Advised to call Dr Marialuisa Butterfield Nurse Lisandro Garcia [phone] to schedule ICD placement   4. I would prefer he pursue ICD placement 1st and follow up with surgeon later  5. Follow up with my NP in 1 month and myself in 3-4 months     6.  Check BMP in 7-10 days

## 2020-10-12 ENCOUNTER — TELEPHONE (OUTPATIENT)
Dept: CARDIOLOGY CLINIC | Age: 50
End: 2020-10-12

## 2020-10-13 NOTE — TELEPHONE ENCOUNTER
Spoke with patient. He reports he has been taking his carvedilol and lisinopril as prescribed for at least three months. Informed patient he needed to have a limited ECHO to assess EF. Verbalized understanding. Central Scheduling number given. Reported he would call office to inform of date when echo is scheduled. Will call patient to schedule ICD placement with [facility] once echo results are completed. Patient verbalized understanding.

## 2020-10-13 NOTE — TELEPHONE ENCOUNTER
Attempted to call patient. No Answer. LM to return call. Last OV with [address] 6/11/20    Patient has since decided to proceed with ICD placement . Per verbal conversation with [address] patient needs to have a limited ECHO to assess for EF and if EF remains below 35% we may move forward with ICD placement. Also need to assess if patient has been taking his lisinopril and carvedilol as prescribed for 3 months. Limited echo order placed.

## 2020-11-03 ENCOUNTER — HOSPITAL ENCOUNTER (OUTPATIENT)
Dept: NON INVASIVE DIAGNOSTICS | Age: 50
Discharge: HOME | End: 2020-11-03
Payer: MEDICAID

## 2020-11-03 PROCEDURE — 6360000004 HC RX CONTRAST MEDICATION: Performed by: INTERNAL MEDICINE

## 2020-11-03 PROCEDURE — 93308 TTE F-UP OR LMTD: CPT

## 2020-11-03 RX ADMIN — PERFLUTREN 3 ML: 6.52 INJECTION, SUSPENSION INTRAVENOUS at 10:27

## 2020-11-09 ENCOUNTER — OFFICE VISIT (OUTPATIENT)
Dept: CARDIOLOGY CLINIC | Age: 50
End: 2020-11-09
Payer: MEDICAID

## 2020-11-09 VITALS
OXYGEN SATURATION: 96 % | WEIGHT: 220 LBS | BODY MASS INDEX: 28.23 KG/M2 | HEIGHT: 74 IN | TEMPERATURE: 97.3 F | HEART RATE: 69 BPM | SYSTOLIC BLOOD PRESSURE: 100 MMHG | DIASTOLIC BLOOD PRESSURE: 60 MMHG

## 2020-11-09 PROCEDURE — 3017F COLORECTAL CA SCREEN DOC REV: CPT | Performed by: NURSE PRACTITIONER

## 2020-11-09 PROCEDURE — 4004F PT TOBACCO SCREEN RCVD TLK: CPT | Performed by: NURSE PRACTITIONER

## 2020-11-09 PROCEDURE — G8484 FLU IMMUNIZE NO ADMIN: HCPCS | Performed by: NURSE PRACTITIONER

## 2020-11-09 PROCEDURE — G8427 DOCREV CUR MEDS BY ELIG CLIN: HCPCS | Performed by: NURSE PRACTITIONER

## 2020-11-09 PROCEDURE — G8419 CALC BMI OUT NRM PARAM NOF/U: HCPCS | Performed by: NURSE PRACTITIONER

## 2020-11-09 PROCEDURE — 99214 OFFICE O/P EST MOD 30 MIN: CPT | Performed by: NURSE PRACTITIONER

## 2020-11-09 RX ORDER — SPIRONOLACTONE 25 MG/1
25 TABLET ORAL DAILY
Qty: 30 TABLET | Refills: 5 | Status: SHIPPED | OUTPATIENT
Start: 2020-11-09 | End: 2021-09-09

## 2020-11-09 ASSESSMENT — ENCOUNTER SYMPTOMS
RESPIRATORY NEGATIVE: 1
GASTROINTESTINAL NEGATIVE: 1

## 2020-11-09 NOTE — PROGRESS NOTES
[address]   Cardiology Note              Date:  November 8, 2020  Patientname: Vika Yates  YOB: 1970    Primary Care physician: Facundo Petty MD    HISTORY OF PRESENT ILLNESS: Vika Yates is a 45 y.o. male with a history of CAD, cardiomyopathy, CHF, LV thrombus, AF, HLD. He has a history of inferior MI in 2012 with thrombectomy and OLU RCA. He had AF during PCI, treated with CV. EF 55-60% on echo. He did not follow up after 2014. He was admitted 2/2020 for shortness of breath. Echo showed EF 20-25%, +apical thrombus. Stress test 2/2020 showed fixed defects, no ischemia. Echo 6/5/2020 showed EF <20%. Echo 11/3/2020 showed EF 25-30%, probably apical thrombus. Today he presents for follow up for CAD, CHF, cardiomyopathy. He feels good. Denies chest pain, shortness of breath, palpitations and dizziness. He is active at home but is hindered by back pain. BP and weight stable at home. He smokes. Home weights: 213 lbs    Past Medical History:   has a past medical history of CAD (coronary artery disease), Heart attack ([address] and Neck pain. Past Surgical History:   has a past surgical history that includes cervical fusion and Cardiac surgery. Home Medications:    Prior to Admission medications    Medication Sig Start Date End Date Taking?  Authorizing Provider   tiotropium (SPIRIVA HANDIHALER) 18 MCG inhalation capsule Inhale 1 capsule into the lungs daily 8/25/20   Natalia Fall MD   carvedilol (COREG) 6.25 MG tablet Take 1 tablet by mouth 2 times daily (with meals) 7/30/20   Marybeth Terrazas MD   spironolactone (ALDACTONE) 25 MG tablet Take 0.5 tablets by mouth daily  Patient taking differently: Take 25 mg by mouth daily  6/23/20   Natalia Fall MD   atorvastatin (LIPITOR) 40 MG tablet Take 1 tablet by mouth nightly 6/23/20   Natalia Fall MD   lisinopril (PRINIVIL;ZESTRIL) 5 MG tablet Take 1 tablet by mouth daily 6/18/20   [facility] Harlan Escalera MD   furosemide (LASIX) 40 MG tablet Take 1 tablet by mouth daily 6/18/20   Rk Calderon MD   aspirin 81 MG chewable tablet Take 1 tablet by mouth daily 2/15/20   ERVIN Ahn - CNP   traMADol (ULTRAM) 50 MG tablet Take 100 mg by mouth every 8 hours as needed for Pain. Historical Provider, MD     Allergies:  Statins    Social History:   reports that he has been smoking cigarettes. He has a 14.00 pack-year smoking history. He has never used smokeless tobacco. He reports current alcohol use of about 2.0 standard drinks of alcohol per week. He reports that he does not use drugs. Family History: family history includes Diabetes in his mother; Heart Disease in his father and mother. Review of Systems   Review of Systems   Constitutional: Negative. Respiratory: Negative. Cardiovascular: Negative. Gastrointestinal: Negative. Neurological: Negative. OBJECTIVE:    Vital signs:    /60   Pulse 69   Temp 97.3 °F (36.3 °C)   Ht 6' 2\" (1.88 m)   Wt 220 lb (99.8 kg)   SpO2 96%   BMI 28.25 kg/m²      Physical Exam:  Constitutional:  Comfortable and alert, NAD, appears stated age  Eyes: PERRL, sclera nonicteric  Neck:  Supple, no masses, no thyroidmegaly, no JVD  Skin:  Warm and dry; no rash or lesions  Heart: Regular, normal apex, S1 and S2 normal, no M/G/R  Lungs:  Normal respiratory effort; clear; no wheezing/rhonchi/rales  Abdomen: soft, non tender, + bowel sounds  Extremities:  No edema or cyanosis; no clubbing  Neuro: alert and oriented, moves legs and arms equally, normal mood and affect    Data Reviewed:      Echo 11/3/2020:  Limited study for cardiomyopathy and left ventricle systolic function. Definity is used for thrombus detection. Left ventricular systolic function is severely reduced with a visually   estimated ejection fraction of 25-30%. EF estimated by Gu's method at 30   %.  The left ventricle is mildly dilated in size with normal wall Angioseal device closure of the right femoral arteriotomy  During the procedure he went into atrial fibrillation was was successfully cardioverted into a sinus rhythm. No further post operative complications were noted. He was started on the appropriate medication and was counseled on each medication. Smoking cessation was discussed. He will be discharged today with instructions to follow-up with Dr Marcio Márquez at the [facility] in 7 to 10 days. Cardiology Labs Reviewed:   CBC: No results for input(s): WBC, HGB, HCT, PLT in the last 72 hours. BMP:No results for input(s): NA, K, CO2, BUN, CREATININE, LABGLOM, GLUCOSE in the last 72 hours. PT/INR: No results for input(s): PROTIME, INR in the last 72 hours. APTT:No results for input(s): APTT in the last 72 hours. FASTING LIPID PANEL:  Lab Results   Component Value Date    HDL 30 03/20/2020    LDLCALC 69 03/20/2020     LIVER PROFILE:No results for input(s): AST, ALT, ALB in the last 72 hours. BNP:   Lab Results   Component Value Date    PROBNP 931 09/30/2020    PROBNP 445 02/15/2020    PROBNP 1,364 02/12/2020     Reviewed all labs and imaging today    Assessment:   CAD: no angina; no ischemia on stress test 2/2020; s/p OLU RCA in the setting of MI 12/2012  Ischemic cardiomyopathy: ongoing, EF 25-30% on echo 11/2020 from <20% on echo 6/2020 and 2/2020   - BiV ICD recommended  Chronic combined CHF: appears compensated  LV thrombus: probable on echo 11/2020; originally noted 2/2020  RBBB  Paroxysmal atrial fibrillation: noted 12/2012 post PCI with MI, s/p CV, no known recurrence  HLD: stable, LDL 69, continue statin  Tobacco abuse: counseled    Plan:   1. Reviewed 11/3/2020 echo and probable apical thrombus noted. Would resume coumadin with dosing through coumadin clinic. 2. Update lipids, LFTs, BMP, BNP  3. Continue spironolactone 25 mg daily, carvedilol, lisinopril, aspirin, statin, lasix  4.  Proceed with BiV ICD as originally discussed with Dr. David Figueroa, all questions answered  5. Will plan on limited echo to reassess LV thrombus.    6. Follow up as planned with Dr. Xu Rick, [address]  [phone]

## 2020-11-09 NOTE — TELEPHONE ENCOUNTER
Pt needs refill on spironolacrtone due to the increased dose on 10/01/2020 appt.  See [facility] notes:\"I would like him to increase his Spironolactone to 1 full tab\"    :  [address] [place] [address] [address] [phone] - F [phone]

## 2020-11-09 NOTE — PATIENT INSTRUCTIONS
Everything looks great today, good job!   Continue current medications  Call Nancy Miranda with Dr. Chaz Mathews office, [phone]  Follow up in 3-4 months

## 2020-11-11 ENCOUNTER — HOSPITAL ENCOUNTER (OUTPATIENT)
Age: 50
Discharge: HOME | End: 2020-11-11
Payer: MEDICAID

## 2020-11-11 LAB
ALBUMIN SERPL-MCNC: 4.3 G/DL (ref 3.4–5)
ALP BLD-CCNC: 66 U/L (ref 40–129)
ALT SERPL-CCNC: 33 U/L (ref 10–40)
ANION GAP SERPL CALCULATED.3IONS-SCNC: 9 MMOL/L (ref 3–16)
AST SERPL-CCNC: 18 U/L (ref 15–37)
BASOPHILS ABSOLUTE: 0.1 K/UL (ref 0–0.2)
BASOPHILS RELATIVE PERCENT: 0.6 %
BILIRUB SERPL-MCNC: 0.5 MG/DL (ref 0–1)
BILIRUBIN DIRECT: <0.2 MG/DL (ref 0–0.3)
BILIRUBIN, INDIRECT: NORMAL MG/DL (ref 0–1)
BUN BLDV-MCNC: 17 MG/DL (ref 7–20)
CALCIUM SERPL-MCNC: 9.2 MG/DL (ref 8.3–10.6)
CHLORIDE BLD-SCNC: 103 MMOL/L (ref 99–110)
CHOLESTEROL, TOTAL: 144 MG/DL (ref 0–199)
CO2: 24 MMOL/L (ref 21–32)
CREAT SERPL-MCNC: 0.7 MG/DL (ref 0.9–1.3)
EOSINOPHILS ABSOLUTE: 0.2 K/UL (ref 0–0.6)
EOSINOPHILS RELATIVE PERCENT: 1.8 %
GFR AFRICAN AMERICAN: >60
GFR NON-AFRICAN AMERICAN: >60
GLUCOSE BLD-MCNC: 111 MG/DL (ref 70–99)
HCT VFR BLD CALC: 46.8 % (ref 40.5–52.5)
HDLC SERPL-MCNC: 30 MG/DL (ref 40–60)
HEMOGLOBIN: 16.2 G/DL (ref 13.5–17.5)
LDL CHOLESTEROL CALCULATED: 76 MG/DL
LYMPHOCYTES ABSOLUTE: 2.2 K/UL (ref 1–5.1)
LYMPHOCYTES RELATIVE PERCENT: 23 %
MCH RBC QN AUTO: 33 PG (ref 26–34)
MCHC RBC AUTO-ENTMCNC: 34.6 G/DL (ref 31–36)
MCV RBC AUTO: 95.1 FL (ref 80–100)
MONOCYTES ABSOLUTE: 0.8 K/UL (ref 0–1.3)
MONOCYTES RELATIVE PERCENT: 8.3 %
NEUTROPHILS ABSOLUTE: 6.3 K/UL (ref 1.7–7.7)
NEUTROPHILS RELATIVE PERCENT: 66.3 %
PDW BLD-RTO: 12.3 % (ref 12.4–15.4)
PLATELET # BLD: 165 K/UL (ref 135–450)
PMV BLD AUTO: 9.5 FL (ref 5–10.5)
POTASSIUM SERPL-SCNC: 4.3 MMOL/L (ref 3.5–5.1)
PRO-BNP: 469 PG/ML (ref 0–124)
RBC # BLD: 4.92 M/UL (ref 4.2–5.9)
SODIUM BLD-SCNC: 136 MMOL/L (ref 136–145)
TOTAL PROTEIN: 7.1 G/DL (ref 6.4–8.2)
TRIGL SERPL-MCNC: 189 MG/DL (ref 0–150)
VLDLC SERPL CALC-MCNC: 38 MG/DL
WBC # BLD: 9.5 K/UL (ref 4–11)

## 2020-11-11 PROCEDURE — 80076 HEPATIC FUNCTION PANEL: CPT

## 2020-11-11 PROCEDURE — 85025 COMPLETE CBC W/AUTO DIFF WBC: CPT

## 2020-11-11 PROCEDURE — 83880 ASSAY OF NATRIURETIC PEPTIDE: CPT

## 2020-11-11 PROCEDURE — 36415 COLL VENOUS BLD VENIPUNCTURE: CPT

## 2020-11-11 PROCEDURE — 80061 LIPID PANEL: CPT

## 2020-11-11 PROCEDURE — 80048 BASIC METABOLIC PNL TOTAL CA: CPT

## 2020-11-12 ENCOUNTER — TELEPHONE (OUTPATIENT)
Dept: CARDIOLOGY CLINIC | Age: 50
End: 2020-11-12

## 2020-11-12 NOTE — TELEPHONE ENCOUNTER
Patient has had limited echo as directed by Dr. Mk Baptiste in telephone encounter 10/12/2020. He would like to proceed with BiV ICD placement. Please schedule when able. Thanks!

## 2020-11-12 NOTE — TELEPHONE ENCOUNTER
Spoke with patient, gave him the lab results. He wants to schedule his device placement also. I will let Jen know.

## 2020-11-12 NOTE — TELEPHONE ENCOUNTER
----- Message from ERVIN Hutchinson CNP sent at 11/12/2020  7:44 AM EST -----  Please let him know that blood work overall stable. LDL almost to goal (<70). Liver, kidney, blood counts all stable. No changes, thanks!

## 2020-11-13 NOTE — TELEPHONE ENCOUNTER
Spoke with patient. Patient is scheduled with Dr. Humera Villatoro for [facility] ICD Implant with [employer] on 12/9/20 at 1:30pm [facility], arrival time of 12pm to the Cath Lab. Please have patient arrive to the main entrance of [facility] and check in with the registration desk. Please call patient regarding medication instructions. Remind patient to be NPO after midnight (8 hours prior). Do not apply lotions/creams on skin the day of procedure. COVID testing 12/4.

## 2020-11-16 ENCOUNTER — ANTI-COAG VISIT (OUTPATIENT)
Dept: PHARMACY | Age: 50
End: 2020-11-16
Payer: MEDICAID

## 2020-11-16 ENCOUNTER — TELEPHONE (OUTPATIENT)
Dept: CARDIOLOGY CLINIC | Age: 50
End: 2020-11-16

## 2020-11-16 LAB — INTERNATIONAL NORMALIZATION RATIO, POC: 1.1

## 2020-11-16 PROCEDURE — 99211 OFF/OP EST MAY X REQ PHY/QHP: CPT | Performed by: PHARMACIST

## 2020-11-16 PROCEDURE — 85610 PROTHROMBIN TIME: CPT | Performed by: PHARMACIST

## 2020-11-16 RX ORDER — WARFARIN SODIUM 5 MG/1
TABLET ORAL
Qty: 75 TABLET | Refills: 3 | Status: SHIPPED | OUTPATIENT
Start: 2020-11-16 | End: 2021-06-28

## 2020-11-16 NOTE — PROGRESS NOTES
Mr. Luis Enrique Sarabia is here for management of anticoagulation for left ventricular thrombus. PMH also significant for CAD, CHF. He presents today w/out complaint. Pt verifies dosing regimen as listed above. Pt denies s/s bleeding/bruising/swelling/SOB. No BRBPR. No melena. Address missed doses  Reviewed pt medication list  No changes in RX/OTCs/Herbal medications. Reviewed dietary concerns  Address EToH and tobacco use. Had been off warfarin as it was thought the thrombus had resolved. Had another scan 2 weeks ago and it appeared to still be there so he was referred back to clinic to restart warfarin. Has not restarted the warfarin yet. As last INR had been high at 5.0 in June, will restart with slightly lower dose. Pt seen in office today  97.9 deg F    INR 1.1 is below therapeutic range of 2-3. Recommend to take 10 mg daily except 12.5 mg Q M/F. Patient has 5 mg tablets. Will continue to monitor and check INR in 1 weeks. Dosing reminder card given with phone number, appointment date and time.    Return to clinic: 11/23/20 @ 0900 AM    Erna Montenegro, Natasha 4:31 PM EST 11/16/20

## 2020-11-16 NOTE — TELEPHONE ENCOUNTER
Called and spoke with patient. Relayed Jen's message. Hold diuretics and tramadol the morning of procedure.  V/U.

## 2020-11-16 NOTE — TELEPHONE ENCOUNTER
----- Message from Meir Anthony MD sent at 11/9/2020  5:05 PM EST -----  Please let patient know, LVEF has improved but still severely depressed. He still meets criteria for device. Happy to implant if he would like. Thanks.

## 2020-11-16 NOTE — TELEPHONE ENCOUNTER
Spoke with patient   Echo results discussed and patient already scheduled for BiV with [facility] next month. Verbalized understanding. Denied further questions.

## 2020-11-23 ENCOUNTER — ANTI-COAG VISIT (OUTPATIENT)
Dept: PHARMACY | Age: 50
End: 2020-11-23
Payer: MEDICAID

## 2020-11-23 LAB — INTERNATIONAL NORMALIZATION RATIO, POC: 1.7

## 2020-11-23 PROCEDURE — 85610 PROTHROMBIN TIME: CPT | Performed by: PHARMACIST

## 2020-11-23 PROCEDURE — 99211 OFF/OP EST MAY X REQ PHY/QHP: CPT | Performed by: PHARMACIST

## 2020-11-23 NOTE — PROGRESS NOTES
Mr. Kevin Ramirez is here for management of anticoagulation for left ventricular thrombus. PMH also significant for CAD, CHF. He presents today w/out complaint. Pt verifies dosing regimen as listed above. Pt denies s/s bleeding/bruising/swelling/SOB. No BRBPR. No melena. Address missed doses  Reviewed pt medication list  No changes in RX/OTCs/Herbal medications. Reviewed dietary concerns  Address EToH and tobacco use. Had been off warfarin as it was thought the thrombus had resolved. Had another scan 2 weeks ago and it appeared to still be there so he was referred back to clinic to restart warfarin. Picked up prescription and started the warfarin on Wed last week, so he has gotten 5 doses so far. INR slightly below range, but may increase some as still only 5 days of therapy so far. Will make small dose increase this week    Pt seen in office today  97.9 deg F    INR 1.7 is below therapeutic range of 2-3. Recommend to increase dose to 10 mg daily except 12.5 mg Q M/W/F. Patient has 5 mg tablets. Will continue to monitor and check INR in 1 weeks. Dosing reminder card given with phone number, appointment date and time.    Return to clinic: 11/30/20 @ 0930 AM    Priscila Hatch PharmD 9:17 AM EST 11/23/20

## 2020-11-30 ENCOUNTER — ANTI-COAG VISIT (OUTPATIENT)
Dept: PHARMACY | Age: 50
End: 2020-11-30
Payer: MEDICAID

## 2020-11-30 LAB — INTERNATIONAL NORMALIZATION RATIO, POC: 3.2

## 2020-11-30 PROCEDURE — 85610 PROTHROMBIN TIME: CPT | Performed by: PHARMACIST

## 2020-11-30 PROCEDURE — 99211 OFF/OP EST MAY X REQ PHY/QHP: CPT | Performed by: PHARMACIST

## 2020-11-30 NOTE — PROGRESS NOTES
Mr. Latasha Redmond is here for management of anticoagulation for left ventricular thrombus. PMH also significant for CAD, CHF. He presents today w/out complaint. Pt verifies dosing regimen as listed above. Pt denies s/s bleeding/bruising/swelling/SOB. No BRBPR. No melena. Address missed doses  Reviewed pt medication list  No changes in RX/OTCs/Herbal medications. Reviewed dietary concerns  Address EToH and tobacco use. Had been off warfarin as it was thought the thrombus had resolved. Had another scan 2 weeks ago and it appeared to still be there so he was referred back to clinic to restart warfarin. INR now slightly high, will cut back on dose slightly. Getting ICD implanted on 12/9. He is not sure if he needs to stop the warfarin. Will call their office today and let us know. Pt seen in office today  97.9 deg F    INR 3.2 is slightly above therapeutic range of 2-3. Recommend to decrease dose to 10 mg daily except 12.5 mg Q Fri. Patient has 5 mg tablets. Will continue to monitor and check INR in 1 weeks. Dosing reminder card given with phone number, appointment date and time.    Return to clinic: 12/7/20 @ 0900 AM    Christen Neff PharmD 9:39 AM EST 11/30/20

## 2020-12-01 NOTE — TELEPHONE ENCOUNTER
Pt called to let us know that he has been started on Coumadin due to apical thrombus. Gave him instructions regarding this for his procedure next week. He will have INR checked on Monday.

## 2020-12-04 ENCOUNTER — OFFICE VISIT (OUTPATIENT)
Dept: PRIMARY CARE CLINIC | Age: 50
End: 2020-12-04
Payer: MEDICAID

## 2020-12-04 PROCEDURE — G8428 CUR MEDS NOT DOCUMENT: HCPCS | Performed by: NURSE PRACTITIONER

## 2020-12-04 PROCEDURE — G8419 CALC BMI OUT NRM PARAM NOF/U: HCPCS | Performed by: NURSE PRACTITIONER

## 2020-12-04 PROCEDURE — 99211 OFF/OP EST MAY X REQ PHY/QHP: CPT | Performed by: NURSE PRACTITIONER

## 2020-12-04 NOTE — PROGRESS NOTES
Adriaerico Lombard received a viral test for COVID-19. They were educated on isolation and quarantine as appropriate. For any symptoms, they were directed to seek care from their PCP, given contact information to establish with a doctor, directed to an urgent care or the emergency room.

## 2020-12-04 NOTE — PATIENT INSTRUCTIONS

## 2020-12-05 LAB — SARS-COV-2: NOT DETECTED

## 2020-12-09 ENCOUNTER — TELEPHONE (OUTPATIENT)
Dept: CARDIOLOGY CLINIC | Age: 50
End: 2020-12-09

## 2020-12-09 ENCOUNTER — HOSPITAL ENCOUNTER (OUTPATIENT)
Dept: CARDIAC CATH/INVASIVE PROCEDURES | Age: 50
Discharge: HOME | End: 2020-12-09
Attending: INTERNAL MEDICINE | Admitting: INTERNAL MEDICINE
Payer: MEDICAID

## 2020-12-09 LAB
ABO/RH: NORMAL
ANION GAP SERPL CALCULATED.3IONS-SCNC: 8 MMOL/L (ref 3–16)
ANTIBODY SCREEN: NORMAL
BUN BLDV-MCNC: 15 MG/DL (ref 7–20)
CALCIUM SERPL-MCNC: 9.6 MG/DL (ref 8.3–10.6)
CHLORIDE BLD-SCNC: 101 MMOL/L (ref 99–110)
CO2: 27 MMOL/L (ref 21–32)
CREAT SERPL-MCNC: 0.6 MG/DL (ref 0.9–1.3)
EKG ATRIAL RATE: 70 BPM
EKG DIAGNOSIS: NORMAL
EKG P AXIS: 10 DEGREES
EKG P-R INTERVAL: 216 MS
EKG Q-T INTERVAL: 474 MS
EKG QRS DURATION: 164 MS
EKG QTC CALCULATION (BAZETT): 511 MS
EKG R AXIS: -74 DEGREES
EKG T AXIS: 26 DEGREES
EKG VENTRICULAR RATE: 70 BPM
GFR AFRICAN AMERICAN: >60
GFR NON-AFRICAN AMERICAN: >60
GLUCOSE BLD-MCNC: 106 MG/DL (ref 70–99)
HCT VFR BLD CALC: 48.4 % (ref 40.5–52.5)
HEMOGLOBIN: 16.8 G/DL (ref 13.5–17.5)
INR BLD: 3.26 (ref 0.86–1.14)
MCH RBC QN AUTO: 32.5 PG (ref 26–34)
MCHC RBC AUTO-ENTMCNC: 34.7 G/DL (ref 31–36)
MCV RBC AUTO: 93.6 FL (ref 80–100)
PDW BLD-RTO: 12.2 % (ref 12.4–15.4)
PLATELET # BLD: 198 K/UL (ref 135–450)
PMV BLD AUTO: 10.3 FL (ref 5–10.5)
POTASSIUM REFLEX MAGNESIUM: 4.5 MMOL/L (ref 3.5–5.1)
PROTHROMBIN TIME: 38.3 SEC (ref 10–13.2)
RBC # BLD: 5.17 M/UL (ref 4.2–5.9)
SODIUM BLD-SCNC: 136 MMOL/L (ref 136–145)
WBC # BLD: 11.4 K/UL (ref 4–11)

## 2020-12-09 PROCEDURE — 80048 BASIC METABOLIC PNL TOTAL CA: CPT

## 2020-12-09 PROCEDURE — 86901 BLOOD TYPING SEROLOGIC RH(D): CPT

## 2020-12-09 PROCEDURE — 93005 ELECTROCARDIOGRAM TRACING: CPT

## 2020-12-09 PROCEDURE — 86850 RBC ANTIBODY SCREEN: CPT

## 2020-12-09 PROCEDURE — 86900 BLOOD TYPING SEROLOGIC ABO: CPT

## 2020-12-09 PROCEDURE — 85610 PROTHROMBIN TIME: CPT

## 2020-12-09 PROCEDURE — 85027 COMPLETE CBC AUTOMATED: CPT

## 2020-12-09 NOTE — TELEPHONE ENCOUNTER
pts ICD implant was canceled for today. INR was too high. Needs to be rescheduled for next week. Please call pt to set up.

## 2020-12-10 NOTE — TELEPHONE ENCOUNTER
Patient rescheduled for 12/18/20 @ 12pm, 10:30am arrival to the Cath Lab. Does he need retested for COVID since it will be outside of the time frame? Patient is asymptomatic and was tested 12/4. Please advise.

## 2020-12-14 ENCOUNTER — OFFICE VISIT (OUTPATIENT)
Dept: PRIMARY CARE CLINIC | Age: 50
End: 2020-12-14
Payer: MEDICAID

## 2020-12-14 ENCOUNTER — ANTI-COAG VISIT (OUTPATIENT)
Dept: PHARMACY | Age: 50
End: 2020-12-14
Payer: MEDICAID

## 2020-12-14 LAB — INTERNATIONAL NORMALIZATION RATIO, POC: 1.2

## 2020-12-14 PROCEDURE — 85610 PROTHROMBIN TIME: CPT | Performed by: PHARMACIST

## 2020-12-14 PROCEDURE — G8428 CUR MEDS NOT DOCUMENT: HCPCS | Performed by: NURSE PRACTITIONER

## 2020-12-14 PROCEDURE — G8419 CALC BMI OUT NRM PARAM NOF/U: HCPCS | Performed by: NURSE PRACTITIONER

## 2020-12-14 PROCEDURE — 99211 OFF/OP EST MAY X REQ PHY/QHP: CPT | Performed by: PHARMACIST

## 2020-12-14 PROCEDURE — 99211 OFF/OP EST MAY X REQ PHY/QHP: CPT | Performed by: NURSE PRACTITIONER

## 2020-12-14 NOTE — PROGRESS NOTES
Malgorzata Harper received a viral test for COVID-19. They were educated on isolation and quarantine as appropriate. For any symptoms, they were directed to seek care from their PCP, given contact information to establish with a doctor, directed to an urgent care or the emergency room.

## 2020-12-14 NOTE — PATIENT INSTRUCTIONS

## 2020-12-16 LAB — SARS-COV-2, NAA: NOT DETECTED

## 2020-12-18 ENCOUNTER — HOSPITAL ENCOUNTER (OUTPATIENT)
Dept: CARDIAC CATH/INVASIVE PROCEDURES | Age: 50
Setting detail: OBSERVATION
Discharge: HOME | End: 2020-12-19
Attending: INTERNAL MEDICINE | Admitting: INTERNAL MEDICINE
Payer: MEDICAID

## 2020-12-18 ENCOUNTER — ANESTHESIA (OUTPATIENT)
Dept: CARDIAC CATH/INVASIVE PROCEDURES | Age: 50
End: 2020-12-18
Payer: MEDICAID

## 2020-12-18 ENCOUNTER — ANESTHESIA EVENT (OUTPATIENT)
Dept: CARDIAC CATH/INVASIVE PROCEDURES | Age: 50
End: 2020-12-18
Payer: MEDICAID

## 2020-12-18 VITALS
OXYGEN SATURATION: 93 % | RESPIRATION RATE: 2 BRPM | DIASTOLIC BLOOD PRESSURE: 65 MMHG | SYSTOLIC BLOOD PRESSURE: 104 MMHG

## 2020-12-18 PROBLEM — Z95.810 PRESENCE OF CARDIAC RESYNCHRONIZATION THERAPY DEFIBRILLATOR (CRT-D): Status: ACTIVE | Noted: 2020-12-18

## 2020-12-18 LAB
ANION GAP SERPL CALCULATED.3IONS-SCNC: 9 MMOL/L (ref 3–16)
BUN BLDV-MCNC: 8 MG/DL (ref 7–20)
CALCIUM SERPL-MCNC: 9.9 MG/DL (ref 8.3–10.6)
CHLORIDE BLD-SCNC: 98 MMOL/L (ref 99–110)
CO2: 26 MMOL/L (ref 21–32)
CREAT SERPL-MCNC: 0.7 MG/DL (ref 0.9–1.3)
GFR AFRICAN AMERICAN: >60
GFR NON-AFRICAN AMERICAN: >60
GLUCOSE BLD-MCNC: 105 MG/DL (ref 70–99)
HCT VFR BLD CALC: 47.7 % (ref 40.5–52.5)
HEMOGLOBIN: 16.5 G/DL (ref 13.5–17.5)
INR BLD: 1.32 (ref 0.86–1.14)
MCH RBC QN AUTO: 32.7 PG (ref 26–34)
MCHC RBC AUTO-ENTMCNC: 34.5 G/DL (ref 31–36)
MCV RBC AUTO: 94.6 FL (ref 80–100)
PDW BLD-RTO: 12.5 % (ref 12.4–15.4)
PLATELET # BLD: 200 K/UL (ref 135–450)
PMV BLD AUTO: 9.4 FL (ref 5–10.5)
POTASSIUM REFLEX MAGNESIUM: 4.3 MMOL/L (ref 3.5–5.1)
PROTHROMBIN TIME: 15.3 SEC (ref 10–13.2)
RBC # BLD: 5.05 M/UL (ref 4.2–5.9)
SODIUM BLD-SCNC: 133 MMOL/L (ref 136–145)
WBC # BLD: 8.2 K/UL (ref 4–11)

## 2020-12-18 PROCEDURE — 3700000001 HC ADD 15 MINUTES (ANESTHESIA)

## 2020-12-18 PROCEDURE — C1777 LEAD, AICD, ENDO SINGLE COIL: HCPCS

## 2020-12-18 PROCEDURE — C1769 GUIDE WIRE: HCPCS

## 2020-12-18 PROCEDURE — 6370000000 HC RX 637 (ALT 250 FOR IP): Performed by: INTERNAL MEDICINE

## 2020-12-18 PROCEDURE — G0378 HOSPITAL OBSERVATION PER HR: HCPCS

## 2020-12-18 PROCEDURE — 7100000000 HC PACU RECOVERY - FIRST 15 MIN

## 2020-12-18 PROCEDURE — 33225 L VENTRIC PACING LEAD ADD-ON: CPT | Performed by: INTERNAL MEDICINE

## 2020-12-18 PROCEDURE — 33249 INSJ/RPLCMT DEFIB W/LEAD(S): CPT

## 2020-12-18 PROCEDURE — 33249 INSJ/RPLCMT DEFIB W/LEAD(S): CPT | Performed by: INTERNAL MEDICINE

## 2020-12-18 PROCEDURE — C1893 INTRO/SHEATH, FIXED,NON-PEEL: HCPCS

## 2020-12-18 PROCEDURE — 33225 L VENTRIC PACING LEAD ADD-ON: CPT

## 2020-12-18 PROCEDURE — 2500000003 HC RX 250 WO HCPCS

## 2020-12-18 PROCEDURE — 80048 BASIC METABOLIC PNL TOTAL CA: CPT

## 2020-12-18 PROCEDURE — C1898 LEAD, PMKR, OTHER THAN TRANS: HCPCS

## 2020-12-18 PROCEDURE — 2500000003 HC RX 250 WO HCPCS: Performed by: NURSE ANESTHETIST, CERTIFIED REGISTERED

## 2020-12-18 PROCEDURE — C1900 LEAD, CORONARY VENOUS: HCPCS

## 2020-12-18 PROCEDURE — C1882 AICD, OTHER THAN SING/DUAL: HCPCS | Performed by: INTERNAL MEDICINE

## 2020-12-18 PROCEDURE — C1887 CATHETER, GUIDING: HCPCS

## 2020-12-18 PROCEDURE — 2580000003 HC RX 258: Performed by: NURSE ANESTHETIST, CERTIFIED REGISTERED

## 2020-12-18 PROCEDURE — 2580000003 HC RX 258: Performed by: INTERNAL MEDICINE

## 2020-12-18 PROCEDURE — 6360000002 HC RX W HCPCS: Performed by: NURSE ANESTHETIST, CERTIFIED REGISTERED

## 2020-12-18 PROCEDURE — 6360000002 HC RX W HCPCS

## 2020-12-18 PROCEDURE — 85027 COMPLETE CBC AUTOMATED: CPT

## 2020-12-18 PROCEDURE — 85610 PROTHROMBIN TIME: CPT

## 2020-12-18 PROCEDURE — 6360000002 HC RX W HCPCS: Performed by: INTERNAL MEDICINE

## 2020-12-18 PROCEDURE — 2709999900 HC NON-CHARGEABLE SUPPLY

## 2020-12-18 PROCEDURE — 3700000000 HC ANESTHESIA ATTENDED CARE

## 2020-12-18 RX ORDER — CARVEDILOL 6.25 MG/1
6.25 TABLET ORAL 2 TIMES DAILY WITH MEALS
Status: DISCONTINUED | OUTPATIENT
Start: 2020-12-18 | End: 2020-12-19 | Stop reason: HOSPADM

## 2020-12-18 RX ORDER — PROPOFOL 10 MG/ML
INJECTION, EMULSION INTRAVENOUS CONTINUOUS PRN
Status: DISCONTINUED | OUTPATIENT
Start: 2020-12-18 | End: 2020-12-18

## 2020-12-18 RX ORDER — PROPOFOL 10 MG/ML
INJECTION, EMULSION INTRAVENOUS PRN
Status: DISCONTINUED | OUTPATIENT
Start: 2020-12-18 | End: 2020-12-18 | Stop reason: SDUPTHER

## 2020-12-18 RX ORDER — TRAMADOL HYDROCHLORIDE 50 MG/1
100 TABLET ORAL EVERY 6 HOURS PRN
Status: DISCONTINUED | OUTPATIENT
Start: 2020-12-18 | End: 2020-12-19 | Stop reason: HOSPADM

## 2020-12-18 RX ORDER — WARFARIN SODIUM 2.5 MG/1
2.5 TABLET ORAL DAILY
Status: DISCONTINUED | OUTPATIENT
Start: 2020-12-18 | End: 2020-12-19 | Stop reason: HOSPADM

## 2020-12-18 RX ORDER — ATORVASTATIN CALCIUM 40 MG/1
40 TABLET, FILM COATED ORAL NIGHTLY
Status: DISCONTINUED | OUTPATIENT
Start: 2020-12-18 | End: 2020-12-19 | Stop reason: HOSPADM

## 2020-12-18 RX ORDER — EPHEDRINE SULFATE 50 MG/ML
INJECTION, SOLUTION INTRAVENOUS PRN
Status: DISCONTINUED | OUTPATIENT
Start: 2020-12-18 | End: 2020-12-18 | Stop reason: SDUPTHER

## 2020-12-18 RX ORDER — CEPHALEXIN 250 MG/1
500 CAPSULE ORAL EVERY 12 HOURS SCHEDULED
Status: DISCONTINUED | OUTPATIENT
Start: 2020-12-18 | End: 2020-12-19 | Stop reason: HOSPADM

## 2020-12-18 RX ORDER — HYDROMORPHONE HCL 110MG/55ML
1 PATIENT CONTROLLED ANALGESIA SYRINGE INTRAVENOUS EVERY 4 HOURS PRN
Status: DISCONTINUED | OUTPATIENT
Start: 2020-12-18 | End: 2020-12-19 | Stop reason: HOSPADM

## 2020-12-18 RX ORDER — FUROSEMIDE 40 MG/1
40 TABLET ORAL DAILY
Status: DISCONTINUED | OUTPATIENT
Start: 2020-12-18 | End: 2020-12-19 | Stop reason: HOSPADM

## 2020-12-18 RX ORDER — MIDAZOLAM HYDROCHLORIDE 5 MG/ML
INJECTION INTRAMUSCULAR; INTRAVENOUS PRN
Status: DISCONTINUED | OUTPATIENT
Start: 2020-12-18 | End: 2020-12-18 | Stop reason: SDUPTHER

## 2020-12-18 RX ORDER — OXYCODONE HYDROCHLORIDE AND ACETAMINOPHEN 5; 325 MG/1; MG/1
1 TABLET ORAL EVERY 4 HOURS PRN
Status: DISCONTINUED | OUTPATIENT
Start: 2020-12-18 | End: 2020-12-19 | Stop reason: HOSPADM

## 2020-12-18 RX ORDER — PROPOFOL 10 MG/ML
INJECTION, EMULSION INTRAVENOUS CONTINUOUS PRN
Status: DISCONTINUED | OUTPATIENT
Start: 2020-12-18 | End: 2020-12-18 | Stop reason: SDUPTHER

## 2020-12-18 RX ORDER — SODIUM CHLORIDE 0.9 % (FLUSH) 0.9 %
10 SYRINGE (ML) INJECTION PRN
Status: DISCONTINUED | OUTPATIENT
Start: 2020-12-18 | End: 2020-12-19 | Stop reason: HOSPADM

## 2020-12-18 RX ORDER — PROPOFOL 10 MG/ML
INJECTION, EMULSION INTRAVENOUS PRN
Status: DISCONTINUED | OUTPATIENT
Start: 2020-12-18 | End: 2020-12-18

## 2020-12-18 RX ORDER — DEXAMETHASONE SODIUM PHOSPHATE 4 MG/ML
INJECTION, SOLUTION INTRA-ARTICULAR; INTRALESIONAL; INTRAMUSCULAR; INTRAVENOUS; SOFT TISSUE PRN
Status: DISCONTINUED | OUTPATIENT
Start: 2020-12-18 | End: 2020-12-18 | Stop reason: SDUPTHER

## 2020-12-18 RX ORDER — SPIRONOLACTONE 25 MG/1
25 TABLET ORAL DAILY
Status: DISCONTINUED | OUTPATIENT
Start: 2020-12-18 | End: 2020-12-19 | Stop reason: HOSPADM

## 2020-12-18 RX ORDER — ASPIRIN 81 MG/1
81 TABLET, CHEWABLE ORAL DAILY
Status: DISCONTINUED | OUTPATIENT
Start: 2020-12-19 | End: 2020-12-19 | Stop reason: HOSPADM

## 2020-12-18 RX ORDER — LISINOPRIL 10 MG/1
5 TABLET ORAL DAILY
Status: DISCONTINUED | OUTPATIENT
Start: 2020-12-19 | End: 2020-12-19 | Stop reason: HOSPADM

## 2020-12-18 RX ORDER — LIDOCAINE HYDROCHLORIDE 20 MG/ML
INJECTION, SOLUTION EPIDURAL; INFILTRATION; INTRACAUDAL; PERINEURAL PRN
Status: DISCONTINUED | OUTPATIENT
Start: 2020-12-18 | End: 2020-12-18 | Stop reason: SDUPTHER

## 2020-12-18 RX ORDER — SODIUM CHLORIDE 9 MG/ML
INJECTION, SOLUTION INTRAVENOUS CONTINUOUS PRN
Status: DISCONTINUED | OUTPATIENT
Start: 2020-12-18 | End: 2020-12-18 | Stop reason: SDUPTHER

## 2020-12-18 RX ORDER — SODIUM CHLORIDE 0.9 % (FLUSH) 0.9 %
10 SYRINGE (ML) INJECTION EVERY 12 HOURS SCHEDULED
Status: DISCONTINUED | OUTPATIENT
Start: 2020-12-18 | End: 2020-12-19 | Stop reason: HOSPADM

## 2020-12-18 RX ORDER — POLYETHYLENE GLYCOL 3350 17 G/17G
17 POWDER, FOR SOLUTION ORAL DAILY
Status: DISCONTINUED | OUTPATIENT
Start: 2020-12-18 | End: 2020-12-19 | Stop reason: HOSPADM

## 2020-12-18 RX ORDER — ONDANSETRON 2 MG/ML
4 INJECTION INTRAMUSCULAR; INTRAVENOUS EVERY 6 HOURS PRN
Status: DISCONTINUED | OUTPATIENT
Start: 2020-12-18 | End: 2020-12-19 | Stop reason: HOSPADM

## 2020-12-18 RX ADMIN — SODIUM CHLORIDE: 9 INJECTION, SOLUTION INTRAVENOUS at 12:25

## 2020-12-18 RX ADMIN — EPHEDRINE SULFATE 10 MG: 50 INJECTION INTRAMUSCULAR; INTRAVENOUS; SUBCUTANEOUS at 14:15

## 2020-12-18 RX ADMIN — MIDAZOLAM HYDROCHLORIDE 2 MG: 5 INJECTION, SOLUTION INTRAMUSCULAR; INTRAVENOUS at 13:58

## 2020-12-18 RX ADMIN — MIDAZOLAM HYDROCHLORIDE 2 MG: 5 INJECTION, SOLUTION INTRAMUSCULAR; INTRAVENOUS at 13:26

## 2020-12-18 RX ADMIN — CARVEDILOL 6.25 MG: 6.25 TABLET, FILM COATED ORAL at 22:53

## 2020-12-18 RX ADMIN — CEPHALEXIN 500 MG: 250 CAPSULE ORAL at 22:53

## 2020-12-18 RX ADMIN — DEXAMETHASONE SODIUM PHOSPHATE 8 MG: 4 INJECTION, SOLUTION INTRAMUSCULAR; INTRAVENOUS at 13:26

## 2020-12-18 RX ADMIN — ATORVASTATIN CALCIUM 40 MG: 40 TABLET, FILM COATED ORAL at 22:53

## 2020-12-18 RX ADMIN — PROPOFOL 100 MG: 10 INJECTION, EMULSION INTRAVENOUS at 13:52

## 2020-12-18 RX ADMIN — PROPOFOL 100 MG: 10 INJECTION, EMULSION INTRAVENOUS at 14:07

## 2020-12-18 RX ADMIN — SODIUM CHLORIDE, PRESERVATIVE FREE 10 ML: 5 INJECTION INTRAVENOUS at 23:40

## 2020-12-18 RX ADMIN — TRAMADOL HYDROCHLORIDE 100 MG: 50 TABLET, FILM COATED ORAL at 23:39

## 2020-12-18 RX ADMIN — SODIUM CHLORIDE: 9 INJECTION, SOLUTION INTRAVENOUS at 14:29

## 2020-12-18 RX ADMIN — EPHEDRINE SULFATE 10 MG: 50 INJECTION INTRAMUSCULAR; INTRAVENOUS; SUBCUTANEOUS at 14:24

## 2020-12-18 RX ADMIN — PROPOFOL 50 MCG/KG/MIN: 10 INJECTION, EMULSION INTRAVENOUS at 13:53

## 2020-12-18 RX ADMIN — LIDOCAINE HYDROCHLORIDE 60 MG: 20 INJECTION, SOLUTION EPIDURAL; INFILTRATION; INTRACAUDAL; PERINEURAL at 13:36

## 2020-12-18 RX ADMIN — VANCOMYCIN HYDROCHLORIDE 1500 MG: 1 INJECTION, POWDER, LYOPHILIZED, FOR SOLUTION INTRAVENOUS at 13:20

## 2020-12-18 RX ADMIN — MIDAZOLAM HYDROCHLORIDE 1 MG: 5 INJECTION, SOLUTION INTRAMUSCULAR; INTRAVENOUS at 13:36

## 2020-12-18 ASSESSMENT — PULMONARY FUNCTION TESTS
PIF_VALUE: 0
PIF_VALUE: 0
PIF_VALUE: 11
PIF_VALUE: 0
PIF_VALUE: 3
PIF_VALUE: 0
PIF_VALUE: 1
PIF_VALUE: 3
PIF_VALUE: 11
PIF_VALUE: 11
PIF_VALUE: 9
PIF_VALUE: 6
PIF_VALUE: 0
PIF_VALUE: 3
PIF_VALUE: 12
PIF_VALUE: 3
PIF_VALUE: 8
PIF_VALUE: 11
PIF_VALUE: 0
PIF_VALUE: 11
PIF_VALUE: 0
PIF_VALUE: 12
PIF_VALUE: 3
PIF_VALUE: 0
PIF_VALUE: 12
PIF_VALUE: 0
PIF_VALUE: 9
PIF_VALUE: 11
PIF_VALUE: 3
PIF_VALUE: 11
PIF_VALUE: 11
PIF_VALUE: 12
PIF_VALUE: 6
PIF_VALUE: 0
PIF_VALUE: 1
PIF_VALUE: 3
PIF_VALUE: 3
PIF_VALUE: 11
PIF_VALUE: 11
PIF_VALUE: 0
PIF_VALUE: 0
PIF_VALUE: 12
PIF_VALUE: 10
PIF_VALUE: 12
PIF_VALUE: 11
PIF_VALUE: 12
PIF_VALUE: 3
PIF_VALUE: 12
PIF_VALUE: 3
PIF_VALUE: 0
PIF_VALUE: 12
PIF_VALUE: 11
PIF_VALUE: 12
PIF_VALUE: 0
PIF_VALUE: 0
PIF_VALUE: 3
PIF_VALUE: 0
PIF_VALUE: 11
PIF_VALUE: 11
PIF_VALUE: 0
PIF_VALUE: 11
PIF_VALUE: 3
PIF_VALUE: 0
PIF_VALUE: 12
PIF_VALUE: 11
PIF_VALUE: 0
PIF_VALUE: 0
PIF_VALUE: 11
PIF_VALUE: 0
PIF_VALUE: 0
PIF_VALUE: 2
PIF_VALUE: 12
PIF_VALUE: 0
PIF_VALUE: 11
PIF_VALUE: 12
PIF_VALUE: 11
PIF_VALUE: 11
PIF_VALUE: 0
PIF_VALUE: 0
PIF_VALUE: 12
PIF_VALUE: 10
PIF_VALUE: 3
PIF_VALUE: 7
PIF_VALUE: 0
PIF_VALUE: 2
PIF_VALUE: 0
PIF_VALUE: 0
PIF_VALUE: 2
PIF_VALUE: 12
PIF_VALUE: 0
PIF_VALUE: 0
PIF_VALUE: 2
PIF_VALUE: 11
PIF_VALUE: 0
PIF_VALUE: 11
PIF_VALUE: 12

## 2020-12-18 ASSESSMENT — PAIN SCALES - GENERAL
PAINLEVEL_OUTOF10: 5
PAINLEVEL_OUTOF10: 5

## 2020-12-18 ASSESSMENT — PAIN DESCRIPTION - DESCRIPTORS: DESCRIPTORS: ACHING;DISCOMFORT

## 2020-12-18 ASSESSMENT — PAIN DESCRIPTION - ORIENTATION: ORIENTATION: RIGHT;LEFT

## 2020-12-18 ASSESSMENT — PAIN DESCRIPTION - LOCATION: LOCATION: KNEE;LEG

## 2020-12-18 ASSESSMENT — PAIN DESCRIPTION - PAIN TYPE: TYPE: CHRONIC PAIN

## 2020-12-18 ASSESSMENT — PAIN DESCRIPTION - FREQUENCY: FREQUENCY: CONTINUOUS

## 2020-12-18 ASSESSMENT — ENCOUNTER SYMPTOMS: SHORTNESS OF BREATH: 1

## 2020-12-18 NOTE — H&P
[facility]   Cardiology Admission Note              Date:   12/18/2020  Patient name: Devi Allen  Date of admission:  12/18/2020 10:15 AM  MRN:   [id number]  YOB: 1970    Primary Care physician: María Elena Mack MD    Reason for Admission:  ischemic heart disease    CHIEF COMPLAINT:  Ischemic cardiomyopathy     History Obtained From:  patient    HISTORY OF PRESENT ILLNESS:    Patient is a pleasant 45year-old male with a medical history taken for ischemic cardiomyopathy status post PCI x2 with severely depressed LVEF, hypertension, hyperlipidemia, and left ventricular thrombus who presents from home for BiV ICD implantation. Patient has been doing well outside of some shortness of breath and dyspnea on exertion. Past Medical History:   has a past medical history of CAD (coronary artery disease), Heart attack ([address] and Neck pain. Past Surgical History:   has a past surgical history that includes cervical fusion and Cardiac surgery. Home Medications:    Prior to Admission medications    Medication Sig Start Date End Date Taking?  Authorizing Provider   warfarin (COUMADIN) 5 MG tablet Take 2 to 2.5 tablets by mouth daily as directed by coumadin clinic 11/16/20  Yes Alina Busch MD   spironolactone (ALDACTONE) 25 MG tablet Take 1 tablet by mouth daily 11/9/20  Yes ERVIN Paredes CNP   tiotropium (Darlina Cookie) 18 MCG inhalation capsule Inhale 1 capsule into the lungs daily 8/25/20  Yes Rick Batista MD   carvedilol (COREG) 6.25 MG tablet Take 1 tablet by mouth 2 times daily (with meals) 7/30/20  Yes Alina Busch MD   atorvastatin (LIPITOR) 40 MG tablet Take 1 tablet by mouth nightly 6/23/20  Yes Rick Batista MD   lisinopril (PRINIVIL;ZESTRIL) 5 MG tablet Take 1 tablet by mouth daily 6/18/20  Yes Alina Busch MD   furosemide (LASIX) 40 MG tablet Take 1 tablet by mouth daily 6/18/20  Yes Alina Busch MD   aspirin 81 MG chewable tablet Take 1 tablet by mouth daily 2/15/20  Yes ERVIN Campuzano CNP   traMADol (ULTRAM) 50 MG tablet Take 100 mg by mouth every 8 hours as needed for Pain. Yes Historical Provider, MD       Allergies:  Patient has no known allergies. Social History:   reports that he has been smoking cigarettes. He has a 28.00 pack-year smoking history. He has never used smokeless tobacco. He reports current alcohol use of about 2.0 standard drinks of alcohol per week. He reports that he does not use drugs. Family History: family history includes Diabetes in his mother; Heart Disease in his father and mother. REVIEW OF SYSTEMS:    · Constitutional: there has been no unanticipated weight loss. There's been no change in energy level, sleep pattern, or activity level. · Eyes: No visual changes or diplopia. No scleral icterus. · ENT: No Headaches, hearing loss or vertigo. No mouth sores or sore throat. · Cardiovascular: No chest pain, Yes dyspnea on exertion, No palpitations or No loss of consciousness. No cough, hemoptysis, No pleuritic pain, or phlebitis. · Respiratory: No cough or wheezing, no sputum production. No hematemesis. · Gastrointestinal: No abdominal pain, appetite loss, blood in stools. No change in bowel or bladder habits. · Genitourinary: No dysuria, trouble voiding, or hematuria. · Musculoskeletal:  No gait disturbance, No weakness or joint complaints. · Integumentary: No rash or pruritis. · Neurological: No headache, diplopia, change in muscle strength, numbness or tingling. No change in gait, balance, coordination, mood, affect, memory, mentation, behavior. · Psychiatric: No anxiety, or depression. · Endocrine: No temperature intolerance. No excessive thirst, fluid intake, or urination. No tremor. · Hematologic/Lymphatic: No abnormal bruising or bleeding, blood clots or swollen lymph nodes. · Allergic/Immunologic: No nasal congestion or hives.     PHYSICAL EXAM: Physical Examination:    Vital Signs:           Blood pressure 108/68, pulse 69, resp. rate 23, height 6' 2\" (1.88 m), weight 220 lb (99.8 kg), SpO2 93 %. Pulse  Av.3  Min: 66  Max: 72  BP  Min: 73/46  Max: 109/68  SpO2  Av.8 %  Min: 73 %  Max: 100 %  FiO2   Av.8 %  Min: 21 %  Max: 99 %    Admission Weight:  Weight: 220 lb (99.8 kg)      I/O:     Intake/Output Summary (Last 24 hours) at 2020 1806  Last data filed at 2020 1428  Gross per 24 hour   Intake 1000 ml   Output --   Net 1000 ml          Vent Settings:  Vent Information  SpO2: 93 %       Constitutional and General Appearance: alert, cooperative, no distress and appears stated age  HEENT: PERRL, no cervical lymphadenopathy. No masses palpable. Normal oral mucosa  Respiratory:  · Normal excursion and expansion without use of accessory muscles  · Resp Auscultation: Normal breath sounds without dullness or wheezing  Cardiovascular:  · The apical impulse is not displaced  · RRR S1S2 w/o M/G/R  Abdomen:  · No masses or tenderness  · Bowel sounds present  Extremities:  ·  No Cyanosis or Clubbing  ·  Lower extremity edema: No  ·  Skin: Warm and dry  Neurological:  · Alert and oriented. · Moves all extremities well  · No abnormalities of mood, affect, memory, mentation, or behavior are noted    DATA:    CARDIOLOGY LABS:   CBC:   Recent Labs     20  1200   WBC 8.2   HGB 16.5   HCT 47.7        BMP:   Recent Labs     20  1200   *   K 4.3   CO2 26   BUN 8   CREATININE 0.7*   LABGLOM >60   GLUCOSE 105*     BNP: No results for input(s): BNP in the last 72 hours. PT/INR:   Recent Labs     20  1200   PROTIME 15.3*   INR 1.32*     APTT:No results for input(s): APTT in the last 72 hours. CARDIAC ENZYMES:No results for input(s): CKMB, CKMBINDEX, TROPONINI in the last 72 hours.     Invalid input(s): CKTOTAL;3  FASTING LIPID PANEL:  Lab Results   Component Value Date    HDL 30 2020    [address] 2020 TRIG 189 11/11/2020     LIVER PROFILE:No results for input(s): AST, ALT, ALB in the last 72 hours. IMPRESSION:    Patient Active Problem List   Diagnosis    CHF (congestive heart failure), NYHA class I, acute on chronic, combined (HCC)    [facility] systolic (congestive) [facility] failure [facility]    Coronary artery disease involving native coronary artery of native heart without angina pectoris    Dyspnea on exertion    Elevated brain natriuretic peptide (BNP) level    Acute pulmonary edema (HCC)    Cardiomegaly    Ischemic cardiomyopathy    Left ventricular thrombosis    Hyponatremia    Leukocytosis    Chronic systolic (congestive) heart failure ([facility]       RECOMMENDATIONS:  1. BiV ICD implant    Discussed with patient, wife and nursing. All questions and concerns were addressed to the patient/family. Alternatives to my treatment were discussed. The note was completed using EMR. Every effort was made to ensure accuracy; however, inadvertent computerized transcription errors may be present.     Ciara Cortez MD  Cardiac Electrophysiology  [address] [facility]  [phone] [facility]

## 2020-12-18 NOTE — PROGRESS NOTES
Received report from Brian Wolf CRNA and Cath lab nurse. ICD dressing c,d,&i. Pt alert and oriented x4 and requesting for a drink. Left arm in sling. VSS with atrial paced. Will continue to monitor.

## 2020-12-18 NOTE — ANESTHESIA PRE PROCEDURE
Department of Anesthesiology  Preprocedure Note       Name:  Mike Randall   Age:  45 y.o.  :  1970                                          MRN:  [id number]         Date:  2020      Surgeon: * Surgery not found *    Procedure:     Medications prior to admission:   Prior to Admission medications    Medication Sig Start Date End Date Taking? Authorizing Provider   warfarin (COUMADIN) 5 MG tablet Take 2 to 2.5 tablets by mouth daily as directed by coumadin clinic 20   Elizabeth Slade MD   spironolactone (ALDACTONE) 25 MG tablet Take 1 tablet by mouth daily 20   ERVIN Heller CNP   tiotropium (Nav Minerva) 18 MCG inhalation capsule Inhale 1 capsule into the lungs daily 20   Kody Pena MD   carvedilol (COREG) 6.25 MG tablet Take 1 tablet by mouth 2 times daily (with meals) 20   Elizabeth Slade MD   atorvastatin (LIPITOR) 40 MG tablet Take 1 tablet by mouth nightly 20   Kody Pena MD   lisinopril (PRINIVIL;ZESTRIL) 5 MG tablet Take 1 tablet by mouth daily 20   Elizabeth Slade MD   furosemide (LASIX) 40 MG tablet Take 1 tablet by mouth daily 20   Elizabeth Slade MD   aspirin 81 MG chewable tablet Take 1 tablet by mouth daily 2/15/20   ERVIN Barth CNP   traMADol (ULTRAM) 50 MG tablet Take 100 mg by mouth every 8 hours as needed for Pain. Historical Provider, MD       Current medications:    No current facility-administered medications for this encounter. Allergies:     Allergies   Allergen Reactions    Statins      Muscle weakness       Problem List:    Patient Active Problem List   Diagnosis Code    CHF (congestive heart failure), NYHA class I, acute on chronic, combined (HCC) A81.66    Acute systolic (congestive) heart failure (HCC) I50.21    Coronary artery disease involving native coronary artery of native heart without angina pectoris I25.10    Dyspnea on exertion R06.00    Elevated brain natriuretic peptide (BNP) level R79.89    Acute pulmonary edema (HCC) J81.0    Cardiomegaly I51.7    Ischemic cardiomyopathy I25.5    Left ventricular thrombosis I51.3    Hyponatremia E87.1    Leukocytosis D72.829    Chronic systolic (congestive) heart failure (HCC) I50.22       Past Medical History:        Diagnosis Date    CAD (coronary artery disease)     Heart attack ([address]     Neck pain        Past Surgical History:        Procedure Laterality Date    CARDIAC SURGERY      2012 at [facility] stent placed     CERVICAL FUSION      [address] History:    Social History     Tobacco Use    Smoking status: Current Every Day Smoker     Packs/day: 1.00     Years: 28.00     Pack years: 28.00     Types: Cigarettes    Smokeless tobacco: Never Used    Tobacco comment: 1ppd    Substance Use Topics    Alcohol use: Yes     Alcohol/week: 2.0 standard drinks     Types: 2 Shots of liquor per week                                Ready to quit: Not Answered  Counseling given: Not Answered  Comment: 1ppd       Vital Signs (Current): There were no vitals filed for this visit.                                            BP Readings from Last 3 Encounters:   11/09/20 100/60   10/01/20 112/64   09/30/20 117/85       NPO Status:                                                                                 BMI:   Wt Readings from Last 3 Encounters:   11/09/20 220 lb (99.8 kg)   10/01/20 216 lb (98 kg)   09/30/20 212 lb (96.2 kg)     There is no height or weight on file to calculate BMI.    CBC:   Lab Results   Component Value Date    WBC 11.4 12/09/2020    RBC 5.17 12/09/2020    HGB 16.8 12/09/2020    HCT 48.4 12/09/2020    MCV 93.6 12/09/2020    RDW 12.2 12/09/2020     12/09/2020       CMP:   Lab Results   Component Value Date     12/09/2020    K 4.5 12/09/2020     12/09/2020    CO2 27 12/09/2020    BUN 15 12/09/2020    CREATININE 0.6 12/09/2020    GFRAA >60 12/09/2020    AGRATIO 1.7

## 2020-12-18 NOTE — PROCEDURES
Electrophysiology Procedure Report    Attending MD Hernando Will MD    Procedures Biventricular ICD implant  Left upper extremity venogram  Coronary sinus venogram  Defibrillation threshold testing    Indications Ischemic cardiomyopathy with EF 25-30%, RBBB with QRS duration 164 ms, and NYHA class II CHF    Complication None    EBL < 20 cc    Conclusion   Successful biventricular/internal cardioverter defibrillator implant    Description of Procedure  The patient was brought to the electrophysiology laboratory in the fasting state after informed consent was obtained. The patient was placed in the supine position and the left pectoral area was prepared and draped in a sterile fashion. The electrocardiogram, blood pressure, and oxygenation were monitored intra- and post-procedure. Sedation was administered by the anesthesia department. Intravenous antibiotic was given prior to the procedure for general antibiotic prophylaxis (vancomycin). After using buffered lidocaine 1% with epinephrine (1/100,000) for local anesthesia to the left pectoral subcutaneous area, venous access was obtained in the left axillary vein using the modified Seldinger technique under ultrasound guidance x 3. A 4-5 cm incision was made inferior to the left clavicle. The subcutaneous tissues were dissected to the level of the pre-pectoral fascia, and a pocket was fashioned via blunt dissection. A 9 F sheath was inserted into the left axillary vein over a guidewire. The right ventricular lead was inserted and the lead tip positioned in the RV apex under fluoroscopic guidance. Once appropriate placement was obtained and threshold measurements made to 's specifications, the lead was anchored to the pectoralis fascia using 2-0 silk. A 7 F sheath was then inserted into the left axillary vein over a guidewire. The right atrial lead was inserted and the lead tip positioned in the RA appendage under fluoroscopic guidance. Threshold measurements were obtained to the 's specifications, and the lead was anchored to the pectoralis fascia using 2-0 silk. A short 9F sheath was then inserted into the left axillary vein and the coronary sinus was accessed using a long CS sheath and a Versacore wire. A coronary sinus venogram was performed to identify the target vein in the left ventricular lateral wall, and the left ventricular lead was inserted via the long sheath. Once appropriate placement was obtained and threshold measurements made to the 's specifications, the lead was anchored to the pectoralis fascia using 2-0 silk. After hemostasis was assured, the pulse generator was connected to the atrial and ventricular leads and the appropriate lead pacing/impedance was confirmed. The device was placed into the pocket with care to ensure the leads were positioned behind the device, and the pocket was irrigated with antibiotic solution. The fascia was closed using interrupted vicryl sutures. The subcutaneous tissue and skin were approximated using running 3-0 Vicryl sutures, and the wound was sealed with poly-cyanoacrylate solution. A sterile dressing was applied to the site. The patient tolerated the procedure well and there were no complications. At the conclusion of the procedure, all sponges and sharps were accounted for by two counts. The attending physician, Jimbo Breen was present for the entire procedure and for interpretation of data.     Device and Lead Information  Pulse Generator Model # Manfacturer Serial # Location   [id number] Vaughan [id number] left pectoral, subcutaneous     Lead Model Number Manfacturer Serial Number Lead position   RA [id number] Vaughan [id number] RA appendage   RV [id number] Abbott [id number] RV apex   LV [id number] Abbott [id number] PL branch CS     Lead Sensing and Thresholds  Lead R/P sensing (mV) Threshold (V) Threshold PW (msec) Impedance (?) Final output Voltage (V) Final PW (msec)   RA 2.1 0.5 0.5 460 3.5 0.4   RV 10.3 0.5 0.5 180 3.5 0.4   LV - 0.75 0.5 940 3.5 0.4     Bradycardia Settings  Dontae Mode LRL URL Pace AVD (ms) Sense AVD (ms) Mode Switching Mode SW Rate   DDD 60 120 170 120        Tachycardia Settings  Zone Type ON/OFF/  MONITOR Zone Rate 1st Rx Type Energy (J) 2nd Rx Type Energy (J) 3rd Rx Type Energy (J) 4th Rx Type Energy (J) 5th Rx Type Energy (J) 6th Rx Type Energy (J)   VT1  - - - - - - - - - - - -   VT2  ATP-burst X 3 shock 36 shock 40 shock 40 shock 40 shock 40   VF  ATP X 1 shock 36 shock 40 shock 40 shock 40 shock 40     Contrast  Omnipaque 350  -  10 cc  Fluoro time  7.5 minutes    Proceduralist Notes:  - Low PL was target vessel.  - OS of CS tortuous and made LV lead look overlapped. When pulled back lost some ground in low PL branch. - Needed acute angle subselector to get into PL branch.

## 2020-12-19 ENCOUNTER — APPOINTMENT (OUTPATIENT)
Dept: GENERAL RADIOLOGY | Age: 50
End: 2020-12-19
Attending: INTERNAL MEDICINE
Payer: MEDICAID

## 2020-12-19 ENCOUNTER — PROCEDURE VISIT (OUTPATIENT)
Dept: CARDIOLOGY CLINIC | Age: 50
End: 2020-12-19
Payer: MEDICAID

## 2020-12-19 VITALS
RESPIRATION RATE: 18 BRPM | BODY MASS INDEX: 28.79 KG/M2 | DIASTOLIC BLOOD PRESSURE: 68 MMHG | WEIGHT: 224.3 LBS | TEMPERATURE: 97.8 F | HEART RATE: 78 BPM | SYSTOLIC BLOOD PRESSURE: 114 MMHG | OXYGEN SATURATION: 97 % | HEIGHT: 74 IN

## 2020-12-19 LAB
ANION GAP SERPL CALCULATED.3IONS-SCNC: 11 MMOL/L (ref 3–16)
BUN BLDV-MCNC: 13 MG/DL (ref 7–20)
CALCIUM SERPL-MCNC: 9 MG/DL (ref 8.3–10.6)
CHLORIDE BLD-SCNC: 102 MMOL/L (ref 99–110)
CO2: 23 MMOL/L (ref 21–32)
CREAT SERPL-MCNC: 0.6 MG/DL (ref 0.9–1.3)
EKG ATRIAL RATE: 64 BPM
EKG ATRIAL RATE: 74 BPM
EKG DIAGNOSIS: NORMAL
EKG DIAGNOSIS: NORMAL
EKG P AXIS: 21 DEGREES
EKG P AXIS: 27 DEGREES
EKG P-R INTERVAL: 170 MS
EKG P-R INTERVAL: 236 MS
EKG Q-T INTERVAL: 472 MS
EKG Q-T INTERVAL: 476 MS
EKG QRS DURATION: 152 MS
EKG QRS DURATION: 162 MS
EKG QTC CALCULATION (BAZETT): 491 MS
EKG QTC CALCULATION (BAZETT): 523 MS
EKG R AXIS: -73 DEGREES
EKG R AXIS: 238 DEGREES
EKG T AXIS: 20 DEGREES
EKG T AXIS: 96 DEGREES
EKG VENTRICULAR RATE: 64 BPM
EKG VENTRICULAR RATE: 74 BPM
GFR AFRICAN AMERICAN: >60
GFR NON-AFRICAN AMERICAN: >60
GLUCOSE BLD-MCNC: 148 MG/DL (ref 70–99)
HCT VFR BLD CALC: 42.8 % (ref 40.5–52.5)
HEMOGLOBIN: 14.6 G/DL (ref 13.5–17.5)
INR BLD: 1.3 (ref 0.86–1.14)
MCH RBC QN AUTO: 32.7 PG (ref 26–34)
MCHC RBC AUTO-ENTMCNC: 34 G/DL (ref 31–36)
MCV RBC AUTO: 96.1 FL (ref 80–100)
PDW BLD-RTO: 12.2 % (ref 12.4–15.4)
PLATELET # BLD: 158 K/UL (ref 135–450)
PMV BLD AUTO: 10 FL (ref 5–10.5)
POTASSIUM SERPL-SCNC: 4 MMOL/L (ref 3.5–5.1)
PROTHROMBIN TIME: 15.1 SEC (ref 10–13.2)
RBC # BLD: 4.45 M/UL (ref 4.2–5.9)
SODIUM BLD-SCNC: 136 MMOL/L (ref 136–145)
WBC # BLD: 13 K/UL (ref 4–11)

## 2020-12-19 PROCEDURE — 93284 PRGRMG EVAL IMPLANTABLE DFB: CPT | Performed by: INTERNAL MEDICINE

## 2020-12-19 PROCEDURE — 93005 ELECTROCARDIOGRAM TRACING: CPT | Performed by: INTERNAL MEDICINE

## 2020-12-19 PROCEDURE — G0378 HOSPITAL OBSERVATION PER HR: HCPCS

## 2020-12-19 PROCEDURE — 6370000000 HC RX 637 (ALT 250 FOR IP): Performed by: INTERNAL MEDICINE

## 2020-12-19 PROCEDURE — 71046 X-RAY EXAM CHEST 2 VIEWS: CPT

## 2020-12-19 PROCEDURE — 85027 COMPLETE CBC AUTOMATED: CPT

## 2020-12-19 PROCEDURE — 85610 PROTHROMBIN TIME: CPT

## 2020-12-19 PROCEDURE — 94640 AIRWAY INHALATION TREATMENT: CPT

## 2020-12-19 PROCEDURE — 99024 POSTOP FOLLOW-UP VISIT: CPT | Performed by: INTERNAL MEDICINE

## 2020-12-19 PROCEDURE — 2580000003 HC RX 258: Performed by: INTERNAL MEDICINE

## 2020-12-19 PROCEDURE — 36415 COLL VENOUS BLD VENIPUNCTURE: CPT

## 2020-12-19 PROCEDURE — 80048 BASIC METABOLIC PNL TOTAL CA: CPT

## 2020-12-19 RX ORDER — OXYCODONE HYDROCHLORIDE AND ACETAMINOPHEN 5; 325 MG/1; MG/1
1 TABLET ORAL EVERY 4 HOURS PRN
Qty: 12 TABLET | Refills: 0 | Status: SHIPPED | OUTPATIENT
Start: 2020-12-19 | End: 2020-12-22

## 2020-12-19 RX ORDER — CEPHALEXIN 500 MG/1
500 CAPSULE ORAL EVERY 12 HOURS SCHEDULED
Qty: 10 CAPSULE | Refills: 0 | Status: SHIPPED | OUTPATIENT
Start: 2020-12-19 | End: 2020-12-24

## 2020-12-19 RX ADMIN — OXYCODONE HYDROCHLORIDE AND ACETAMINOPHEN 1 TABLET: 5; 325 TABLET ORAL at 13:25

## 2020-12-19 RX ADMIN — SPIRONOLACTONE 25 MG: 25 TABLET ORAL at 09:35

## 2020-12-19 RX ADMIN — LISINOPRIL 5 MG: 10 TABLET ORAL at 09:36

## 2020-12-19 RX ADMIN — OXYCODONE HYDROCHLORIDE AND ACETAMINOPHEN 1 TABLET: 5; 325 TABLET ORAL at 09:35

## 2020-12-19 RX ADMIN — OXYCODONE HYDROCHLORIDE AND ACETAMINOPHEN 1 TABLET: 5; 325 TABLET ORAL at 05:23

## 2020-12-19 RX ADMIN — ASPIRIN 81 MG CHEWABLE TABLET 81 MG: 81 TABLET CHEWABLE at 09:35

## 2020-12-19 RX ADMIN — TIOTROPIUM BROMIDE INHALATION SPRAY 2 PUFF: 3.12 SPRAY, METERED RESPIRATORY (INHALATION) at 09:47

## 2020-12-19 RX ADMIN — SODIUM CHLORIDE, PRESERVATIVE FREE 10 ML: 5 INJECTION INTRAVENOUS at 09:37

## 2020-12-19 RX ADMIN — FUROSEMIDE 40 MG: 40 TABLET ORAL at 09:36

## 2020-12-19 RX ADMIN — CEPHALEXIN 500 MG: 250 CAPSULE ORAL at 09:35

## 2020-12-19 RX ADMIN — CARVEDILOL 6.25 MG: 6.25 TABLET, FILM COATED ORAL at 09:37

## 2020-12-19 ASSESSMENT — PAIN DESCRIPTION - LOCATION
LOCATION: ARM
LOCATION: CHEST

## 2020-12-19 ASSESSMENT — PAIN DESCRIPTION - FREQUENCY
FREQUENCY: CONTINUOUS
FREQUENCY: CONTINUOUS

## 2020-12-19 ASSESSMENT — PAIN DESCRIPTION - ORIENTATION
ORIENTATION: LEFT
ORIENTATION: LEFT

## 2020-12-19 ASSESSMENT — PAIN DESCRIPTION - PAIN TYPE
TYPE: SURGICAL PAIN
TYPE: ACUTE PAIN;SURGICAL PAIN

## 2020-12-19 ASSESSMENT — PAIN SCALES - GENERAL
PAINLEVEL_OUTOF10: 5
PAINLEVEL_OUTOF10: 4
PAINLEVEL_OUTOF10: 6
PAINLEVEL_OUTOF10: 3
PAINLEVEL_OUTOF10: 6

## 2020-12-19 ASSESSMENT — PAIN DESCRIPTION - DESCRIPTORS
DESCRIPTORS: ACHING
DESCRIPTORS: ACHING;STABBING

## 2020-12-19 NOTE — PROGRESS NOTES
Assessment completed, see doc flow sheet, in bed resting, call light within reach, without distress, vital signs stable, left pacer site unremarkable, without bleeding or hematoma, pulses palpable, denies needs at present time, will continue to monitor and treat. Pravin Mckeon

## 2020-12-19 NOTE — FLOWSHEET NOTE
12/18/20 2320   Assessment   Charting Type Shift assessment   Neurological   Neuro (WDL) WDL   Swallow Screening   Is the patient able to remain alert for testing? Yes   [place] Coma Scale   Eye Opening 4   Best Verbal Response 5   Best Motor Response 6   Jennifer Coma Scale Score 15   HEENT   HEENT (WDL) WDL   Respiratory   Respiratory Pattern Regular   Respiratory Depth Normal   Respiratory Quality/Effort Unlabored   Chest Assessment Chest expansion symmetrical   Breath Sounds   Right Upper Lobe Clear   Right Middle Lobe Clear   Right Lower Lobe Clear   Left Upper Lobe Clear   Left Lower Lobe Clear   Cardiac   Cardiac Regularity Regular   Heart Sounds S1, S2   Cardiac Rhythm Atrial Paced   Gastrointestinal   Abdominal (WDL) WDL   Peripheral Vascular   Peripheral Vascular (WDL) WDL   Edema None   Skin Color/Condition   Skin Color/Condition (WDL) WDL   Skin Integrity   Skin Integrity Bruising   Location Left upper chest  (s/p pacemaker placement)   Musculoskeletal   RUE Full movement   LUE Limited movement  (S/p pacemaker placement)   RL Extremity Full movement   LL Extremity Full movement   Genitourinary   Genitourinary (WDL) WDL   Flank Tenderness No   Suprapubic Tenderness No   Dysuria No   Anus/Rectum   Anus/Rectum (WDL) WDL   Psychosocial   Psychosocial (WDL) WDL   Pt alert and oriented , updated with POC, Pt verbalized understanding, s/p pacemaker placement, left upper chest bruised, dressing CDI, Pt instructed no lifting, no over reaching on Left arm,  Arm sling was not on, Pt stated \" removed downstairs\" Pt verbalized understanding on indication of use, Pt agrees to place it on before sleeping. Pt c/o \" Leg pain rated 4/10 and increasing and annoying\" Repositioned in bed for comfort. Will continue to monitor.  MKRN

## 2020-12-19 NOTE — DISCHARGE SUMMARY
Patient ID:  Minh Shirley  [id number]  89 y.o.  1970    Admit date: 12/18/2020    Discharge date and time: 12/19/2020    Admitting Physician: Manuel Ward MD     Discharge NP: Sulma Huang MD    Admission Diagnoses: Ischemic cardiomyopathy [I25.5]  Ischemic cardiomyopathy [I25.5]    Discharge Diagnoses: SAME    Admission Condition: fair    Discharged Condition: good    Hospital Course:  Minh Shirley was admitted on 12/18/2020 and had BiV ICD implanted with [employer]. Device check was normal and CXR was normal from a device standpoint. Rhythm has been NSR with BiV pacing. Consults:  None. Assessment:   Patient underwent successful biventricular ICD implantation. No complications. Wound appears clean/dry/intact. Pain is controlled. -s/p BiV ICD implant on 12/18/2020    -device check and CXR reviewed by Dr. Elijio Claude   -    Plan:   Continue keflex 500 mg BID. PRN percocet x 10 pills. Post procedure instructions reviewed   Device check on 12/19/2020  Follow up in office on 1 week. Discharge Exam:  /78   Pulse 78   Temp 97.8 °F (36.6 °C) (Oral)   Resp 18   Ht 6' 2\" (1.88 m)   Wt 224 lb 4.8 oz (101.7 kg)   SpO2 98%   BMI 28.80 kg/m²     General Appearance:    Alert, cooperative, no distress, appears stated age   Head:    Normocephalic, without obvious abnormality, atraumatic   Eyes:    PERRL, conjunctiva/corneas clear      Ears:    deferred           Neck:   Supple, symmetrical, trachea midline, no adenopathy;        thyroid:  No enlargement/tenderness/nodules; no carotid    bruit or JVD   Back:     Symmetric, no curvature, ROM normal, no CVA tenderness   Lungs:     Clear to auscultation bilaterally, respirations unlabored   Chest wall:    No tenderness or deformity; left upper chest dressing dry and intact, no hematoma, left shoulder soft with mild bruising.    Heart:    Regular rate and rhythm, S1 and S2 normal, no murmur, rub   or gallop   Abdomen:     Soft, non-tender, bowel sounds active all four quadrants,     no masses, no organomegaly   Genitalia:    deferred   Rectal:    deferred    Extremities:   Extremities normal, atraumatic, no cyanosis or edema   Pulses:   2+ and symmetric all extremities   Skin:   Skin color, texture, turgor normal, no rashes or lesions       Neurologic:   CNII-XII intact. Normal strength, sensation and reflexes       throughout     Disposition: home    Patient Instructions:   Current Discharge Medication List      START taking these medications    Details   oxyCODONE-acetaminophen (PERCOCET) 5-325 MG per tablet Take 1 tablet by mouth every 4 hours as needed for Pain for up to 3 days. Qty: 12 tablet, Refills: 0    Comments: Reduce doses taken as pain becomes manageable  Associated Diagnoses: Cardiomegaly      cephALEXin (KEFLEX) 500 MG capsule Take 1 capsule by mouth every 12 hours for 5 days  Qty: 10 capsule, Refills: 0         CONTINUE these medications which have NOT CHANGED    Details   warfarin (COUMADIN) 5 MG tablet Take 2 to 2.5 tablets by mouth daily as directed by coumadin clinic  Qty: 75 tablet, Refills: 3      spironolactone (ALDACTONE) 25 MG tablet Take 1 tablet by mouth daily  Qty: 30 tablet, Refills: 5      tiotropium (SPIRIVA HANDIHALER) 18 MCG inhalation capsule Inhale 1 capsule into the lungs daily  Qty: 30 capsule, Refills: 5      carvedilol (COREG) 6.25 MG tablet Take 1 tablet by mouth 2 times daily (with meals)  Qty: 180 tablet, Refills: 3      atorvastatin (LIPITOR) 40 MG tablet Take 1 tablet by mouth nightly  Qty: 30 tablet, Refills: 5      lisinopril (PRINIVIL;ZESTRIL) 5 MG tablet Take 1 tablet by mouth daily  Qty: 30 tablet, Refills: 5      furosemide (LASIX) 40 MG tablet Take 1 tablet by mouth daily  Qty: 60 tablet, Refills: 5      aspirin 81 MG chewable tablet Take 1 tablet by mouth daily  Qty: 30 tablet, Refills: 3      traMADol (ULTRAM) 50 MG tablet Take 100 mg by mouth every 8 hours as needed for Pain. Post device instructions given  Activity: as tolerated  Diet: cardiac diet    Carmine De MD  Cardiac Electrophysiology  [facility]  [phone]

## 2020-12-19 NOTE — CARE COORDINATION
Pt has PCP ([facility]) and insurance. No PT/OT eval to review and no sig CM hx. Please call [facility] [phone] if needs arise.

## 2020-12-19 NOTE — PROGRESS NOTES
Pt pleasant, No c/o pain at this time. Left upper chest s/p pacemaker site dressing CDI, Left arm sling in placed. SR on tele HR=79/min on tele. Pt denies any needs at this time.  VIKASN

## 2020-12-19 NOTE — PROGRESS NOTES
Patient discharged home, instructions reviewed and patient verbalizes understanding. Lauren Troncoso

## 2020-12-20 NOTE — ANESTHESIA POSTPROCEDURE EVALUATION
Department of Anesthesiology  Postprocedure Note    Patient: India Anaya  MRN: [id number]  YOB: 1970  Date of evaluation: 12/20/2020  Time:  8:31 AM     Procedure Summary     Date: 12/18/20 Room / Location: [facility] Cardiac Cath Lab    Anesthesia Start: 1321 Anesthesia Stop: 5468    Procedure: ICD Diagnosis:       Ischemic cardiomyopathy      Ischemic cardiomyopathy      Ischemic cardiomyopathy    Scheduled Providers:  Responsible Provider: Aurora Mairn MD    Anesthesia Type: MAC ASA Status: 3          Anesthesia Type: MAC    Clovis Phase I: Clovis Score: 10    Clovis Phase II:      Last vitals: Reviewed and per EMR flowsheets.        Anesthesia Post Evaluation    Patient location during evaluation: PACU  Patient participation: complete - patient participated  Level of consciousness: awake and alert  Pain score: 0  Airway patency: patent  Nausea & Vomiting: no nausea and no vomiting  Complications: no  Cardiovascular status: blood pressure returned to baseline  Respiratory status: acceptable  Hydration status: stable

## 2020-12-20 NOTE — PROGRESS NOTES
crt-d implanted 12/18/20. Post op check 12/19. Device interrogation by company representative. Device functioning as programmed. See PACEART report under Cardiology tab.

## 2020-12-23 ENCOUNTER — NURSE ONLY (OUTPATIENT)
Dept: CARDIOLOGY CLINIC | Age: 50
End: 2020-12-23
Payer: MEDICAID

## 2020-12-23 PROCEDURE — 93284 PRGRMG EVAL IMPLANTABLE DFB: CPT | Performed by: INTERNAL MEDICINE

## 2020-12-23 NOTE — PROGRESS NOTES
Patient presents to the device clinic today for a programming evaluation for his defibrillator. Patient has a history of ICM. Takes Coreg and Coumadin. Patient's device was implanted on 12/18 by Marialuisa Tolbert. Since then, no arrhythmias recorded. AP 8.3%  >99%  All sensing and pacing parameters are within normal range. No changes need to be made at this time. Incision is clean and dry with all dressings removed and site left open to the air. Patient education was provided about site care, device functionality, in home monitoring, and any other patient questions and/or concerns were addressed. Aftercare and remote monitoring literature was provided. Patient voices understanding. Please see interrogation for more detail. Patient will follow up in 3 months in office or remotely. See Paceart report under the Cardiology tab.

## 2021-01-04 RX ORDER — LISINOPRIL 5 MG/1
TABLET ORAL
Qty: 30 TABLET | Refills: 5 | Status: SHIPPED | OUTPATIENT
Start: 2021-01-04 | End: 2021-07-15 | Stop reason: ALTCHOICE

## 2021-01-21 ENCOUNTER — NURSE ONLY (OUTPATIENT)
Dept: CARDIOLOGY CLINIC | Age: 51
End: 2021-01-21

## 2021-01-21 DIAGNOSIS — Z95.810 PRESENCE OF CARDIAC RESYNCHRONIZATION THERAPY DEFIBRILLATOR (CRT-D): ICD-10-CM

## 2021-03-29 ASSESSMENT — ENCOUNTER SYMPTOMS
RESPIRATORY NEGATIVE: 1
GASTROINTESTINAL NEGATIVE: 1

## 2021-03-29 NOTE — PROGRESS NOTES
(Cyndy Opitz) 18 MCG inhalation capsule Inhale 1 capsule into the lungs daily 8/25/20   Emmanuel Murphy MD   carvedilol (COREG) 6.25 MG tablet Take 1 tablet by mouth 2 times daily (with meals) 7/30/20   Roni Esqueda MD   atorvastatin (LIPITOR) 40 MG tablet Take 1 tablet by mouth nightly 6/23/20   Emmanuel Murphy MD   furosemide (LASIX) 40 MG tablet Take 1 tablet by mouth daily 6/18/20   Roni Esqueda MD   aspirin 81 MG chewable tablet Take 1 tablet by mouth daily 2/15/20   ERVIN Garcia - CNP   traMADol (ULTRAM) 50 MG tablet Take 100 mg by mouth every 8 hours as needed for Pain. Historical Provider, MD     Allergies:  Patient has no known allergies. Social History:   reports that he has been smoking cigarettes. He has a 28.00 pack-year smoking history. He has never used smokeless tobacco. He reports current alcohol use of about 2.0 standard drinks of alcohol per week. He reports that he does not use drugs. Family History: family history includes Diabetes in his mother; Heart Disease in his father and mother. Review of Systems   Review of Systems   Constitutional: Negative. Respiratory: Negative. Cardiovascular: Negative. Gastrointestinal: Negative. Neurological: Negative.       OBJECTIVE:    Vital signs:    /62   Pulse 76   Ht 6' 2\" (1.88 m)   Wt 232 lb (105.2 kg)   SpO2 98%   BMI 29.79 kg/m²      Physical Exam:  Constitutional:  Comfortable and alert, NAD, appears older than stated age  Eyes: PERRL, sclera nonicteric  Neck:  Supple, no masses, no thyroidmegaly, JVP 6  Skin:  Warm and dry; no rash or lesions  Heart: Regular, normal apex, S1 and S2 normal, no M/G/R  Lungs:  Normal respiratory effort; +wheezing  Abdomen: soft, non tender, + bowel sounds  Extremities:  No edema or cyanosis; no clubbing  Neuro: alert and oriented, moves legs and arms equally, normal mood and affect    Data Reviewed:      Echo 11/3/2020:  Limited study for cardiomyopathy and left ventricle systolic function. Definity is used for thrombus detection. Left ventricular systolic function is severely reduced with a visually   estimated ejection fraction of 25-30%. EF estimated by Gu's method at 30   %. The left ventricle is mildly dilated in size with normal wall thickness. Severe global hypokinesis with regional variation. Indeterminate diastolic function. Probable apical left ventricular thrombus is visualized. The left atrium is severely dilated. The right ventricle is mildly dilated. Compared to last echo on 6/5/20(EF <20%), left ventricle systolic function   has improved. Echo 6/5/2020:  Limited exam for LVEF and apical thrombus measuring 2x1.2cm. Left ventricular systolic function is severely reduced with a visually   estimated ejection fraction of <20 %. EF estimated by Gu's method at 17% %. The left ventricle is moderately dilated in size. Severe global hypokinesis with regional variation. The right ventricle is not well visualized but appears dilated in size. Right ventricular systolic function is reduced. Severe bi-atrial enlargement. Systolic pulmonary artery pressure (SPAP) is elevated and estimated at 44   mmHg (right atrial pressure 15 mmHg) consistent with mild pulmonary   hypertension. The IVC is dilated in size (>2.1 cm) and collapses <50% with respiration   consistent with elevated right atrial pressures (15 mmHg) . Stress test 2/13/2020:  Severely reduced LVEF 27%     Severe global hypokinesis with regional variation, more prominent     inferoapical hypokinesis is noted.     Severely enlarged LVEDV 277c and LVESV 203cc     Fixed inferoapical and lateral defects consistent with scar     No ischemia      Coronary angiogram 12/2012:  1. Severe 1-vessel CAD with 99% mid-RCA stenosis with thrombus and DARLING-2 distal flow  2. D2 with diffuse ostial-proximal 30% plaquing but not severe  3.  Moderately impaired LV systolic function with EF 40% with severe inferior hypokinesis but anterolateral mild hypokinesis  4. Successful aspiration thrombectomy/stenting of the mid-RCA with a 4.0x 15 mm RESOLUTE OLU post-dilated with a 4.5 mm noncompliant balloon to high pressure with 0% residual stenosis and DARLING-3 flow  5. Successful Angioseal device closure of the right femoral arteriotomy  During the procedure he went into atrial fibrillation was was successfully cardioverted into a sinus rhythm. No further post operative complications were noted. He was started on the appropriate medication and was counseled on each medication. Smoking cessation was discussed. He will be discharged today with instructions to follow-up with Dr Enedina Nava at the [facility] in 7 to 10 days. Cardiology Labs Reviewed:   CBC: No results for input(s): WBC, HGB, HCT, PLT in the last 72 hours. BMP:No results for input(s): NA, K, CO2, BUN, CREATININE, LABGLOM, GLUCOSE in the last 72 hours. PT/INR: No results for input(s): PROTIME, INR in the last 72 hours. APTT:No results for input(s): APTT in the last 72 hours. FASTING LIPID PANEL:  Lab Results   Component Value Date    HDL 30 11/11/2020    LDLCALC 76 11/11/2020    TRIG 189 11/11/2020     LIVER PROFILE:No results for input(s): AST, ALT, ALB in the last 72 hours.   BNP:   Lab Results   Component Value Date    PROBNP 469 11/11/2020    PROBNP 931 09/30/2020    PROBNP 445 02/15/2020    PROBNP 1,364 02/12/2020     Reviewed all labs and imaging today    Assessment:   Leg pain  Shortness of breath  CAD: no angina; no ischemia on stress test 2/2020; s/p OLU RCA in the setting of MI 12/2012  Ischemic cardiomyopathy: ongoing, EF 25-30% on echo 11/2020 from <20% on echo 6/2020 and 2/2020   - s/p BiV ICD 12/18/2020  Chronic combined CHF: appears mildly fluid overloaded  LV thrombus: probable on echo 11/2020; originally noted 2/2020  RBBB  Paroxysmal atrial fibrillation: stable   - [id number] score 4 (CHF, LV thrombus, MI)   - possible brief episode on device check today, will be reviewed by SUSIE CORCORAN   - noted initially 12/2012 post PCI with MI, s/p CV  HLD: stable, LDL 69, continue statin  Tobacco abuse: counseled    Plan:   1. ABIs for claudication  2. Check BMP, BNP, CBC for shortness of breath  3. Limited echo for LV thrombus  4. Continue coumadin through [facility] [place] [facility]  5. Continue aspirin, statin, carvedilol, lisinopril, spironolactone, lasix  6. Check BP at home and call the office if consistently out of goal range  7. Stay active along with a healthy diet  8.  Follow up with Dr. Rafia Valdivia in 2 months     Patient was seen outside of global device window for CAD    ERVIN Ramos-CNP  [facility]  [phone]

## 2021-03-30 ENCOUNTER — OFFICE VISIT (OUTPATIENT)
Dept: CARDIOLOGY CLINIC | Age: 51
End: 2021-03-30
Payer: MEDICAID

## 2021-03-30 ENCOUNTER — NURSE ONLY (OUTPATIENT)
Dept: CARDIOLOGY CLINIC | Age: 51
End: 2021-03-30
Payer: MEDICAID

## 2021-03-30 VITALS
BODY MASS INDEX: 29.77 KG/M2 | OXYGEN SATURATION: 98 % | HEIGHT: 74 IN | DIASTOLIC BLOOD PRESSURE: 62 MMHG | HEART RATE: 76 BPM | SYSTOLIC BLOOD PRESSURE: 112 MMHG | WEIGHT: 232 LBS

## 2021-03-30 DIAGNOSIS — I50.22 CHRONIC SYSTOLIC CHF (CONGESTIVE HEART FAILURE) (HCC): ICD-10-CM

## 2021-03-30 DIAGNOSIS — I25.10 CORONARY ARTERY DISEASE INVOLVING NATIVE CORONARY ARTERY OF NATIVE HEART WITHOUT ANGINA PECTORIS: Primary | ICD-10-CM

## 2021-03-30 DIAGNOSIS — I25.5 ISCHEMIC CARDIOMYOPATHY: ICD-10-CM

## 2021-03-30 DIAGNOSIS — I50.43 CHF (CONGESTIVE HEART FAILURE), NYHA CLASS I, ACUTE ON CHRONIC, COMBINED (HCC): ICD-10-CM

## 2021-03-30 DIAGNOSIS — Z95.810 PRESENCE OF CARDIAC RESYNCHRONIZATION THERAPY DEFIBRILLATOR (CRT-D): ICD-10-CM

## 2021-03-30 DIAGNOSIS — I51.3 LV (LEFT VENTRICULAR) MURAL THROMBUS: ICD-10-CM

## 2021-03-30 DIAGNOSIS — I73.9 CLAUDICATION (HCC): ICD-10-CM

## 2021-03-30 PROCEDURE — 93284 PRGRMG EVAL IMPLANTABLE DFB: CPT | Performed by: INTERNAL MEDICINE

## 2021-03-30 PROCEDURE — G8484 FLU IMMUNIZE NO ADMIN: HCPCS | Performed by: NURSE PRACTITIONER

## 2021-03-30 PROCEDURE — 4004F PT TOBACCO SCREEN RCVD TLK: CPT | Performed by: NURSE PRACTITIONER

## 2021-03-30 PROCEDURE — G8419 CALC BMI OUT NRM PARAM NOF/U: HCPCS | Performed by: NURSE PRACTITIONER

## 2021-03-30 PROCEDURE — G8427 DOCREV CUR MEDS BY ELIG CLIN: HCPCS | Performed by: NURSE PRACTITIONER

## 2021-03-30 PROCEDURE — 3017F COLORECTAL CA SCREEN DOC REV: CPT | Performed by: NURSE PRACTITIONER

## 2021-03-30 PROCEDURE — 93290 INTERROG DEV EVAL ICPMS IP: CPT | Performed by: NURSE PRACTITIONER

## 2021-03-30 PROCEDURE — 99214 OFFICE O/P EST MOD 30 MIN: CPT | Performed by: NURSE PRACTITIONER

## 2021-03-30 NOTE — PROGRESS NOTES
Patient presents to the device clinic today for a programming evaluation for his defibrillator. Patient has a history of ICM. Takes Coreg and coumadin. Last device interrogation was on 1/21. Since then, 4 AMS events recorded - events appear to be AT/AF vs. TWOS - burden <1%. 11 NSVT events recorded- some events appear to be true, some appear to be 1:1 AVC, longest events x 8 sec. AP 7.6% BVP >99%    All sensing and pacing parameters are within normal range. No changes need to be made at this time. Patient education was provided about device functionality, in home monitoring, and any other patient questions and/or concerns were addressed. Patient voices understanding. Please see interrogation for more detail. Patient will see BOBBY Hayes today in office. Patient will follow up in 3 months in office or remotely. See Paceart report under the Cardiology tab.

## 2021-03-30 NOTE — PATIENT INSTRUCTIONS
ABIs, call -[facility]  Heart ultrasound, call [facility]  Blood work  Continue to follow at coumadin clinic  Continue current medications  Follow up in 2 months with Dr. Shari Randall

## 2021-03-30 NOTE — LETTER
[facility]   Cardiology Note              Date:  March 29, 2021  Patientname: Mario Sanches  YOB: 1970    Primary Care physician: Kirshna Eric MD    HISTORY OF PRESENT ILLNESS: Mario Sanches is a 45 y.o. male with a history of CAD, cardiomyopathy, CHF, LV thrombus, AF, HLD. He has a history of inferior MI in 2012 with thrombectomy and OLU RCA. He had AF during PCI, treated with CV. EF 55-60% on echo. He did not follow up after 2014. He was admitted 2/2020 for shortness of breath. Echo showed EF 20-25%, +apical thrombus. Stress test 2/2020 showed fixed defects, no ischemia. Echo 6/5/2020 showed EF <20%. Echo 11/3/2020 showed EF 25-30%, probable  apical thrombus. On 12/18/2020 he had BiV ICD implanted. Today he presents for hospital 3 month follow up for cardiomyopathy s/p ICD. Device check today shows AP 7.6% BiV pacing >99%, brief NSVT, possible AT/AF <1%. He does have shortness of breath at baseline that is slightly worse than usual. He has bilateral leg pain with exertion. He has no chest pain, dizziness, edema. He had gained a little weight. 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 He smokes. Weight today 3/30/2021: 232 lbs (home weight 219 lbs)  Previous home weights: 213 lbs    Past Medical History:   has a past medical history of CAD (coronary artery disease), Heart attack ([address] and Neck pain. Past Surgical History:   has a past surgical history that includes cervical fusion and Cardiac surgery. Home Medications:    Prior to Admission medications    Medication Sig Start Date End Date Taking?  Authorizing Provider   lisinopril (PRINIVIL;ZESTRIL) 5 MG tablet TAKE (1) TABLET DAILY 1/4/21   Kathleen Arroyo MD   warfarin (COUMADIN) 5 MG tablet Take 2 to 2.5 tablets by mouth daily as directed by coumadin clinic 11/16/20   Kathleen Arroyo MD   spironolactone (ALDACTONE) 25 MG tablet Take 1 tablet by mouth daily 11/9/20   Rachelle Guzman APRN - CNP tiotropium (SPIRIVA HANDIHALER) 18 MCG inhalation capsule Inhale 1 capsule into the lungs daily 8/25/20   Siria Rowan MD   carvedilol (COREG) 6.25 MG tablet Take 1 tablet by mouth 2 times daily (with meals) 7/30/20   Derrick Vyas MD   atorvastatin (LIPITOR) 40 MG tablet Take 1 tablet by mouth nightly 6/23/20   Siria Rowan MD   furosemide (LASIX) 40 MG tablet Take 1 tablet by mouth daily 6/18/20   Derrick Vyas MD   aspirin 81 MG chewable tablet Take 1 tablet by mouth daily 2/15/20   ERVIN Meng - CNP   traMADol (ULTRAM) 50 MG tablet Take 100 mg by mouth every 8 hours as needed for Pain. Historical Provider, MD     Allergies:  Patient has no known allergies. Social History:   reports that he has been smoking cigarettes. He has a 28.00 pack-year smoking history. He has never used smokeless tobacco. He reports current alcohol use of about 2.0 standard drinks of alcohol per week. He reports that he does not use drugs. Family History: family history includes Diabetes in his mother; Heart Disease in his father and mother. Review of Systems   Review of Systems   Constitutional: Negative. Respiratory: Negative. Cardiovascular: Negative. Gastrointestinal: Negative. Neurological: Negative.       OBJECTIVE:    Vital signs:    /62   Pulse 76   Ht 6' 2\" (1.88 m)   Wt 232 lb (105.2 kg)   SpO2 98%   BMI 29.79 kg/m²      Physical Exam:  Constitutional:  Comfortable and alert, NAD, appears older than stated age  Eyes: PERRL, sclera nonicteric  Neck:  Supple, no masses, no thyroidmegaly, JVP 6  Skin:  Warm and dry; no rash or lesions  Heart: Regular, normal apex, S1 and S2 normal, no M/G/R  Lungs:  Normal respiratory effort; +wheezing  Abdomen: soft, non tender, + bowel sounds  Extremities:  No edema or cyanosis; no clubbing  Neuro: alert and oriented, moves legs and arms equally, normal mood and affect    Data Reviewed:      Echo 11/3/2020:  Limited study for cardiomyopathy and left ventricle systolic function. Definity is used for thrombus detection. Left ventricular systolic function is severely reduced with a visually   estimated ejection fraction of 25-30%. EF estimated by Gu's method at 30   %. The left ventricle is mildly dilated in size with normal wall thickness. Severe global hypokinesis with regional variation. Indeterminate diastolic function. Probable apical left ventricular thrombus is visualized. The left atrium is severely dilated. The right ventricle is mildly dilated. Compared to last echo on 6/5/20(EF <20%), left ventricle systolic function   has improved. Echo 6/5/2020:  Limited exam for LVEF and apical thrombus measuring 2x1.2cm. Left ventricular systolic function is severely reduced with a visually   estimated ejection fraction of <20 %. EF estimated by Gu's method at 17% %. The left ventricle is moderately dilated in size. Severe global hypokinesis with regional variation. The right ventricle is not well visualized but appears dilated in size. Right ventricular systolic function is reduced. Severe bi-atrial enlargement. Systolic pulmonary artery pressure (SPAP) is elevated and estimated at 44   mmHg (right atrial pressure 15 mmHg) consistent with mild pulmonary   hypertension. The IVC is dilated in size (>2.1 cm) and collapses <50% with respiration   consistent with elevated right atrial pressures (15 mmHg) . Stress test 2/13/2020:  Severely reduced LVEF 27%     Severe global hypokinesis with regional variation, more prominent     inferoapical hypokinesis is noted.     Severely enlarged LVEDV 277c and LVESV 203cc     Fixed inferoapical and lateral defects consistent with scar     No ischemia      Coronary angiogram 12/2012:  1. Severe 1-vessel CAD with 99% mid-RCA stenosis with thrombus and DARLING-2 distal flow  2. D2 with diffuse ostial-proximal 30% plaquing but not severe  3.  Moderately impaired LV systolic function with EF 40% with severe inferior hypokinesis but anterolateral mild hypokinesis  4. Successful aspiration thrombectomy/stenting of the mid-RCA with a 4.0x 15 mm RESOLUTE OLU post-dilated with a 4.5 mm noncompliant balloon to high pressure with 0% residual stenosis and DARLING-3 flow  5. Successful Angioseal device closure of the right femoral arteriotomy  During the procedure he went into atrial fibrillation was was successfully cardioverted into a sinus rhythm. No further post operative complications were noted. He was started on the appropriate medication and was counseled on each medication. Smoking cessation was discussed. He will be discharged today with instructions to follow-up with Dr Corey Bence at the [facility] in 7 to 10 days. Cardiology Labs Reviewed:   CBC: No results for input(s): WBC, HGB, HCT, PLT in the last 72 hours. BMP:No results for input(s): NA, K, CO2, BUN, CREATININE, LABGLOM, GLUCOSE in the last 72 hours. PT/INR: No results for input(s): PROTIME, INR in the last 72 hours. APTT:No results for input(s): APTT in the last 72 hours. FASTING LIPID PANEL:  Lab Results   Component Value Date    HDL 30 11/11/2020    LDLCALC 76 11/11/2020    TRIG 189 11/11/2020     LIVER PROFILE:No results for input(s): AST, ALT, ALB in the last 72 hours.   BNP:   Lab Results   Component Value Date    PROBNP 469 11/11/2020    PROBNP 931 09/30/2020    PROBNP 445 02/15/2020    PROBNP 1,364 02/12/2020     Reviewed all labs and imaging today    Assessment:   Leg pain  Shortness of breath  CAD: no angina; no ischemia on stress test 2/2020; s/p OLU RCA in the setting of MI 12/2012  Ischemic cardiomyopathy: ongoing, EF 25-30% on echo 11/2020 from <20% on echo 6/2020 and 2/2020   - s/p BiV ICD 12/18/2020  Chronic combined CHF: appears mildly fluid overloaded  LV thrombus: probable on echo 11/2020; originally noted 2/2020  RBBB  Paroxysmal atrial fibrillation: stable   - [id number] score 4 (CHF, LV

## 2021-06-01 RX ORDER — FUROSEMIDE 40 MG/1
TABLET ORAL
Qty: 30 TABLET | Refills: 9 | Status: SHIPPED | OUTPATIENT
Start: 2021-06-01 | End: 2021-10-19 | Stop reason: SDUPTHER

## 2021-06-07 ENCOUNTER — ANTI-COAG VISIT (OUTPATIENT)
Dept: PHARMACY | Age: 51
End: 2021-06-07
Payer: MEDICAID

## 2021-06-07 DIAGNOSIS — I51.3 LEFT VENTRICULAR THROMBOSIS: Primary | ICD-10-CM

## 2021-06-07 LAB — INR BLD: 2.5

## 2021-06-07 PROCEDURE — 85610 PROTHROMBIN TIME: CPT | Performed by: FAMILY MEDICINE

## 2021-06-07 PROCEDURE — 99211 OFF/OP EST MAY X REQ PHY/QHP: CPT | Performed by: FAMILY MEDICINE

## 2021-06-07 NOTE — PROGRESS NOTES
Mr. Mariola Vasquez is here for management of anticoagulation for left ventricular thrombus. PMH also significant for CAD, CHF. He presents today w/out complaint. Pt verifies dosing regimen as listed above. Pt denies s/s bleeding/bruising/swelling/SOB. No BRBPR. No melena. Address missed doses  Reviewed pt medication list  No changes in RX/OTCs/Herbal medications. Reviewed dietary concerns  Address EToH and tobacco use. Patient has not been into the office in a while. He is currently taking 10 mg daily. INR is in range, will continue this dose. He has an angiogram to see if he still has a clot. Otherwise, may go off warfarin     Pt seen in office today  97.9 deg F    INR 2.5 is within therapeutic range of 2-3. Recommend to continue 10 mg daily   Patient has 5 mg tablets. Will continue to monitor and check INR in 1 weeks. Dosing reminder card given with phone number, appointment date and time.    Return to clinic: patient will call    Bhargavi Corbett PharmD 6/7/2021 3:37 PM

## 2021-06-22 ENCOUNTER — HOSPITAL ENCOUNTER (OUTPATIENT)
Dept: VASCULAR LAB | Age: 51
Discharge: HOME | End: 2021-06-22
Payer: MEDICAID

## 2021-06-22 ENCOUNTER — HOSPITAL ENCOUNTER (OUTPATIENT)
Dept: NON INVASIVE DIAGNOSTICS | Age: 51
Discharge: HOME | End: 2021-06-22
Payer: MEDICAID

## 2021-06-22 ENCOUNTER — HOSPITAL ENCOUNTER (OUTPATIENT)
Age: 51
Discharge: HOME | End: 2021-06-22
Payer: MEDICAID

## 2021-06-22 DIAGNOSIS — I51.3 LV (LEFT VENTRICULAR) MURAL THROMBUS: ICD-10-CM

## 2021-06-22 DIAGNOSIS — I73.9 CLAUDICATION (HCC): ICD-10-CM

## 2021-06-22 LAB
ANION GAP SERPL CALCULATED.3IONS-SCNC: 11 MMOL/L (ref 3–16)
BASOPHILS ABSOLUTE: 0.1 K/UL (ref 0–0.2)
BASOPHILS RELATIVE PERCENT: 0.9 %
BUN BLDV-MCNC: 13 MG/DL (ref 7–20)
CALCIUM SERPL-MCNC: 9.4 MG/DL (ref 8.3–10.6)
CHLORIDE BLD-SCNC: 100 MMOL/L (ref 99–110)
CO2: 25 MMOL/L (ref 21–32)
CREAT SERPL-MCNC: 0.8 MG/DL (ref 0.9–1.3)
EOSINOPHILS ABSOLUTE: 0.1 K/UL (ref 0–0.6)
EOSINOPHILS RELATIVE PERCENT: 1.7 %
GFR AFRICAN AMERICAN: >60
GFR NON-AFRICAN AMERICAN: >60
GLUCOSE BLD-MCNC: 100 MG/DL (ref 70–99)
HCT VFR BLD CALC: 44.1 % (ref 40.5–52.5)
HEMOGLOBIN: 15.6 G/DL (ref 13.5–17.5)
LYMPHOCYTES ABSOLUTE: 1.9 K/UL (ref 1–5.1)
LYMPHOCYTES RELATIVE PERCENT: 23.3 %
MCH RBC QN AUTO: 32.9 PG (ref 26–34)
MCHC RBC AUTO-ENTMCNC: 35.3 G/DL (ref 31–36)
MCV RBC AUTO: 93.1 FL (ref 80–100)
MONOCYTES ABSOLUTE: 0.6 K/UL (ref 0–1.3)
MONOCYTES RELATIVE PERCENT: 7.8 %
NEUTROPHILS ABSOLUTE: 5.4 K/UL (ref 1.7–7.7)
NEUTROPHILS RELATIVE PERCENT: 66.3 %
PDW BLD-RTO: 12.7 % (ref 12.4–15.4)
PLATELET # BLD: 172 K/UL (ref 135–450)
PMV BLD AUTO: 11 FL (ref 5–10.5)
POTASSIUM SERPL-SCNC: 4.4 MMOL/L (ref 3.5–5.1)
PRO-BNP: 652 PG/ML (ref 0–124)
RBC # BLD: 4.74 M/UL (ref 4.2–5.9)
SODIUM BLD-SCNC: 136 MMOL/L (ref 136–145)
WBC # BLD: 8.1 K/UL (ref 4–11)

## 2021-06-22 PROCEDURE — 6360000004 HC RX CONTRAST MEDICATION: Performed by: NURSE PRACTITIONER

## 2021-06-22 PROCEDURE — 80048 BASIC METABOLIC PNL TOTAL CA: CPT

## 2021-06-22 PROCEDURE — 93308 TTE F-UP OR LMTD: CPT

## 2021-06-22 PROCEDURE — 93922 UPR/L XTREMITY ART 2 LEVELS: CPT

## 2021-06-22 PROCEDURE — 36415 COLL VENOUS BLD VENIPUNCTURE: CPT

## 2021-06-22 PROCEDURE — 85025 COMPLETE CBC W/AUTO DIFF WBC: CPT

## 2021-06-22 PROCEDURE — 83880 ASSAY OF NATRIURETIC PEPTIDE: CPT

## 2021-06-22 RX ADMIN — PERFLUTREN 2 MG: 6.52 INJECTION, SUSPENSION INTRAVENOUS at 12:15

## 2021-06-22 NOTE — PROGRESS NOTES
Patient has NKDA. Did not receive the covid vaccine. Definity ivp given for enhanced echo imaging. Patient tolerated well without side effects.

## 2021-06-23 ENCOUNTER — TELEPHONE (OUTPATIENT)
Dept: CARDIOLOGY CLINIC | Age: 51
End: 2021-06-23

## 2021-06-23 NOTE — TELEPHONE ENCOUNTER
----- Message from ERVIN Ramos CNP sent at 6/22/2021  9:11 PM EDT -----  Please let him know that ultrasound did show known weak heart muscle but did not show definitive thrombus, would continue coumadin until he sees Dr. Chacho Nicole next week. His blood work is stable. His ABIs did not shows significant blockage in his leg arteries. No changes and follow up as planned next week. Thanks!

## 2021-06-23 NOTE — TELEPHONE ENCOUNTER
Covering for Oksana Nguyen NP. Please have patient send device check today to evaluate the thoracic impedance on his [facility] Darryl device. Lab Results   Component Value Date     06/22/2021    K 4.4 06/22/2021    K 4.3 12/18/2020     06/22/2021    CO2 25 06/22/2021    BUN 13 06/22/2021    CREATININE 0.8 06/22/2021    GLUCOSE 100 06/22/2021    CALCIUM 9.4 06/22/2021          We can go ahead and increase his Lasix to 1-1/2 tablets in the morning and 1 tablet in the afternoon for 2 days. Then reduce back to 1 tablet daily. Call back if shortness of breath not improved.   Keep follow-up as planned

## 2021-06-23 NOTE — TELEPHONE ENCOUNTER
Called patient to relay message from [facility], he is not home right now and would like to call us back when he gets home to receive message.

## 2021-06-25 NOTE — PROGRESS NOTES
[facility]   Cardiac Followup    Referring Provider:  No primary care provider on file. Chief Complaint   Patient presents with    3 Month Follow-Up    Coronary Artery Disease    Cardiomyopathy    Other     Feels better today but has a lot of fluid retention a couple days ago      Subjective: Patient is being seen today for cardiology follow up hx apical thrombus, CAD, ischemic CM, s/p BiV-ICD placement 12/18/20; c/o SOB bending over and activity with arms today    Past Medical History:  Gee Poag a 45 y. o. patient admitted [facility] 2/12/20 with c/o SOB. Prior saw Dr. [facility] 5/7/14. He has PMH small inferior MI in 2012 with OLU to RCA, ischemic CM EF<20%, s/p ICD per Dr. Juan Garcia 12/20, apical thrombus dx 2/20 on warfarin (follows [facility]), hx PAF on device check and prior afib during PCI s/p CV, HLD, and chronic tobacco abuse.  Most [address] 12/13/12 Severe 1V CAD with 99% mid-RCA stenosis with thrombus and DARLING-2 distal flow D2 with diffuse ostial-proximal 30% plaquing; EF 40%; s/p thrombectomy/stenting of the mid-RCA with a 4.0x 15 mm RESOLUTE OLU.   During the procedure he went into afib and cardioverted to NSR.       Admitted mid-Feb 2020 and dx with ischemic CM, acute sysolic CHF, and apical thrombus. Most recent Lexiscan myoview stress test 02/13/20 showed severely reduced LVEF 27%; more prominent  inferoapical HK noted; Severely enlarged LV. Fixed inferoapical and lateral defects c/w scar showed no ischemia. No LHC recommended with only medical management. Note ECHO 02/13/20 EF=20-25% (EF=55-60% 12/12 study); more prominent HK of the inferolateral and apical segments. small-medium sized apical thrombus; Grade II DD; severe LAE; AV sclerosis; Mild MR. Started on CHF med regimen plus coumadin for [facility] of apical thrombus. Note Limited ECHO 11/3/20 EF=25-30%. Severe global HK; Indeterminate diastolic function. Probable apical LV thrombus visualized.  On 12/18/20 had BiV-ICD placed by Dr. Juan Garcia. Most recent EKG 12/19/20 SR with apical pacing. Most recent device check 3/30/21 normal device function  shows AP 7.6% BiV pacing >99%, brief NSVT, possible AT/AF <1%. Most recent limited ECHO 6/22/21 EF=15-20%. No definitive thrombus. Most recent lower extremity FOREST 6/22/21 No significant evidence of large vessel arterial occlusive disease of the  lower extremities. '   Today he reports recent SOB and BLE edema improved now. On 6/23/21 [facility] [employer] increased his lasix to 60mg in AM and 40mg in PM for 2 days and then back to 40mg daily. He reports edema is better. He has PATTERSON. Denies CP, edema, dizziness, palpitations and syncope. He has not received COVID vaccine and dies not plan to at this time. Past Medical History:   has a past medical history of CAD (coronary artery disease), Heart attack ([address] and Neck pain. Surgical History:   has a past surgical history that includes cervical fusion and Cardiac surgery. Social History:   reports that he has been smoking cigarettes. He has a 7.00 pack-year smoking history. He has never used smokeless tobacco. He reports current alcohol use of about 2.0 standard drinks of alcohol per week. He reports that he does not use drugs. Family History:  family history includes Diabetes in his mother; Heart Disease in his father and mother. Home Medications:  Prior to Admission medications    Medication Sig Start Date End Date Taking?  Authorizing Provider   furosemide (LASIX) 40 MG tablet TAKE (1) TABLET DAILY 6/1/21  Yes Toby Kent MD   lisinopril (PRINIVIL;ZESTRIL) 5 MG tablet TAKE (1) TABLET DAILY 1/4/21  Yes Toby Kent MD   warfarin (COUMADIN) 5 MG tablet Take 2 to 2.5 tablets by mouth daily as directed by coumadin clinic 11/16/20  Yes Toby Kent MD   spironolactone (ALDACTONE) 25 MG tablet Take 1 tablet by mouth daily 11/9/20  Yes ERVIN Merrill - CNP   tiotropium (Janae Hodgkin) 18 MCG ECHO 6/22/21 EF=15-20%. No definitive thrombus Will  continue CHF regimen of lasix, coreg, and aldactone. Will switch from lisinopril to entresto to see if can make him feel better clinically. 4. Warfarin anticoagulation: Apical thrombus resolved and NO AC. See #3 above. Enrolled in [place]. [facility] cou[facility]. Given short amount of PAF on ICD check 3/21 and hx LV thrombus prior with severe LV dysfunction I believe he merits AC with coumadin indefinitely. 5.      Most recent lipids 11/11/20 I personally reviewed lab results in epic (see above) and discussed with patient. Well controlled except for lower HDL and will continue current medical regimen. Plan:  1. Meds reviewed   2. In you notice weight gain of 3-4 lbs or increase SOB and edema. OK to take an extra lasix pill for 2-3 days. 3. Recommend you remain on coumadin for Hx of apical thrombus and possible AT/AF on device check   4. Will start Entresto 24-26mg 2 times daily if able to afford. If able to afford stop Lisinopril 2 days before starting Entresto. 5. Follow up with [facility] next available to titrate entresto if able. Follow up with me in October 6. Recommend COVID vaccine   7. Need FLP recheck by end of 2021. This note was scribed in the presence of Ayo Isaacs MD by Yung Alejandro RN     I, Dr. Srinivas Johnson, personally performed the services described in this documentation, as scribed by the above signed scribe in my presence. It is both accurate and complete to my knowledge. I agree with the details independently gathered by the clinical support staff, while the remaining scribed note accurately describes my personal service to the patient. Cost of prescription medications and patient compliance have been reviewed with patient. All questions answered. Thank you for allowing me to participate in the care of this individual.    Pina Garcia.  Arnoldo Lynne M.D., [facility]

## 2021-06-28 ENCOUNTER — NURSE ONLY (OUTPATIENT)
Dept: CARDIOLOGY CLINIC | Age: 51
End: 2021-06-28
Payer: MEDICAID

## 2021-06-28 ENCOUNTER — OFFICE VISIT (OUTPATIENT)
Dept: CARDIOLOGY CLINIC | Age: 51
End: 2021-06-28
Payer: MEDICAID

## 2021-06-28 VITALS
SYSTOLIC BLOOD PRESSURE: 110 MMHG | DIASTOLIC BLOOD PRESSURE: 76 MMHG | WEIGHT: 227.8 LBS | BODY MASS INDEX: 29.24 KG/M2 | HEIGHT: 74 IN | OXYGEN SATURATION: 98 % | HEART RATE: 84 BPM | TEMPERATURE: 97.4 F

## 2021-06-28 DIAGNOSIS — I50.21 ACUTE SYSTOLIC (CONGESTIVE) HEART FAILURE (HCC): ICD-10-CM

## 2021-06-28 DIAGNOSIS — I50.22 CHRONIC SYSTOLIC (CONGESTIVE) HEART FAILURE (HCC): ICD-10-CM

## 2021-06-28 DIAGNOSIS — I51.7 CARDIOMEGALY: ICD-10-CM

## 2021-06-28 DIAGNOSIS — Z72.0 CONTINUOUS TOBACCO ABUSE: ICD-10-CM

## 2021-06-28 DIAGNOSIS — I25.10 CORONARY ARTERY DISEASE INVOLVING NATIVE CORONARY ARTERY OF NATIVE HEART WITHOUT ANGINA PECTORIS: Primary | ICD-10-CM

## 2021-06-28 DIAGNOSIS — Z95.810 PRESENCE OF CARDIAC RESYNCHRONIZATION THERAPY DEFIBRILLATOR (CRT-D): ICD-10-CM

## 2021-06-28 DIAGNOSIS — I25.5 ISCHEMIC CARDIOMYOPATHY: ICD-10-CM

## 2021-06-28 DIAGNOSIS — I50.43 CHF (CONGESTIVE HEART FAILURE), NYHA CLASS I, ACUTE ON CHRONIC, COMBINED (HCC): ICD-10-CM

## 2021-06-28 PROCEDURE — G8419 CALC BMI OUT NRM PARAM NOF/U: HCPCS | Performed by: INTERNAL MEDICINE

## 2021-06-28 PROCEDURE — G8427 DOCREV CUR MEDS BY ELIG CLIN: HCPCS | Performed by: INTERNAL MEDICINE

## 2021-06-28 PROCEDURE — 3017F COLORECTAL CA SCREEN DOC REV: CPT | Performed by: INTERNAL MEDICINE

## 2021-06-28 PROCEDURE — 99214 OFFICE O/P EST MOD 30 MIN: CPT | Performed by: INTERNAL MEDICINE

## 2021-06-28 PROCEDURE — 4004F PT TOBACCO SCREEN RCVD TLK: CPT | Performed by: INTERNAL MEDICINE

## 2021-06-28 RX ORDER — SACUBITRIL AND VALSARTAN 24; 26 MG/1; MG/1
1 TABLET, FILM COATED ORAL 2 TIMES DAILY
Qty: 60 TABLET | Refills: 11 | Status: CANCELLED | OUTPATIENT
Start: 2021-06-28

## 2021-06-28 RX ORDER — WARFARIN SODIUM 5 MG/1
TABLET ORAL
Qty: 75 TABLET | Refills: 3 | Status: SHIPPED | OUTPATIENT
Start: 2021-06-28 | End: 2022-01-18

## 2021-06-28 NOTE — PATIENT INSTRUCTIONS
Plan:  1. Meds reviewed   2. In you notice weight gain of 3-4 lbs or increase SOB and edema. OK to take an extra lasix pill for 2-3 days. 3. Recommend you remain on coumadin for Hx of apical thrombus and possible AT/AF on device check   4. Will start Entresto 24-26mg 2 times daily if able to afford. If able to afford stop Lisinopril 2 days before starting Entresto. 5. Follow up with [facility] next available. Follow up with me in October    3.  Recommend COVID vaccine

## 2021-06-29 NOTE — PROGRESS NOTES
Remote transmission received for patients CRT-D. Transmission shows normal sensing and pacing function. EP physician will review. See interrogation under the cardiology tab in the [address] [facility] [address] field for more details. Will continue to monitor remotely. 1 NSVT (coreg)  and AT recorded. Longest  AT 3.5 min. AT/AF burden < 1%. Corvue is within normal limits. He takes coumadin for LV thrombus.

## 2021-07-01 PROCEDURE — 93296 REM INTERROG EVL PM/IDS: CPT | Performed by: INTERNAL MEDICINE

## 2021-07-01 PROCEDURE — 93297 REM INTERROG DEV EVAL ICPMS: CPT | Performed by: INTERNAL MEDICINE

## 2021-07-01 PROCEDURE — 93295 DEV INTERROG REMOTE 1/2/MLT: CPT | Performed by: INTERNAL MEDICINE

## 2021-07-07 RX ORDER — SACUBITRIL AND VALSARTAN 24; 26 MG/1; MG/1
1 TABLET, FILM COATED ORAL 2 TIMES DAILY
Qty: 60 TABLET | Refills: 11 | OUTPATIENT
Start: 2021-07-07

## 2021-07-07 RX ORDER — SACUBITRIL AND VALSARTAN 24; 26 MG/1; MG/1
1 TABLET, FILM COATED ORAL 2 TIMES DAILY
Qty: 60 TABLET | Refills: 11 | Status: SHIPPED | OUTPATIENT
Start: 2021-07-07 | End: 2022-07-27

## 2021-07-07 NOTE — TELEPHONE ENCOUNTER
Per PT pharmacy cannot determine cost of Entresto without a script being sent to them. Pt uses [facility].

## 2021-07-12 ENCOUNTER — TELEPHONE (OUTPATIENT)
Dept: CARDIOLOGY CLINIC | Age: 51
End: 2021-07-12

## 2021-07-12 NOTE — TELEPHONE ENCOUNTER
OK. Go ahead and start Entresto and continue medical regimen otherwise as prescribed. Let's see how he responds to [facility]. If still having issues after 1 week of so then please call back and I will need to address. Thanks.

## 2021-07-13 NOTE — TELEPHONE ENCOUNTER
Isaias Orona PA was sent to [facility] to be signed. Once received back I will fax to the appropriate number. PA was faxed to Isaias Orona.  Will notify pt once receive answer from insurance

## 2021-07-14 NOTE — TELEPHONE ENCOUNTER
Attempted to contact the pt. Went to  but couldn't make out the name that was on there. I did not leave a VM, will try again @ a later time. Pt's Entresto PA was approved thru [employer], once I get in contact with the pt I will let him know the outcome and to contact his pharmacy.

## 2021-07-21 ASSESSMENT — ENCOUNTER SYMPTOMS
RESPIRATORY NEGATIVE: 1
GASTROINTESTINAL NEGATIVE: 1

## 2021-07-22 ENCOUNTER — OFFICE VISIT (OUTPATIENT)
Dept: CARDIOLOGY CLINIC | Age: 51
End: 2021-07-22
Payer: MEDICAID

## 2021-07-22 VITALS
SYSTOLIC BLOOD PRESSURE: 100 MMHG | OXYGEN SATURATION: 100 % | WEIGHT: 225.8 LBS | HEIGHT: 74 IN | TEMPERATURE: 97.3 F | BODY MASS INDEX: 28.98 KG/M2 | HEART RATE: 71 BPM | DIASTOLIC BLOOD PRESSURE: 80 MMHG

## 2021-07-22 DIAGNOSIS — I51.3 LV (LEFT VENTRICULAR) MURAL THROMBUS: ICD-10-CM

## 2021-07-22 DIAGNOSIS — E78.5 DYSLIPIDEMIA: ICD-10-CM

## 2021-07-22 DIAGNOSIS — I25.5 ISCHEMIC CARDIOMYOPATHY: ICD-10-CM

## 2021-07-22 DIAGNOSIS — I25.10 CORONARY ARTERY DISEASE INVOLVING NATIVE CORONARY ARTERY OF NATIVE HEART WITHOUT ANGINA PECTORIS: ICD-10-CM

## 2021-07-22 DIAGNOSIS — I50.22 CHRONIC SYSTOLIC (CONGESTIVE) HEART FAILURE (HCC): Primary | ICD-10-CM

## 2021-07-22 PROCEDURE — 99214 OFFICE O/P EST MOD 30 MIN: CPT | Performed by: NURSE PRACTITIONER

## 2021-07-22 PROCEDURE — 4004F PT TOBACCO SCREEN RCVD TLK: CPT | Performed by: NURSE PRACTITIONER

## 2021-07-22 PROCEDURE — G8427 DOCREV CUR MEDS BY ELIG CLIN: HCPCS | Performed by: NURSE PRACTITIONER

## 2021-07-22 PROCEDURE — 3017F COLORECTAL CA SCREEN DOC REV: CPT | Performed by: NURSE PRACTITIONER

## 2021-07-22 PROCEDURE — G8419 CALC BMI OUT NRM PARAM NOF/U: HCPCS | Performed by: NURSE PRACTITIONER

## 2021-07-22 NOTE — PATIENT INSTRUCTIONS
Everything looks great today, good job!   Continue current medications  Obtain fasting labs, do not eat or drink 8 hours prior, water and black coffee ok  Stay active along with a healthy diet  Follow up as planned

## 2021-07-22 NOTE — PROGRESS NOTES
[address]   Cardiology Note              Date:  July 21, 2021  Patientname: Lisbeth Gandhi  YOB: 1970    Primary Care physician: No primary care provider on file. HISTORY OF PRESENT ILLNESS: Lisbeth Gandhi is a 45 y.o. male with a history of CAD, cardiomyopathy, CHF, LV thrombus, AF, HLD. He has a history of inferior MI in 2012 with thrombectomy and OLU RCA. He had AF during PCI, treated with CV. EF 55-60% on echo. He did not follow up after 2014. He was admitted 2/2020 for shortness of breath. Echo showed EF 20-25%, +apical thrombus. Stress test 2/2020 showed fixed defects, no ischemia. Echo 6/5/2020 showed EF <20%. Echo 11/3/2020 showed EF 25-30%, probable apical thrombus. On 12/18/2020 he had BiV ICD implanted. Echo 6/22/2021 showed EF 15-20%. At office visit 6/28/2021, entresto started. Today he presents for follow up for CHF. He feels wonderful since starting entresto. His energy level has improved and he has increased activity at work/home. Denies chest pain, shortness of breath, palpitations and dizziness. He has lost weight. He smokes. Weight today 7/22/2021: 225 lbs (221 lbs at home)  Office weight 6/28/2021: 227 lbs    Past Medical History:   has a past medical history of CAD (coronary artery disease), Heart attack ([address] and Neck pain. Past Surgical History:   has a past surgical history that includes cervical fusion and Cardiac surgery. Home Medications:    Prior to Admission medications    Medication Sig Start Date End Date Taking?  Authorizing Provider   sacubitril-valsartan (ENTRESTO) 24-26 MG per tablet Take 1 tablet by mouth 2 times daily 7/7/21   Jhoana Garcia MD   warfarin (COUMADIN) 5 MG tablet TAKE 2 TO 2.5 TABLETS AS DIRECTED BY COUMADIN CLINIC 6/28/21   Jhoana Garcia MD   furosemide (LASIX) 40 MG tablet TAKE (1) TABLET DAILY 6/1/21   Jhoana Garcia MD   spironolactone (ALDACTONE) 25 MG tablet Take 1 tablet by mouth daily 11/9/20 systolic function is reduced with ejection fraction   estimated at 15-20 %. Left ventricular size is severely increased. Normal left ventricular wall thickness. 11/03/2020 25-30% EF, LVE, severe global hypokinesis with regional   variation, probable3 LV Thrombus (Definity)    Echo 11/3/2020:  Limited study for cardiomyopathy and left ventricle systolic function. Definity is used for thrombus detection. Left ventricular systolic function is severely reduced with a visually   estimated ejection fraction of 25-30%. EF estimated by Gu's method at 30   %. The left ventricle is mildly dilated in size with normal wall thickness. Severe global hypokinesis with regional variation. Indeterminate diastolic function. Probable apical left ventricular thrombus is visualized. The left atrium is severely dilated. The right ventricle is mildly dilated. Compared to last echo on 6/5/20(EF <20%), left ventricle systolic function   has improved. Echo 6/5/2020:  Limited exam for LVEF and apical thrombus measuring 2x1.2cm. Left ventricular systolic function is severely reduced with a visually   estimated ejection fraction of <20 %. EF estimated by Gu's method at 17% %. The left ventricle is moderately dilated in size. Severe global hypokinesis with regional variation. The right ventricle is not well visualized but appears dilated in size. Right ventricular systolic function is reduced. Severe bi-atrial enlargement. Systolic pulmonary artery pressure (SPAP) is elevated and estimated at 44   mmHg (right atrial pressure 15 mmHg) consistent with mild pulmonary   hypertension. The IVC is dilated in size (>2.1 cm) and collapses <50% with respiration   consistent with elevated right atrial pressures (15 mmHg) .     Stress test 2/13/2020:  Severely reduced LVEF 27%     Severe global hypokinesis with regional variation, more prominent     inferoapical hypokinesis is noted.     Severely enlarged

## 2021-08-06 ENCOUNTER — TELEPHONE (OUTPATIENT)
Dept: CARDIOLOGY CLINIC | Age: 51
End: 2021-08-06

## 2021-08-06 NOTE — TELEPHONE ENCOUNTER
SMM, what dosing of Lasix would you advise the pt to be on? Last OV note plan is below. Med list sts 40mg qd? Note on med list sts that pt was taking 60mg in the AM and 40mg in the PM. Please advise on dosing you want him on.  Thank you

## 2021-08-06 NOTE — TELEPHONE ENCOUNTER
Pt told his pharmacy that he takes 1 tablet of Lasix 40 mg and then 2 tablets the next day and continues to alternate. If this is the correct dosing the pharmacy needs a new prescription. Per last OV note SMM stated if pt has a weight gain of 3-4 lbs or increase in SOB or Edema pt is okay to take an extra Lasix pill for 2-3 days. Please call [address] [place] [address], [facility] @ [phone] and clarify what the correct dosing is.

## 2021-08-06 NOTE — TELEPHONE ENCOUNTER
Go ahead and prescribe what patient says he is taking. Sounds like lasix 40mg alternating with 80mg daily. Confirm and then prescribe. Thanks.

## 2021-08-16 RX ORDER — CARVEDILOL 6.25 MG/1
6.25 TABLET ORAL 2 TIMES DAILY WITH MEALS
Qty: 180 TABLET | Refills: 3 | Status: SHIPPED | OUTPATIENT
Start: 2021-08-16 | End: 2022-04-13 | Stop reason: SDUPTHER

## 2021-09-09 RX ORDER — SPIRONOLACTONE 25 MG/1
25 TABLET ORAL DAILY
Qty: 90 TABLET | Refills: 3 | Status: SHIPPED | OUTPATIENT
Start: 2021-09-09 | End: 2022-04-13 | Stop reason: SDUPTHER

## 2021-09-27 ENCOUNTER — NURSE ONLY (OUTPATIENT)
Dept: CARDIOLOGY CLINIC | Age: 51
End: 2021-09-27
Payer: MEDICAID

## 2021-09-27 DIAGNOSIS — Z95.810 PRESENCE OF CARDIAC RESYNCHRONIZATION THERAPY DEFIBRILLATOR (CRT-D): ICD-10-CM

## 2021-09-27 DIAGNOSIS — I50.43 CHF (CONGESTIVE HEART FAILURE), NYHA CLASS I, ACUTE ON CHRONIC, COMBINED (HCC): ICD-10-CM

## 2021-09-27 DIAGNOSIS — I25.5 ISCHEMIC CARDIOMYOPATHY: ICD-10-CM

## 2021-09-27 DIAGNOSIS — I50.22 CHRONIC SYSTOLIC (CONGESTIVE) HEART FAILURE (HCC): ICD-10-CM

## 2021-09-27 PROCEDURE — 93295 DEV INTERROG REMOTE 1/2/MLT: CPT | Performed by: INTERNAL MEDICINE

## 2021-09-27 PROCEDURE — 93296 REM INTERROG EVL PM/IDS: CPT | Performed by: INTERNAL MEDICINE

## 2021-09-27 PROCEDURE — 93297 REM INTERROG DEV EVAL ICPMS: CPT | Performed by: INTERNAL MEDICINE

## 2021-09-29 NOTE — PROGRESS NOTES
Remote transmission received for patients CRT-D. Transmission shows normal sensing and pacing function. EP physician will review. See interrogation under the cardiology tab in the [address] [facility]"" [address] field for more details. Will continue to monitor remotely. 2 NSVT (coreg)  and AT recorded. Longest  AT 52 min. AT/AF burden < 1%. events appear to be AT vs. TWOS. Corvue is within normal limits. He takes coumadin for LV thrombus.   BVP >99%

## 2021-10-15 NOTE — PROGRESS NOTES
[facility]   Cardiac Followup    Referring Provider:  No primary care provider on file. Chief Complaint   Patient presents with    3 Month Follow-Up    Coronary Artery Disease    Other     Patient is feeling better , still gets a little winded depending on what he does      Subjective: Patient is being seen today for cardiology follow up hx apical thrombus, CAD, ischemic CM, s/p BiV-ICD placement 12/18/20; c/o bloating today    Past Medical History:  Shantanu Cameron a 45 y. o. patient admitted [facility] 2/12/20 with c/o SOB. Prior saw Dr. Antonio Freeman 5/7/14. He has PMH small inferior MI in 2012 with OLU to RCA, ischemic CM EF<20%, s/p BiV-ICD per Dr. Puneet Urban 12/20, apical thrombus dx 2/20 on warfarin (follows [facility]), hx PAF, HLD, and chronic tobacco abuse.  Most [address] 12/13/12 Severe 1V CAD with 99% mid-RCA stenosis with thrombus and DARLING-2 distal flow D2 with diffuse ostial-proximal 30% plaquing; EF 40%; s/p thrombectomy/stenting of the mid-RCA with a 4.0x 15 mm RESOLUTE OLU.   During the procedure he went into afib and cardioverted to NSR.       Admitted mid-Feb 2020 with ischemic CM, acute sysolic CHF, and apical thrombus. Most recent Lexiscan myoview stress test 02/13/20 showed severely reduced LVEF 27%; more prominent  inferoapical HK noted; Severely enlarged LV. Fixed inferoapical and lateral defects c/w scar showed no ischemia. No C recommended with only medical management. Started on CHF med regimen plus coumadin for [facility] of apical thrombus. Note Limited ECHO 11/3/20 EF=25-30% (EF=20-25% in 2/20; EF=55-60^ in 12/12). . Severe global HK; Indeterminate diastolic function. Probable apical LV thrombus. On 12/18/20 had BiV-ICD placed by Dr. Puneet Urban. Most recent EKG 12/19/20 SR with apical pacing. Most recent limited ECHO 6/22/21 EF=15-20%. No definitive thrombus.  Most recent lower extremity FOREST 6/22/21 No significant evidence of large vessel arterial occlusive disease of the  lower extremities. Most recent device check 9/27/21 showed normal function. AT/AF <1% burden; 2 NSVT. Today he reports feeling good. He notes that he has joint pain and bloating after eating larger meals. He notes getting winded once in a while. Still able to work and do daily activities overall without issues. He is compliant with medications and tolerates them well. His baseline weight at home is 221lb. Weight xzhli=629# (up 2# from 6/21)    Past Medical History:   has a past medical history of CAD (coronary artery disease), Heart attack ([address] and Neck pain. Surgical History:   has a past surgical history that includes cervical fusion and Cardiac surgery. Social History:   reports that he has been smoking cigarettes. He has a 7.00 pack-year smoking history. He has never used smokeless tobacco. He reports current alcohol use of about 2.0 standard drinks of alcohol per week. He reports that he does not use drugs. Family History:  family history includes Diabetes in his mother; Heart Disease in his father and mother. Home Medications:  Prior to Admission medications    Medication Sig Start Date End Date Taking?  Authorizing Provider   spironolactone (ALDACTONE) 25 MG tablet TAKE 1 TABLET BY MOUTH DAILY 9/9/21  Yes ERVIN Celaya - CNP   carvedilol (COREG) 6.25 MG tablet TAKE 1 TABLET BY MOUTH 2 TIMES DAILY (WITH MEALS) 8/16/21  Yes Ashanti Washington MD   sacubitril-valsartan [facility]) 24-26 MG per tablet Take 1 tablet by mouth 2 times daily 7/7/21  Yes Ashanti Washington MD   warfarin (COUMADIN) 5 MG tablet TAKE 2 TO 2.5 TABLETS AS DIRECTED BY COUMADIN CLINIC 6/28/21  Yes Ashanti Washington MD   furosemide (LASIX) 40 MG tablet TAKE (1) TABLET DAILY 6/1/21  Yes Ashanti Washington MD   tiotropium (SPIRIVA HANDIHALER) 18 MCG inhalation capsule Inhale 1 capsule into the lungs daily 8/25/20  Yes Robby Renteria MD   atorvastatin (LIPITOR) 40 MG tablet Take 1 tablet by mouth nightly 6/23/20  Yes Mary Jane Omalley MD   aspirin 81 MG chewable tablet Take 1 tablet by mouth daily 2/15/20  Yes ERVIN Koroma CNP   traMADol (ULTRAM) 50 MG tablet Take 100 mg by mouth every 8 hours as needed for Pain. Yes Historical Provider, MD      Allergies:  Patient has no known allergies. Review of Systems:   · Constitutional: there has been no unanticipated weight loss. There's been no change in energy level, sleep pattern, or activity level. · Eyes: No visual changes or diplopia. No scleral icterus. · ENT: No Headaches, hearing loss or vertigo. No mouth sores or sore throat. · Cardiovascular: Reviewed in HPI  · Respiratory: No cough or wheezing, no sputum production. No hematemesis. · Gastrointestinal: No abdominal pain, appetite loss, blood in stools. No change in bowel or bladder habits. · Genitourinary: No dysuria, trouble voiding, or hematuria. · Musculoskeletal:  No gait disturbance, weakness or joint complaints. · Integumentary: No rash or pruritis. · Neurological: No headache, diplopia, change in muscle strength, numbness or tingling. No change in gait, balance, coordination, mood, affect, memory, mentation, behavior. · Psychiatric: No anxiety, no depression. · Endocrine: No malaise, fatigue or temperature intolerance. No excessive thirst, fluid intake, or urination. No tremor. · Hematologic/Lymphatic: No abnormal bruising or bleeding, blood clots or swollen lymph nodes. · Allergic/Immunologic: No nasal congestion or hives.     Physical Examination:    Vitals:    10/19/21 0931   BP: 104/70   Pulse: 75   Temp: 97.6 °F (36.4 °C)   SpO2: 98%        Wt Readings from Last 3 Encounters:   10/19/21 229 lb 6.4 oz (104.1 kg)   07/22/21 225 lb 12.8 oz (102.4 kg)   06/28/21 227 lb 12.8 oz (103.3 kg)       Constitutional and General Appearance: NAD   Respiratory:  · Normal excursion and expansion without use of accessory muscles  · Resp Auscultation: Normal breath sounds without dullness  Cardiovascular:  · The apical impulses not displaced  · Heart tones are crisp and normal  · Cervical veins are not engorged  · The carotid upstroke is normal in amplitude and contour without delay or bruit  · Normal S1S2, No S3, No Murmur  · Peripheral pulses are symmetrical and full  · There is no clubbing, cyanosis of the extremities. · No edema  · Femoral Arteries: 2+ and equal  · Pedal Pulses: 2+ and equal   Abdomen:  · No masses or tenderness  · Liver/Spleen: No Abnormalities Noted  Neurological/Psychiatric:  · Alert and oriented in all spheres  · Moves all extremities well  · Exhibits normal gait balance and coordination  · No abnormalities of mood, affect, memory, mentation, or behavior are noted        Skin: warm and dry  . asltcmp  Lab Results   Component Value Date    WBC 8.1 06/22/2021    HGB 15.6 06/22/2021    HCT 44.1 06/22/2021    MCV 93.1 06/22/2021     06/22/2021     Lab Results   Component Value Date     06/22/2021    K 4.4 06/22/2021     06/22/2021    CO2 25 06/22/2021    BUN 13 06/22/2021    CREATININE 0.8 (L) 06/22/2021    GLUCOSE 100 (H) 06/22/2021    CALCIUM 9.4 06/22/2021    PROT 7.1 11/11/2020    LABALBU 4.3 11/11/2020    BILITOT 0.5 11/11/2020    ALKPHOS 66 11/11/2020    AST 18 11/11/2020    ALT 33 11/11/2020    LABGLOM >60 06/22/2021    GFRAA >60 06/22/2021    AGRATIO 1.7 09/30/2020    GLOB 2.6 09/30/2020       Lab Results   Component Value Date    HDL 30 (L) 11/11/2020    HDL 30 (L) 03/20/2020     Lab Results   Component Value Date    LDLCALC 76 11/11/2020    LDLCALC 69 03/20/2020     Lab Results   Component Value Date    LABVLDL 38 11/11/2020    LABVLDL 21 03/20/2020     lipids 3/20/13   HDL 22 LDL 28    Assessment:     1.  Coronary artery disease involving native coronary artery of native heart without angina pectoris:  Most [address] 12/13/12 Severe 1V CAD with 99% mid-RCA stenosis with thrombus and DARLING-2 distal flow D2 with diffuse ostial-proximal 30% plaquing; EF 40%;  Successful aspiration thrombectomy/stenting of the mid-RCA s/p OLU. There are no concerning symptoms for angina currently. 2. Acute systolic (congestive) heart failure ([address]): Clinically compensated NYHA Class II and will continue current CHF medical regimen. See #3 below     3. Ischemic cardiomyopathy: Most recent limited ECHO 6/22/21 EF=15-20%. No definitive thrombus Will  continue CHF regimen of lasix, entresto, coreg, and aldactone. Cannot titrate further given lower BP. 4. Warfarin anticoagulation: Apical thrombus resolved and NO AC. See #3 above. Enrolled in [place]. [facility] cou[facility]. Given short amount of PAF on ICD check 3/21 and hx LV thrombus prior with severe LV dysfunction I believe he merits AC with coumadin indefinitely. 5.      Most recent lipids 11/11/20 I personally reviewed lab results in epic (see above) and discussed with patient. Well controlled except for lower HDL and will continue current medical regimen. Plan:  1. Meds reviewed. Refill due on Lasix   2. Limit salt intake and less than 64 oz per fluid daily  3. Weigh yourself daily. If greater than 3-4 pound weight gain in a day, take an extra dose that day. 4. Recommend that you get fasting labs. Orders were placed by Arabella Resendez NP  5. Follow up in 6 months    This note was scribed in the presence of Bassam Treviño MD by Hema Karimi RN. I, Dr. Magalys Lindo, personally performed the services described in this documentation, as scribed by the above signed scribe in my presence. It is both accurate and complete to my knowledge. I agree with the details independently gathered by the clinical support staff, while the remaining scribed note accurately describes my personal service to the patient. Cost of prescription medications and patient compliance have been reviewed with patient. All questions answered.      Thank you for allowing me to participate in the care of this shelley.    Charlene Garcia.  Meagan Abad M.D., [facility] [place]

## 2021-10-19 ENCOUNTER — OFFICE VISIT (OUTPATIENT)
Dept: CARDIOLOGY CLINIC | Age: 51
End: 2021-10-19
Payer: MEDICAID

## 2021-10-19 VITALS
SYSTOLIC BLOOD PRESSURE: 104 MMHG | OXYGEN SATURATION: 98 % | TEMPERATURE: 97.6 F | HEART RATE: 75 BPM | WEIGHT: 229.4 LBS | HEIGHT: 74 IN | BODY MASS INDEX: 29.44 KG/M2 | DIASTOLIC BLOOD PRESSURE: 70 MMHG

## 2021-10-19 DIAGNOSIS — Z95.810 PRESENCE OF CARDIAC RESYNCHRONIZATION THERAPY DEFIBRILLATOR (CRT-D): ICD-10-CM

## 2021-10-19 DIAGNOSIS — I51.7 CARDIOMEGALY: ICD-10-CM

## 2021-10-19 DIAGNOSIS — I50.22 CHRONIC SYSTOLIC (CONGESTIVE) HEART FAILURE (HCC): ICD-10-CM

## 2021-10-19 DIAGNOSIS — I25.10 CORONARY ARTERY DISEASE INVOLVING NATIVE CORONARY ARTERY OF NATIVE HEART WITHOUT ANGINA PECTORIS: ICD-10-CM

## 2021-10-19 DIAGNOSIS — Z72.0 CONTINUOUS TOBACCO ABUSE: ICD-10-CM

## 2021-10-19 DIAGNOSIS — I25.5 ISCHEMIC CARDIOMYOPATHY: Primary | ICD-10-CM

## 2021-10-19 DIAGNOSIS — I51.3 LEFT VENTRICULAR THROMBOSIS: Chronic | ICD-10-CM

## 2021-10-19 PROCEDURE — G8484 FLU IMMUNIZE NO ADMIN: HCPCS | Performed by: INTERNAL MEDICINE

## 2021-10-19 PROCEDURE — 3017F COLORECTAL CA SCREEN DOC REV: CPT | Performed by: INTERNAL MEDICINE

## 2021-10-19 PROCEDURE — 4004F PT TOBACCO SCREEN RCVD TLK: CPT | Performed by: INTERNAL MEDICINE

## 2021-10-19 PROCEDURE — 99214 OFFICE O/P EST MOD 30 MIN: CPT | Performed by: INTERNAL MEDICINE

## 2021-10-19 PROCEDURE — G8419 CALC BMI OUT NRM PARAM NOF/U: HCPCS | Performed by: INTERNAL MEDICINE

## 2021-10-19 PROCEDURE — G8427 DOCREV CUR MEDS BY ELIG CLIN: HCPCS | Performed by: INTERNAL MEDICINE

## 2021-10-19 RX ORDER — FUROSEMIDE 40 MG/1
TABLET ORAL
Qty: 90 TABLET | Refills: 3 | Status: SHIPPED | OUTPATIENT
Start: 2021-10-19 | End: 2022-02-28

## 2021-10-27 ENCOUNTER — TELEPHONE (OUTPATIENT)
Dept: CARDIOLOGY CLINIC | Age: 51
End: 2021-10-27

## 2021-10-27 NOTE — TELEPHONE ENCOUNTER
Pharmacy states they have faxed PA request twice. The previous PA was only good from 07/31/221 - 10/.     TY

## 2021-11-09 NOTE — TELEPHONE ENCOUNTER
Spoke to Osei Wolf @ [address] on 11/9/21 and she stated that the med was ran thru on 10/30/21 and it was approved.

## 2021-12-27 ENCOUNTER — NURSE ONLY (OUTPATIENT)
Dept: CARDIOLOGY CLINIC | Age: 51
End: 2021-12-27
Payer: MEDICAID

## 2021-12-27 DIAGNOSIS — Z95.810 PRESENCE OF CARDIAC RESYNCHRONIZATION THERAPY DEFIBRILLATOR (CRT-D): ICD-10-CM

## 2021-12-27 DIAGNOSIS — I25.5 ISCHEMIC CARDIOMYOPATHY: ICD-10-CM

## 2021-12-27 DIAGNOSIS — I50.21 ACUTE SYSTOLIC (CONGESTIVE) HEART FAILURE (HCC): ICD-10-CM

## 2021-12-27 PROCEDURE — 93295 DEV INTERROG REMOTE 1/2/MLT: CPT | Performed by: INTERNAL MEDICINE

## 2021-12-27 PROCEDURE — G2066 INTER DEVC REMOTE 30D: HCPCS | Performed by: INTERNAL MEDICINE

## 2021-12-27 PROCEDURE — 93297 REM INTERROG DEV EVAL ICPMS: CPT | Performed by: INTERNAL MEDICINE

## 2021-12-27 PROCEDURE — 93296 REM INTERROG EVL PM/IDS: CPT | Performed by: INTERNAL MEDICINE

## 2021-12-27 NOTE — PROGRESS NOTES
Remote transmission of pacemaker and/or ICD, or implanted heart monitor shows normal cardiac device function. Patient's last device interrogation was on 9/27/21. Estimated device longevity is 5.4 years. Patient has a history of ICM. Takes Coreg and coumadin. AP 7.9%   97%    P wave 3.85 mV  R wave >12 mV    Since last device interrogation, AMS events appear to be AT/AF vs. FFOS. 4 NSVT events recorded - longest x 8 seconds, however the majority of the event is under detected by the VT-1 zone (150 bpm). TI near baseline. EP MD to review. Patient is to follow up in 3 months either remotely or in clinic. Please see the [facility] report under the Cardiology tab for more detail.

## 2022-01-04 ENCOUNTER — TELEPHONE (OUTPATIENT)
Dept: CARDIOLOGY CLINIC | Age: 52
End: 2022-01-04

## 2022-01-04 NOTE — TELEPHONE ENCOUNTER
Called and LVM for patient to return call to the office. Needs in office device interrogation for programming changes per AGK - d/t increased ventricular rate event on last remote. Preferable to come in this week or early next week.

## 2022-01-04 NOTE — LETTER
[address] [facility] [phone]    Pacemaker/Defibrillator Clinic    01/05/22      [facility].      Dear Alma Damon    The [facility] has attempted to contact you several times. However we have been unsuccessful. Please contact the office at you earliest convenience. We would like to relay a message from your provider. Thank you.     [facility]

## 2022-01-18 RX ORDER — WARFARIN SODIUM 5 MG/1
TABLET ORAL
Qty: 75 TABLET | Refills: 3 | Status: SHIPPED | OUTPATIENT
Start: 2022-01-18 | End: 2022-09-06

## 2022-01-25 ENCOUNTER — HOSPITAL ENCOUNTER (OUTPATIENT)
Age: 52
Discharge: HOME | End: 2022-01-25
Payer: MEDICAID

## 2022-01-25 LAB
ALBUMIN SERPL-MCNC: 4.8 G/DL (ref 3.4–5)
ALP BLD-CCNC: 73 U/L (ref 40–129)
ALT SERPL-CCNC: 17 U/L (ref 10–40)
ANION GAP SERPL CALCULATED.3IONS-SCNC: 14 MMOL/L (ref 3–16)
AST SERPL-CCNC: 14 U/L (ref 15–37)
BILIRUB SERPL-MCNC: 0.8 MG/DL (ref 0–1)
BILIRUBIN DIRECT: <0.2 MG/DL (ref 0–0.3)
BILIRUBIN, INDIRECT: ABNORMAL MG/DL (ref 0–1)
BUN BLDV-MCNC: 16 MG/DL (ref 7–20)
CALCIUM SERPL-MCNC: 10 MG/DL (ref 8.3–10.6)
CHLORIDE BLD-SCNC: 102 MMOL/L (ref 99–110)
CO2: 21 MMOL/L (ref 21–32)
CREAT SERPL-MCNC: 0.7 MG/DL (ref 0.9–1.3)
GFR AFRICAN AMERICAN: >60
GFR NON-AFRICAN AMERICAN: >60
GLUCOSE BLD-MCNC: 142 MG/DL (ref 70–99)
POTASSIUM SERPL-SCNC: 4.1 MMOL/L (ref 3.5–5.1)
PRO-BNP: 1078 PG/ML (ref 0–124)
SODIUM BLD-SCNC: 137 MMOL/L (ref 136–145)
TOTAL PROTEIN: 8.6 G/DL (ref 6.4–8.2)

## 2022-01-25 PROCEDURE — 36415 COLL VENOUS BLD VENIPUNCTURE: CPT

## 2022-01-25 PROCEDURE — 80048 BASIC METABOLIC PNL TOTAL CA: CPT

## 2022-01-25 PROCEDURE — 80076 HEPATIC FUNCTION PANEL: CPT

## 2022-01-25 PROCEDURE — 80061 LIPID PANEL: CPT

## 2022-01-25 PROCEDURE — 83880 ASSAY OF NATRIURETIC PEPTIDE: CPT

## 2022-01-25 NOTE — RESULT ENCOUNTER NOTE
Signed            LMOVM with bw results and requested he call us back to let us know his symptoms and weight n umbjerilyn.

## 2022-01-25 NOTE — RESULT ENCOUNTER NOTE
CATINA with bw results and requested he call us back to let us know his symptoms and weight n abbey.

## 2022-01-26 LAB
CHOLESTEROL, TOTAL: 220 MG/DL (ref 0–199)
HDLC SERPL-MCNC: 35 MG/DL (ref 40–60)
LDL CHOLESTEROL CALCULATED: 163 MG/DL
TRIGL SERPL-MCNC: 109 MG/DL (ref 0–150)
VLDLC SERPL CALC-MCNC: 22 MG/DL

## 2022-01-27 ENCOUNTER — TELEPHONE (OUTPATIENT)
Dept: CARDIOLOGY CLINIC | Age: 52
End: 2022-01-27

## 2022-01-27 RX ORDER — ATORVASTATIN CALCIUM 40 MG/1
40 TABLET, FILM COATED ORAL NIGHTLY
Qty: 30 TABLET | Refills: 5 | Status: SHIPPED | OUTPATIENT
Start: 2022-01-27 | End: 2022-04-13 | Stop reason: SDUPTHER

## 2022-01-27 NOTE — TELEPHONE ENCOUNTER
Spoke to pt and relayed [facility]'S message regarding labs done on 01/25/2022. Pt's current weight is 222 lbs. Last week pt weighed 213 lbs. Pt is currently taking Lasix 40 mg once daily. On and off SOB. Denies any other symptoms. Pt has not been taking the Lipitor 40 mg because \"the prescription ran out\". Please send new Lipitor prescription to [address] [place] [address] @ [phone].

## 2022-01-27 NOTE — TELEPHONE ENCOUNTER
Lipitor refilled. Repeat lipids/LFTs in 2 months. Have him take an extra lasix for 3 days then resume normal dosing. Thanks!

## 2022-02-28 RX ORDER — FUROSEMIDE 40 MG/1
TABLET ORAL
Qty: 30 TABLET | Refills: 1 | Status: SHIPPED | OUTPATIENT
Start: 2022-02-28 | End: 2022-04-13 | Stop reason: SDUPTHER

## 2022-03-08 ENCOUNTER — APPOINTMENT (OUTPATIENT)
Dept: CT IMAGING | Age: 52
End: 2022-03-08
Payer: MEDICAID

## 2022-03-08 ENCOUNTER — HOSPITAL ENCOUNTER (EMERGENCY)
Age: 52
Discharge: HOME | End: 2022-03-08
Attending: EMERGENCY MEDICINE
Payer: MEDICAID

## 2022-03-08 VITALS
DIASTOLIC BLOOD PRESSURE: 89 MMHG | WEIGHT: 220 LBS | TEMPERATURE: 97.7 F | OXYGEN SATURATION: 93 % | RESPIRATION RATE: 20 BRPM | HEIGHT: 74 IN | HEART RATE: 67 BPM | BODY MASS INDEX: 28.23 KG/M2 | SYSTOLIC BLOOD PRESSURE: 108 MMHG

## 2022-03-08 DIAGNOSIS — R10.9 ACUTE LEFT FLANK PAIN: ICD-10-CM

## 2022-03-08 DIAGNOSIS — R10.32 LEFT LOWER QUADRANT ABDOMINAL PAIN: Primary | ICD-10-CM

## 2022-03-08 LAB
A/G RATIO: 1.4 (ref 1.1–2.2)
ALBUMIN SERPL-MCNC: 4.5 G/DL (ref 3.4–5)
ALP BLD-CCNC: 76 U/L (ref 40–129)
ALT SERPL-CCNC: 19 U/L (ref 10–40)
ANION GAP SERPL CALCULATED.3IONS-SCNC: 11 MMOL/L (ref 3–16)
AST SERPL-CCNC: 17 U/L (ref 15–37)
BACTERIA: ABNORMAL /HPF
BASOPHILS ABSOLUTE: 0.1 K/UL (ref 0–0.2)
BASOPHILS RELATIVE PERCENT: 0.6 %
BILIRUB SERPL-MCNC: 0.5 MG/DL (ref 0–1)
BILIRUBIN URINE: NEGATIVE
BLOOD, URINE: ABNORMAL
BUN BLDV-MCNC: 9 MG/DL (ref 7–20)
CALCIUM SERPL-MCNC: 9.4 MG/DL (ref 8.3–10.6)
CHLORIDE BLD-SCNC: 100 MMOL/L (ref 99–110)
CLARITY: CLEAR
CO2: 25 MMOL/L (ref 21–32)
COLOR: YELLOW
CREAT SERPL-MCNC: 0.8 MG/DL (ref 0.9–1.3)
EOSINOPHILS ABSOLUTE: 0.1 K/UL (ref 0–0.6)
EOSINOPHILS RELATIVE PERCENT: 1.5 %
EPITHELIAL CELLS, UA: ABNORMAL /HPF (ref 0–5)
GFR AFRICAN AMERICAN: >60
GFR NON-AFRICAN AMERICAN: >60
GLUCOSE BLD-MCNC: 136 MG/DL (ref 70–99)
GLUCOSE URINE: NEGATIVE MG/DL
HCT VFR BLD CALC: 45.6 % (ref 40.5–52.5)
HEMOGLOBIN: 15.9 G/DL (ref 13.5–17.5)
KETONES, URINE: ABNORMAL MG/DL
LEUKOCYTE ESTERASE, URINE: NEGATIVE
LIPASE: 23 U/L (ref 13–60)
LYMPHOCYTES ABSOLUTE: 1.7 K/UL (ref 1–5.1)
LYMPHOCYTES RELATIVE PERCENT: 19.1 %
MCH RBC QN AUTO: 32 PG (ref 26–34)
MCHC RBC AUTO-ENTMCNC: 34.9 G/DL (ref 31–36)
MCV RBC AUTO: 91.6 FL (ref 80–100)
MICROSCOPIC EXAMINATION: YES
MONOCYTES ABSOLUTE: 0.7 K/UL (ref 0–1.3)
MONOCYTES RELATIVE PERCENT: 7.8 %
MUCUS: ABNORMAL /LPF
NEUTROPHILS ABSOLUTE: 6.2 K/UL (ref 1.7–7.7)
NEUTROPHILS RELATIVE PERCENT: 71 %
NITRITE, URINE: NEGATIVE
PDW BLD-RTO: 13.2 % (ref 12.4–15.4)
PH UA: 5 (ref 5–8)
PLATELET # BLD: 179 K/UL (ref 135–450)
PMV BLD AUTO: 9.7 FL (ref 5–10.5)
POTASSIUM REFLEX MAGNESIUM: 3.8 MMOL/L (ref 3.5–5.1)
PROTEIN UA: ABNORMAL MG/DL
RBC # BLD: 4.97 M/UL (ref 4.2–5.9)
RBC UA: ABNORMAL /HPF (ref 0–4)
SODIUM BLD-SCNC: 136 MMOL/L (ref 136–145)
SPECIFIC GRAVITY UA: >=1.03 (ref 1–1.03)
TOTAL PROTEIN: 7.8 G/DL (ref 6.4–8.2)
URINE REFLEX TO CULTURE: ABNORMAL
URINE TYPE: ABNORMAL
UROBILINOGEN, URINE: 0.2 E.U./DL
WBC # BLD: 8.7 K/UL (ref 4–11)
WBC UA: ABNORMAL /HPF (ref 0–5)

## 2022-03-08 PROCEDURE — 6360000004 HC RX CONTRAST MEDICATION: Performed by: EMERGENCY MEDICINE

## 2022-03-08 PROCEDURE — 85025 COMPLETE CBC W/AUTO DIFF WBC: CPT

## 2022-03-08 PROCEDURE — 99285 EMERGENCY DEPT VISIT HI MDM: CPT

## 2022-03-08 PROCEDURE — 36415 COLL VENOUS BLD VENIPUNCTURE: CPT

## 2022-03-08 PROCEDURE — 6360000002 HC RX W HCPCS: Performed by: EMERGENCY MEDICINE

## 2022-03-08 PROCEDURE — 81001 URINALYSIS AUTO W/SCOPE: CPT

## 2022-03-08 PROCEDURE — 83690 ASSAY OF LIPASE: CPT

## 2022-03-08 PROCEDURE — 80053 COMPREHEN METABOLIC PANEL: CPT

## 2022-03-08 PROCEDURE — 96375 TX/PRO/DX INJ NEW DRUG ADDON: CPT

## 2022-03-08 PROCEDURE — 96374 THER/PROPH/DIAG INJ IV PUSH: CPT

## 2022-03-08 PROCEDURE — 74177 CT ABD & PELVIS W/CONTRAST: CPT

## 2022-03-08 RX ORDER — MORPHINE SULFATE 4 MG/ML
4 INJECTION, SOLUTION INTRAMUSCULAR; INTRAVENOUS ONCE
Status: COMPLETED | OUTPATIENT
Start: 2022-03-08 | End: 2022-03-08

## 2022-03-08 RX ORDER — KETOROLAC TROMETHAMINE 30 MG/ML
15 INJECTION, SOLUTION INTRAMUSCULAR; INTRAVENOUS ONCE
Status: COMPLETED | OUTPATIENT
Start: 2022-03-08 | End: 2022-03-08

## 2022-03-08 RX ORDER — ONDANSETRON 2 MG/ML
4 INJECTION INTRAMUSCULAR; INTRAVENOUS ONCE
Status: COMPLETED | OUTPATIENT
Start: 2022-03-08 | End: 2022-03-08

## 2022-03-08 RX ORDER — CYCLOBENZAPRINE HCL 5 MG
5 TABLET ORAL 2 TIMES DAILY PRN
Qty: 10 TABLET | Refills: 0 | Status: SHIPPED | OUTPATIENT
Start: 2022-03-08 | End: 2022-03-18

## 2022-03-08 RX ADMIN — IOPAMIDOL 75 ML: 755 INJECTION, SOLUTION INTRAVENOUS at 09:42

## 2022-03-08 RX ADMIN — KETOROLAC TROMETHAMINE 15 MG: 30 INJECTION, SOLUTION INTRAMUSCULAR; INTRAVENOUS at 10:26

## 2022-03-08 RX ADMIN — MORPHINE SULFATE 4 MG: 4 INJECTION, SOLUTION INTRAMUSCULAR; INTRAVENOUS at 09:11

## 2022-03-08 RX ADMIN — ONDANSETRON HYDROCHLORIDE 4 MG: 2 INJECTION, SOLUTION INTRAMUSCULAR; INTRAVENOUS at 09:10

## 2022-03-08 ASSESSMENT — PAIN DESCRIPTION - LOCATION
LOCATION: ABDOMEN;BACK
LOCATION: ABDOMEN;BACK
LOCATION: ABDOMEN

## 2022-03-08 ASSESSMENT — PAIN SCALES - GENERAL
PAINLEVEL_OUTOF10: 6

## 2022-03-08 ASSESSMENT — PAIN DESCRIPTION - FREQUENCY
FREQUENCY: CONTINUOUS

## 2022-03-08 ASSESSMENT — PAIN DESCRIPTION - PAIN TYPE
TYPE: ACUTE PAIN

## 2022-03-08 ASSESSMENT — PAIN DESCRIPTION - ORIENTATION
ORIENTATION: LEFT

## 2022-03-08 ASSESSMENT — PAIN DESCRIPTION - DESCRIPTORS
DESCRIPTORS: SHARP;ACHING
DESCRIPTORS: ACHING;CONSTANT
DESCRIPTORS: ACHING;SHARP

## 2022-03-08 ASSESSMENT — PAIN DESCRIPTION - PROGRESSION
CLINICAL_PROGRESSION: NOT CHANGED
CLINICAL_PROGRESSION: GRADUALLY WORSENING

## 2022-03-08 ASSESSMENT — PAIN - FUNCTIONAL ASSESSMENT: PAIN_FUNCTIONAL_ASSESSMENT: 0-10

## 2022-03-08 NOTE — ED NOTES
Pt discharge instructions reviewed, medications and f/u care reviewed, pt verbalized understanding.      Sami Fire  03/08/22 1037
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
## 2022-03-08 NOTE — ED NOTES
Pt presents to ER with c/o abdominal pain that radiates across stomach horizontally to spine, causing \"pressure\" on spine. Abdomen soft and non-tender. BS + x 4 quads. Denies any nausea, vomiting or diarrhea. VSS.      [facility]  03/08/22 [id number]

## 2022-03-28 ENCOUNTER — TELEPHONE (OUTPATIENT)
Dept: CARDIOLOGY CLINIC | Age: 52
End: 2022-03-28

## 2022-03-28 ENCOUNTER — NURSE ONLY (OUTPATIENT)
Dept: CARDIOLOGY CLINIC | Age: 52
End: 2022-03-28
Payer: MEDICAID

## 2022-03-28 DIAGNOSIS — I50.21 ACUTE SYSTOLIC (CONGESTIVE) HEART FAILURE (HCC): ICD-10-CM

## 2022-03-28 DIAGNOSIS — I50.22 CHRONIC SYSTOLIC (CONGESTIVE) HEART FAILURE (HCC): ICD-10-CM

## 2022-03-28 DIAGNOSIS — I50.43 CHF (CONGESTIVE HEART FAILURE), NYHA CLASS I, ACUTE ON CHRONIC, COMBINED (HCC): ICD-10-CM

## 2022-03-28 DIAGNOSIS — I25.5 ISCHEMIC CARDIOMYOPATHY: ICD-10-CM

## 2022-03-28 DIAGNOSIS — I51.7 CARDIOMEGALY: ICD-10-CM

## 2022-03-28 DIAGNOSIS — Z95.810 PRESENCE OF CARDIAC RESYNCHRONIZATION THERAPY DEFIBRILLATOR (CRT-D): ICD-10-CM

## 2022-03-28 PROCEDURE — 93296 REM INTERROG EVL PM/IDS: CPT | Performed by: INTERNAL MEDICINE

## 2022-03-28 PROCEDURE — 93295 DEV INTERROG REMOTE 1/2/MLT: CPT | Performed by: INTERNAL MEDICINE

## 2022-03-28 NOTE — LETTER
[address] [facility] [phone]    Pacemaker/Defibrillator Clinic  4/4/22      [facility].      Dear Kay García    [facility] has attempted to contact you several times. However we have been unsuccessful. Please contact the office at you earliest convenience. We would like to relay a message from your provider. Thank you.     [facility]

## 2022-03-28 NOTE — TELEPHONE ENCOUNTER
l/s 1/5/22 to call office, needs programming changes per [facility] - Change VT-1 to 490 ms/122 bpm w/ ATP on. Pt never made appt, please reach out to him again. Needs f/u w/ [facility] and device check for programming changes.

## 2022-03-28 NOTE — PROGRESS NOTES
froilanue-npmarilee. On 3/5/22 he had 7 days of decreased impedance. corvue @/ above reference since 3/13/522. Remote transmission received for patients CRT-D. Transmission shows normal sensing and pacing function. EP physician will review. See interrogation under the cardiology tab in the [address] [place] [address] [address] field for more details. Will continue to monitor remotely. hx NSVT (coreg)  And AT/TWOS. He takes coumadin for LV thrombus. BP 97%. PVC 1.8%. AT/AF burden < 1%. AMS recordings show FFOS. NSVT recorded. l/s 1/5/22 to call office, needs programming changes per [facility] - Change VT-1 to 490 ms/122 bpm w/ ATP on. Encounter sent to staff to call pt to schedule ov/device check.

## 2022-03-29 NOTE — TELEPHONE ENCOUNTER
3/29/22 LM at [phone] informing pt to call back. Pt is established with [facility]. Please schedule pt with [facility] and device clinic for next available, please check for cancellations when searching for next date.

## 2022-04-01 PROCEDURE — 93297 REM INTERROG DEV EVAL ICPMS: CPT | Performed by: INTERNAL MEDICINE

## 2022-04-09 RX ORDER — TIOTROPIUM BROMIDE 18 UG/1
CAPSULE ORAL; RESPIRATORY (INHALATION)
Qty: 30 CAPSULE | Refills: 5 | Status: SHIPPED | OUTPATIENT
Start: 2022-04-09

## 2022-04-13 ENCOUNTER — OFFICE VISIT (OUTPATIENT)
Dept: CARDIOLOGY CLINIC | Age: 52
End: 2022-04-13
Payer: MEDICAID

## 2022-04-13 VITALS
DIASTOLIC BLOOD PRESSURE: 74 MMHG | BODY MASS INDEX: 29.39 KG/M2 | HEART RATE: 83 BPM | HEIGHT: 74 IN | SYSTOLIC BLOOD PRESSURE: 108 MMHG | WEIGHT: 229 LBS | OXYGEN SATURATION: 96 %

## 2022-04-13 DIAGNOSIS — Z72.0 TOBACCO ABUSE: ICD-10-CM

## 2022-04-13 DIAGNOSIS — I51.3 LEFT VENTRICULAR THROMBOSIS: Chronic | ICD-10-CM

## 2022-04-13 DIAGNOSIS — I51.7 CARDIOMEGALY: ICD-10-CM

## 2022-04-13 DIAGNOSIS — I25.10 CORONARY ARTERY DISEASE INVOLVING NATIVE CORONARY ARTERY OF NATIVE HEART WITHOUT ANGINA PECTORIS: ICD-10-CM

## 2022-04-13 DIAGNOSIS — I50.21 ACUTE SYSTOLIC (CONGESTIVE) HEART FAILURE (HCC): Primary | ICD-10-CM

## 2022-04-13 DIAGNOSIS — Z79.899 MEDICATION MANAGEMENT: ICD-10-CM

## 2022-04-13 DIAGNOSIS — I50.22 CHRONIC SYSTOLIC (CONGESTIVE) HEART FAILURE (HCC): ICD-10-CM

## 2022-04-13 DIAGNOSIS — R06.02 SOB (SHORTNESS OF BREATH): ICD-10-CM

## 2022-04-13 DIAGNOSIS — I25.5 ISCHEMIC CARDIOMYOPATHY: ICD-10-CM

## 2022-04-13 PROCEDURE — 93000 ELECTROCARDIOGRAM COMPLETE: CPT | Performed by: INTERNAL MEDICINE

## 2022-04-13 PROCEDURE — 99214 OFFICE O/P EST MOD 30 MIN: CPT | Performed by: INTERNAL MEDICINE

## 2022-04-13 PROCEDURE — 4004F PT TOBACCO SCREEN RCVD TLK: CPT | Performed by: INTERNAL MEDICINE

## 2022-04-13 PROCEDURE — G8419 CALC BMI OUT NRM PARAM NOF/U: HCPCS | Performed by: INTERNAL MEDICINE

## 2022-04-13 PROCEDURE — 3017F COLORECTAL CA SCREEN DOC REV: CPT | Performed by: INTERNAL MEDICINE

## 2022-04-13 PROCEDURE — G8427 DOCREV CUR MEDS BY ELIG CLIN: HCPCS | Performed by: INTERNAL MEDICINE

## 2022-04-13 RX ORDER — CARVEDILOL 6.25 MG/1
6.25 TABLET ORAL 2 TIMES DAILY WITH MEALS
Qty: 60 TABLET | Refills: 6 | Status: SHIPPED | OUTPATIENT
Start: 2022-04-13

## 2022-04-13 RX ORDER — SPIRONOLACTONE 25 MG/1
25 TABLET ORAL DAILY
Qty: 30 TABLET | Refills: 6 | Status: SHIPPED | OUTPATIENT
Start: 2022-04-13 | End: 2022-09-13

## 2022-04-13 RX ORDER — FUROSEMIDE 40 MG/1
40 TABLET ORAL 2 TIMES DAILY
Qty: 60 TABLET | Refills: 6 | Status: SHIPPED | OUTPATIENT
Start: 2022-04-13

## 2022-04-13 RX ORDER — ATORVASTATIN CALCIUM 40 MG/1
40 TABLET, FILM COATED ORAL NIGHTLY
Qty: 30 TABLET | Refills: 6 | Status: SHIPPED | OUTPATIENT
Start: 2022-04-13

## 2022-04-13 NOTE — PATIENT INSTRUCTIONS
Plan:  1. I would like to switch you from coumadin to Eliquis 5 mg twice a day              -do not start the Eliquis until I get the blood results for your INR and know what your level is. 2. It is ok if your blood pressure goes up when you are up and active and then it comes down after you sit. 3. Start taking Lasix 40 mg twice a day              -take one pill in the morning and then take one pill in around 3:00-4:00 in the afternoon     Call [facility] in [facility] to establish primary care doctor              [phone]  4. I would like for you to have blood work done tomorrow              -you will need to be fasting for this   5. It is very important that you take your medication every day              -you can get a pill organizer for the week to help keep your pills straight  6. Limit your salt intake to less than 2 grams a day  7. Limit fluid to less than 64 oz or 2 liters a day  8.  Weight your self every day              -if you gain more than 3-4 pounds in a day call my office and let me know.     Follow up with Ruthann Kelly in one month     Follow up with me in two months

## 2022-04-13 NOTE — PROGRESS NOTES
[address]   Cardiac Followup    Referring Provider:  No primary care provider on file. Chief Complaint   Patient presents with    6 Month Follow-Up    Congestive Heart Failure    Coronary Artery Disease    Cardiomyopathy    Shortness of Breath      Subjective: Patient is being seen today for cardiology follow up; c/o SOB today    Past Medical History:  Lyle Tapia a 45 y. o. patient admitted [facility] 2/12/20 with c/o SOB. He has PMH small inferior MI in 2012 with OLU to RCA, severe ICM EF<20%, s/p BiV-ICD per Dr. Kennedy Gottlieb 12/20, apical thrombus dx 2/20 on warfarin (follows [facility]), hx PAF, HLD, and chronic tobacco abuse.  Most [address] 12/13/12 Severe 1V CAD with 99% mid-RCA stenosis with thrombus and DARLING-2 distal flow D2 with diffuse ostial-proximal 30% plaquing; EF 40%; s/p thrombectomy/stent mid-RCA.       Admitted 2/20 with ischemic CM, acute sysolic CHF, and apical thrombus. Most recent Lexiscan myoview stress test 02/13/20 showed severely reduced LVEF 27%; Fixed inferoapical and lateral defects c/w scar showed no ischemia. No LHC recommended with only med mgt. Started on CHF med regimen plus coumadin for [facility] of apical thrombus. Most recent limited ECHO 6/22/21 EF=15-20% (EF=25-30% 11/20; EF=20-25% in 2/20; EF=55-60% in 12/12) No definitive thrombus. Most recent lower extremity FOREST 6/22/21 No significant evidence of large vessel arterial occlusive disease of the  lower extremities. Most recent device check 10/19/21 Transmission shows normal sensing and pacing function. BP 97%. PVC 1.8%. AT/AF burden < 1%. AMS recordings show FFOS. NSVT recorded. Today, he reports noncompliance with INR checks. Last INR check  6/7/21=2.5. He c/o SOB at rest and exertion. Started taking 2 lasix pills last week and unable to work due to SOB. He hasn't taken his spironolactone since 12/21. Found pill bottle under sink and had forgotten it was there. Reports memory issues.  He is not compliant with any of his medications. He was not on Lipitor when his cholesterol was checked. Still smoking 1ppd. Weight today is 229# which is steady from 10/19/21 visit    Patient is vaccinated against Covid. Chioma Ok 2/2    Past Medical History:   has a past medical history of CAD (coronary artery disease), Heart attack ([address] and Neck pain. Surgical History:   has a past surgical history that includes cervical fusion and Cardiac surgery. Social History:   reports that he has been smoking cigarettes. He has a 7.00 pack-year smoking history. He has never used smokeless tobacco. He reports previous alcohol use. He reports that he does not use drugs. Family History:  family history includes Diabetes in his mother; Heart Disease in his father and mother. Home Medications:  Prior to Admission medications    Medication Sig Start Date End Date Taking? Authorizing Provider   Pepe Gary 18 MCG inhalation capsule USE 1 CAPSULE IN HANDIHALER ONCE DAILY 4/9/22  Yes Rick Batista MD   furosemide (LASIX) 40 MG tablet TAKE (1) TABLET DAILY 2/28/22  Yes Alina Busch MD   atorvastatin (LIPITOR) 40 MG tablet Take 1 tablet by mouth nightly 1/27/22  Yes ERVIN Paredes CNP   warfarin (COUMADIN) 5 MG tablet TAKE 2 TO 2.5 TABLETS AS DIRECTED BY COUMADIN CLINIC 1/18/22  Yes Alina Busch MD   spironolactone (ALDACTONE) 25 MG tablet TAKE 1 TABLET BY MOUTH DAILY 9/9/21  Yes ERVIN Paredes CNP   carvedilol (COREG) 6.25 MG tablet TAKE 1 TABLET BY MOUTH 2 TIMES DAILY (WITH MEALS) 8/16/21  Yes Alina Busch MD   sacubitril-valsartan [facility]) 24-26 MG per tablet Take 1 tablet by mouth 2 times daily 7/7/21  Yes Alina Busch MD   aspirin 81 MG chewable tablet Take 1 tablet by mouth daily 2/15/20  Yes ERVIN Mendez CNP   traMADol (ULTRAM) 50 MG tablet Take 100 mg by mouth every 8 hours as needed for Pain.     Yes Historical Provider, MD      Allergies:  Statins     Review of Systems:   · Constitutional: there has been no unanticipated weight loss. There's been no change in energy level, sleep pattern, or activity level. · Eyes: No visual changes or diplopia. No scleral icterus. · ENT: No Headaches, hearing loss or vertigo. No mouth sores or sore throat. · Cardiovascular: Reviewed in HPI  · Respiratory: No cough or wheezing, no sputum production. No hematemesis. · Gastrointestinal: No abdominal pain, appetite loss, blood in stools. No change in bowel or bladder habits. · Genitourinary: No dysuria, trouble voiding, or hematuria. · Musculoskeletal:  No gait disturbance, weakness or joint complaints. · Integumentary: No rash or pruritis. · Neurological: No headache, diplopia, change in muscle strength, numbness or tingling. No change in gait, balance, coordination, mood, affect, memory, mentation, behavior. · Psychiatric: No anxiety, no depression. · Endocrine: No malaise, fatigue or temperature intolerance. No excessive thirst, fluid intake, or urination. No tremor. · Hematologic/Lymphatic: No abnormal bruising or bleeding, blood clots or swollen lymph nodes. · Allergic/Immunologic: No nasal congestion or hives. Physical Examination:    Vitals:    04/13/22 1428   BP: 108/74   Pulse: 83   SpO2: 96%        Wt Readings from Last 3 Encounters:   04/13/22 229 lb (103.9 kg)   03/08/22 220 lb (99.8 kg)   10/19/21 229 lb 6.4 oz (104.1 kg)       Constitutional and General Appearance: NAD   Respiratory:  · Normal excursion and expansion without use of accessory muscles  · Resp Auscultation: Clear, no crackles or wheezes   Cardiovascular:  · The apical impulses not displaced  · Heart tones are crisp and normal  · Cervical veins are not engorged  · The carotid upstroke is normal in amplitude and contour without delay or bruit  · Normal S1S2, No S3, No Murmur  · Peripheral pulses are symmetrical and full  · There is no clubbing, cyanosis of the extremities.   · No edema  · Femoral Arteries: 2+ and equal  · Pedal Pulses: 2+ and equal   Abdomen:  · No masses or tenderness  · Liver/Spleen: No Abnormalities Noted  Neurological/Psychiatric:  · Alert and oriented in all spheres  · Moves all extremities well  · Exhibits normal gait balance and coordination  · No abnormalities of mood, affect, memory, mentation, or behavior are noted        Skin: warm and dry  . asltcmp  Lab Results   Component Value Date    WBC 8.7 03/08/2022    HGB 15.9 03/08/2022    HCT 45.6 03/08/2022    MCV 91.6 03/08/2022     03/08/2022     Lab Results   Component Value Date     03/08/2022    K 3.8 03/08/2022     03/08/2022    CO2 25 03/08/2022    BUN 9 03/08/2022    CREATININE 0.8 (L) 03/08/2022    GLUCOSE 136 (H) 03/08/2022    CALCIUM 9.4 03/08/2022    PROT 7.8 03/08/2022    LABALBU 4.5 03/08/2022    BILITOT 0.5 03/08/2022    ALKPHOS 76 03/08/2022    AST 17 03/08/2022    ALT 19 03/08/2022    LABGLOM >60 03/08/2022    GFRAA >60 03/08/2022    AGRATIO 1.4 03/08/2022    GLOB 2.6 09/30/2020       Lab Results   Component Value Date    HDL 35 (L) 01/25/2022    HDL 30 (L) 11/11/2020    HDL 30 (L) 03/20/2020     Lab Results   Component Value Date    LDLCALC 163 (H) 01/25/2022    LDLCALC 76 11/11/2020    LDLCALC 69 03/20/2020     Lab Results   Component Value Date    LABVLDL 22 01/25/2022    LABVLDL 38 11/11/2020    LABVLDL 21 03/20/2020     lipids 3/20/13   HDL 22 LDL 28    I have personally reviewed all labs including lipids 1/25/22    Assessment:     1. Coronary artery disease involving native coronary artery of native heart without angina pectoris:  Most [address] 12/13/12 Severe 1V CAD with 99% mid-RCA stenosis with thrombus; S/P thrombectomy/stenting of the mid-RCA s/p OLU. There are no concerning symptoms for angina currently. 2. Acute systolic (congestive) heart failure ([address]): Clinically decompensated NYHA Class III possibly due to medication noncompliance.  Strongly encouraged taking meds properly. I will increase lasix to 40mg BID and he will continue aldactone 25mg qd (off med for months). If not able to feel better on med mgt I will need to consider adding SGLT inhibitor and possible referral to tertiary [facility] center for LVAD and/or transplant evaluation. Noncompliance is an issue. See #3 below     3. Ischemic cardiomyopathy: Most recent limited ECHO 6/22/21 EF=15-20%. Will  continue CHF regimen of lasix, entresto, coreg, and aldactone. Cannot titrate further given lower BP. 4. Warfarin anticoagulation: Apical thrombus resolved and NO AC. See #3 above. Enrolled in [place]. [facility] cou[facility]. Given short amount of PAF on ICD check 3/21 and hx LV thrombus prior with severe LV dysfunction I believe he merits AC indefinitely. Noncompliance issues and I will switch to DOAC Eliquis. .       5. Most recent lipids 1/25/22 I personally reviewed lab results in epic (see above). Not at goal and NOT  taking lipitor 40mg at the time. He is back on statin now. Plan:  1. I would like to switch you from coumadin to Eliquis 5 mg twice a day   -do not start the Eliquis until I get the blood results for your INR and know what your level is. 2. It is ok if your blood pressure goes up when you are up and active and then it comes down after you sit. 3. Start taking Lasix 40 mg twice a day   -take one pill in the morning and then take one pill in around 3:00-4:00 in the afternoon    Call [facility] in [facility] to establish primary care doctor  He c/o memory issues and needs evaluation. [phone]  4. I would like for you to have blood work done tomorrow   -you will need to be fasting for this   5. It is very important that you take your medication every day   -you can get a pill organizer for the week to help keep your pills straight  6. Limit your salt intake to less than 2 grams a day  7. Limit fluid to less than 64 oz or 2 liters a day  8.  Weight your self every day   -if you gain more than 3-4 pounds in a day call my office and let me know. Follow up with Eve Hamilton in one month. Follow up with me in two months    This note is scribed in the presence of Dr. Tiera Crane. Jovan Tello by Maureen Stoner RN.    I, Dr. David You, personally performed the services described in this documentation, as scribed by the above signed scribe in my presence. It is both accurate and complete to my knowledge. I agree with the details independently gathered by the clinical support staff, while the remaining scribed note accurately describes my personal service to the patient. Cost of prescription medications and patient compliance have been reviewed with patient. All questions answered. Thank you for allowing me to participate in the care of this individual.    Tiera Crane.  Jovan Tello M.D., [facility]

## 2022-05-06 ENCOUNTER — HOSPITAL ENCOUNTER (OUTPATIENT)
Age: 52
Discharge: HOME | End: 2022-05-06
Payer: MEDICAID

## 2022-05-06 DIAGNOSIS — Z79.899 MEDICATION MANAGEMENT: ICD-10-CM

## 2022-05-06 LAB
A/G RATIO: 1.4 (ref 1.1–2.2)
ALBUMIN SERPL-MCNC: 4.7 G/DL (ref 3.4–5)
ALP BLD-CCNC: 82 U/L (ref 40–129)
ALT SERPL-CCNC: 22 U/L (ref 10–40)
ANION GAP SERPL CALCULATED.3IONS-SCNC: 10 MMOL/L (ref 3–16)
AST SERPL-CCNC: 15 U/L (ref 15–37)
BASOPHILS ABSOLUTE: 0.1 K/UL (ref 0–0.2)
BASOPHILS RELATIVE PERCENT: 1 %
BILIRUB SERPL-MCNC: 0.5 MG/DL (ref 0–1)
BUN BLDV-MCNC: 17 MG/DL (ref 7–20)
CALCIUM SERPL-MCNC: 10.1 MG/DL (ref 8.3–10.6)
CHLORIDE BLD-SCNC: 99 MMOL/L (ref 99–110)
CO2: 29 MMOL/L (ref 21–32)
CREAT SERPL-MCNC: 0.8 MG/DL (ref 0.9–1.3)
EOSINOPHILS ABSOLUTE: 0.2 K/UL (ref 0–0.6)
EOSINOPHILS RELATIVE PERCENT: 1.9 %
GFR AFRICAN AMERICAN: >60
GFR NON-AFRICAN AMERICAN: >60
GLUCOSE BLD-MCNC: 124 MG/DL (ref 70–99)
HCT VFR BLD CALC: 46.3 % (ref 40.5–52.5)
HEMOGLOBIN: 16.1 G/DL (ref 13.5–17.5)
INR BLD: 1.45 (ref 0.88–1.12)
LYMPHOCYTES ABSOLUTE: 2 K/UL (ref 1–5.1)
LYMPHOCYTES RELATIVE PERCENT: 23.5 %
MCH RBC QN AUTO: 32.2 PG (ref 26–34)
MCHC RBC AUTO-ENTMCNC: 34.8 G/DL (ref 31–36)
MCV RBC AUTO: 92.3 FL (ref 80–100)
MONOCYTES ABSOLUTE: 0.8 K/UL (ref 0–1.3)
MONOCYTES RELATIVE PERCENT: 9.7 %
NEUTROPHILS ABSOLUTE: 5.4 K/UL (ref 1.7–7.7)
NEUTROPHILS RELATIVE PERCENT: 63.9 %
PDW BLD-RTO: 13 % (ref 12.4–15.4)
PLATELET # BLD: 179 K/UL (ref 135–450)
PMV BLD AUTO: 9.1 FL (ref 5–10.5)
POTASSIUM SERPL-SCNC: 4.6 MMOL/L (ref 3.5–5.1)
PROTHROMBIN TIME: 16.6 SEC (ref 9.9–12.7)
RBC # BLD: 5.01 M/UL (ref 4.2–5.9)
SODIUM BLD-SCNC: 138 MMOL/L (ref 136–145)
TOTAL PROTEIN: 8 G/DL (ref 6.4–8.2)
WBC # BLD: 8.4 K/UL (ref 4–11)

## 2022-05-06 PROCEDURE — 80053 COMPREHEN METABOLIC PANEL: CPT

## 2022-05-06 PROCEDURE — 36415 COLL VENOUS BLD VENIPUNCTURE: CPT

## 2022-05-06 PROCEDURE — 84443 ASSAY THYROID STIM HORMONE: CPT

## 2022-05-06 PROCEDURE — 85025 COMPLETE CBC W/AUTO DIFF WBC: CPT

## 2022-05-06 PROCEDURE — 80061 LIPID PANEL: CPT

## 2022-05-06 PROCEDURE — 85610 PROTHROMBIN TIME: CPT

## 2022-05-07 LAB
CHOLESTEROL, TOTAL: 157 MG/DL (ref 0–199)
HDLC SERPL-MCNC: 28 MG/DL (ref 40–60)
LDL CHOLESTEROL CALCULATED: 88 MG/DL
TRIGL SERPL-MCNC: 204 MG/DL (ref 0–150)
TSH REFLEX FT4: 3.78 UIU/ML (ref 0.27–4.2)
VLDLC SERPL CALC-MCNC: 41 MG/DL

## 2022-05-31 ENCOUNTER — NURSE ONLY (OUTPATIENT)
Dept: CARDIOLOGY CLINIC | Age: 52
End: 2022-05-31

## 2022-05-31 DIAGNOSIS — Z95.810 PRESENCE OF CARDIAC RESYNCHRONIZATION THERAPY DEFIBRILLATOR (CRT-D): ICD-10-CM

## 2022-05-31 NOTE — PROGRESS NOTES
Merlin Alert received - Remote transmission of pacemaker and/or ICD, or implanted heart monitor shows normal cardiac device function. Patient's last device interrogation was on 3/28. Estimated device longevity is 5.0 years. Patient has a history of ICM. Takes Coreg, coumadin, and Eliquis. AP 4.4%   97%    P wave 3.4 mV  R wave >12 mV    Since last device interrogation, AMS events recorded appear to show FFOS. NSVT events recorded. SUSIE MD to review. Patient to see [facility] at [facility] 6/6 - rep notified to check device and make programming changes as previously requested 12/29/21 by ISAMAR VT zone set to 490ms w/ ATP activated- patient is non compliant and has not f/u. Please see the Paceart report under the Cardiology tab for more detail.

## 2022-06-27 ENCOUNTER — NURSE ONLY (OUTPATIENT)
Dept: CARDIOLOGY CLINIC | Age: 52
End: 2022-06-27
Payer: MEDICAID

## 2022-06-27 DIAGNOSIS — I50.43 CHF (CONGESTIVE HEART FAILURE), NYHA CLASS I, ACUTE ON CHRONIC, COMBINED (HCC): Primary | ICD-10-CM

## 2022-06-27 DIAGNOSIS — I50.22 CHRONIC SYSTOLIC (CONGESTIVE) HEART FAILURE (HCC): ICD-10-CM

## 2022-06-27 DIAGNOSIS — I25.5 ISCHEMIC CARDIOMYOPATHY: ICD-10-CM

## 2022-06-27 DIAGNOSIS — Z95.810 PRESENCE OF CARDIAC RESYNCHRONIZATION THERAPY DEFIBRILLATOR (CRT-D): ICD-10-CM

## 2022-06-27 PROCEDURE — 93296 REM INTERROG EVL PM/IDS: CPT | Performed by: INTERNAL MEDICINE

## 2022-06-27 PROCEDURE — 93295 DEV INTERROG REMOTE 1/2/MLT: CPT | Performed by: INTERNAL MEDICINE

## 2022-06-27 PROCEDURE — 93297 REM INTERROG DEV EVAL ICPMS: CPT | Performed by: INTERNAL MEDICINE

## 2022-06-27 NOTE — PROGRESS NOTES
Unable to reach pt by phone-letter sent 1/5/22 to call office, needs programming changes per AGK/JMB - Change VT-1 to 490 ms/122 bpm w/ ATP on. Pt never made appt, please reach out to him again. Needs f/u w/ AJK and device check for programming changes. PT SCHEDULED 6/28/22 W/ AJK. Remote transmission received for patients CRT-D. Transmission shows normal sensing and pacing function. EP physician will review. See interrogation under the cardiology tab in the [address] [place] [address] [address] field for more details. Will continue to monitor remotely. hx NSVT (coreg)  And AT/TWOS. He takes coumadin for LV thrombus. End of 91-day monitoring period 6/27/22. BP 97%. AP 4.7%  PVC 2.2%. AT/AF burden < 1%. AMS recordings show FFOS. NSVT recorded,longest 12 sec. Corvue is within normal limits.

## 2022-07-27 RX ORDER — SACUBITRIL AND VALSARTAN 24; 26 MG/1; MG/1
1 TABLET, FILM COATED ORAL 2 TIMES DAILY
Qty: 60 TABLET | Refills: 5 | Status: SHIPPED | OUTPATIENT
Start: 2022-07-27

## 2022-09-06 RX ORDER — WARFARIN SODIUM 5 MG/1
TABLET ORAL
Qty: 75 TABLET | Refills: 3 | Status: SHIPPED | OUTPATIENT
Start: 2022-09-06 | End: 2022-09-27 | Stop reason: ALTCHOICE

## 2022-09-13 DIAGNOSIS — I50.43 CHF (CONGESTIVE HEART FAILURE), NYHA CLASS I, ACUTE ON CHRONIC, COMBINED (HCC): Primary | ICD-10-CM

## 2022-09-13 RX ORDER — SPIRONOLACTONE 25 MG/1
25 TABLET ORAL DAILY
Qty: 30 TABLET | Refills: 3 | Status: SHIPPED | OUTPATIENT
Start: 2022-09-13

## 2022-09-27 ENCOUNTER — HOSPITAL ENCOUNTER (OUTPATIENT)
Age: 52
Discharge: HOME | End: 2022-09-27
Payer: MEDICAID

## 2022-09-27 ENCOUNTER — OFFICE VISIT (OUTPATIENT)
Dept: CARDIOLOGY CLINIC | Age: 52
End: 2022-09-27
Payer: MEDICAID

## 2022-09-27 ENCOUNTER — NURSE ONLY (OUTPATIENT)
Dept: CARDIOLOGY CLINIC | Age: 52
End: 2022-09-27
Payer: MEDICAID

## 2022-09-27 VITALS
HEIGHT: 74 IN | DIASTOLIC BLOOD PRESSURE: 54 MMHG | SYSTOLIC BLOOD PRESSURE: 96 MMHG | BODY MASS INDEX: 29.77 KG/M2 | HEART RATE: 67 BPM | OXYGEN SATURATION: 97 % | WEIGHT: 232 LBS

## 2022-09-27 DIAGNOSIS — I50.22 CHRONIC SYSTOLIC CHF (CONGESTIVE HEART FAILURE) (HCC): ICD-10-CM

## 2022-09-27 DIAGNOSIS — I25.5 ISCHEMIC CARDIOMYOPATHY: ICD-10-CM

## 2022-09-27 DIAGNOSIS — I50.22 CHRONIC SYSTOLIC CHF (CONGESTIVE HEART FAILURE) (HCC): Primary | ICD-10-CM

## 2022-09-27 DIAGNOSIS — Z95.810 PRESENCE OF CARDIAC RESYNCHRONIZATION THERAPY DEFIBRILLATOR (CRT-D): Primary | ICD-10-CM

## 2022-09-27 DIAGNOSIS — I25.10 CORONARY ARTERY DISEASE INVOLVING NATIVE CORONARY ARTERY OF NATIVE HEART WITHOUT ANGINA PECTORIS: ICD-10-CM

## 2022-09-27 LAB
ANION GAP SERPL CALCULATED.3IONS-SCNC: 14 MMOL/L (ref 3–16)
BASOPHILS ABSOLUTE: 0.1 K/UL (ref 0–0.2)
BASOPHILS RELATIVE PERCENT: 0.7 %
BUN BLDV-MCNC: 14 MG/DL (ref 7–20)
CALCIUM SERPL-MCNC: 9.7 MG/DL (ref 8.3–10.6)
CHLORIDE BLD-SCNC: 97 MMOL/L (ref 99–110)
CO2: 26 MMOL/L (ref 21–32)
CREAT SERPL-MCNC: 0.9 MG/DL (ref 0.9–1.3)
EOSINOPHILS ABSOLUTE: 0.2 K/UL (ref 0–0.6)
EOSINOPHILS RELATIVE PERCENT: 1.8 %
GFR AFRICAN AMERICAN: >60
GFR NON-AFRICAN AMERICAN: >60
GLUCOSE BLD-MCNC: 96 MG/DL (ref 70–99)
HCT VFR BLD CALC: 47.1 % (ref 40.5–52.5)
HEMOGLOBIN: 16.2 G/DL (ref 13.5–17.5)
LYMPHOCYTES ABSOLUTE: 1.8 K/UL (ref 1–5.1)
LYMPHOCYTES RELATIVE PERCENT: 20.9 %
MCH RBC QN AUTO: 32.3 PG (ref 26–34)
MCHC RBC AUTO-ENTMCNC: 34.4 G/DL (ref 31–36)
MCV RBC AUTO: 93.9 FL (ref 80–100)
MONOCYTES ABSOLUTE: 0.9 K/UL (ref 0–1.3)
MONOCYTES RELATIVE PERCENT: 10.5 %
NEUTROPHILS ABSOLUTE: 5.8 K/UL (ref 1.7–7.7)
NEUTROPHILS RELATIVE PERCENT: 66.1 %
PDW BLD-RTO: 13.1 % (ref 12.4–15.4)
PLATELET # BLD: 196 K/UL (ref 135–450)
PMV BLD AUTO: 10.6 FL (ref 5–10.5)
POTASSIUM SERPL-SCNC: 4.7 MMOL/L (ref 3.5–5.1)
PRO-BNP: 456 PG/ML (ref 0–124)
RBC # BLD: 5.02 M/UL (ref 4.2–5.9)
SODIUM BLD-SCNC: 137 MMOL/L (ref 136–145)
WBC # BLD: 8.8 K/UL (ref 4–11)

## 2022-09-27 PROCEDURE — 83880 ASSAY OF NATRIURETIC PEPTIDE: CPT

## 2022-09-27 PROCEDURE — 4004F PT TOBACCO SCREEN RCVD TLK: CPT | Performed by: NURSE PRACTITIONER

## 2022-09-27 PROCEDURE — 36415 COLL VENOUS BLD VENIPUNCTURE: CPT

## 2022-09-27 PROCEDURE — G8427 DOCREV CUR MEDS BY ELIG CLIN: HCPCS | Performed by: NURSE PRACTITIONER

## 2022-09-27 PROCEDURE — 99214 OFFICE O/P EST MOD 30 MIN: CPT | Performed by: NURSE PRACTITIONER

## 2022-09-27 PROCEDURE — 3017F COLORECTAL CA SCREEN DOC REV: CPT | Performed by: NURSE PRACTITIONER

## 2022-09-27 PROCEDURE — G8419 CALC BMI OUT NRM PARAM NOF/U: HCPCS | Performed by: NURSE PRACTITIONER

## 2022-09-27 PROCEDURE — 93284 PRGRMG EVAL IMPLANTABLE DFB: CPT | Performed by: INTERNAL MEDICINE

## 2022-09-27 PROCEDURE — 80048 BASIC METABOLIC PNL TOTAL CA: CPT

## 2022-09-27 PROCEDURE — 85025 COMPLETE CBC W/AUTO DIFF WBC: CPT

## 2022-09-27 ASSESSMENT — ENCOUNTER SYMPTOMS
SHORTNESS OF BREATH: 1
GASTROINTESTINAL NEGATIVE: 1

## 2022-09-27 NOTE — PROGRESS NOTES
Patient presents to the device clinic today for a programming evaluation for his defibrillator. Patient has a history of ICM. Takes Coreg, coumadin, and Eliquis. Last device interrogation was on 6/27. Since then, NSVT events recorded. 3 AMS events recorded - available EGM appears to show TWOS. P wave: 4.7 mV  R wave: >12 mV    AP 3.9% BVP 98%    All sensing and pacing parameters are within normal range. VT detection changed to 122 bpm w/ ATP programmed on per Dr. Jaime Morales' request.    Patient will see BOBBY Becerra today in office. Interrogation was preformed by company representative. Patient education was provided about device functionality, in home monitoring, and any other patient questions and/or concerns were addressed. Patient voices understanding. Patient will follow up in 3 months in office or remotely. Please see interrogation for more detail - Paceart report located under the Cardiology tab.

## 2022-09-27 NOTE — PROGRESS NOTES
[address]   Cardiology Note              Date:  September 27, 2022  Patientname: Raimundo Lakhani [facility]  YOB: 1970    Primary Care physician: No primary care provider on file. HISTORY OF PRESENT ILLNESS: Tino Marina is a 45 y.o. male with a history of CAD, cardiomyopathy, CHF, LV thrombus, AF, HLD. He has a history of inferior MI in 2012 with thrombectomy and OLU RCA. He had AF during PCI, treated with CV. EF 55-60% on echo. He did not follow up after 2014. He was admitted 2/2020 for shortness of breath. Echo showed EF 20-25%, +apical thrombus. Stress test 2/2020 showed fixed defects, no ischemia. Echo 6/5/2020 showed EF <20%. Echo 11/3/2020 showed EF 25-30%, probable apical thrombus. On 12/18/2020 he had BiV ICD implanted. Echo 6/22/2021 showed EF 15-20%. Today he presents for follow up for CHF. He has felt better the last few weeks but does struggle with fatigue and shortness of breath at times. He has no chest pain, palpitations, edema, syncope, dizziness. He does not sleep well, sleeps 1-2 hours at a time and feels this contributes to his fatigue. He works as an  and . He has transportation issues and has not follow up with EP or regularly for CHF appointments. Office weight today 9/27/2022: 232 lbs  Office weight 4/2022: 229 lbs    NYHA class III    Past Medical History:   has a past medical history of CAD (coronary artery disease), Heart attack ([address] and Neck pain. Past Surgical History:   has a past surgical history that includes cervical fusion and Cardiac surgery. Home Medications:    Prior to Admission medications    Medication Sig Start Date End Date Taking?  Authorizing Provider   spironolactone (ALDACTONE) 25 MG tablet TAKE 1 TABLET BY MOUTH DAILY 9/13/22   ERVIN Garcia CNP   warfarin (COUMADIN) 5 MG tablet TAKE 2 TO 2.5 TABLETS AS DIRECTED BY COUMADIN CLINIC 9/6/22   MD FESTUS Vila 24-26 MG per tablet TAKE 1 TABLET BY MOUTH 2 TIMES DAILY 7/27/22   Lesa Grier MD   apixaban Verneita Radha) 5 MG TABS tablet Take 1 tablet by mouth 2 times daily 4/13/22   Lesa Grier MD   atorvastatin (LIPITOR) 40 MG tablet Take 1 tablet by mouth nightly 4/13/22   Lesa Grier MD   carvedilol (COREG) 6.25 MG tablet Take 1 tablet by mouth 2 times daily (with meals) 4/13/22   Lesa Grier MD   furosemide (LASIX) 40 MG tablet Take 1 tablet by mouth 2 times daily TAKE (1) TABLET DAILY 4/13/22   Lesa Grier MD   SPIRIVA HANDIHALER 18 MCG inhalation capsule USE 1 CAPSULE IN [facility] ONCE DAILY 4/9/22   Liyah Muhammad MD   aspirin 81 MG chewable tablet Take 1 tablet by mouth daily 2/15/20   ERVIN Sevilla - CNP   traMADol (ULTRAM) 50 MG tablet Take 100 mg by mouth every 8 hours as needed for Pain. Historical Provider, MD     Allergies:  Statins    Social History:   reports that he has been smoking cigarettes. He has a 7.00 pack-year smoking history. He has never used smokeless tobacco. He reports that he does not currently use alcohol. He reports that he does not use drugs. Family History: family history includes Diabetes in his mother; Heart Disease in his father and mother. Review of Systems   Review of Systems   Constitutional:  Positive for fatigue. Respiratory:  Positive for shortness of breath. Cardiovascular: Negative. Gastrointestinal: Negative. Neurological: Negative.       OBJECTIVE:    Vital signs:    BP (!) 96/54   Pulse 67   Ht 6' 2\" (1.88 m)   Wt 232 lb (105.2 kg)   SpO2 97%   BMI 29.79 kg/m²      Physical Exam:  Constitutional:  Comfortable and alert, NAD, appears older than stated age  Eyes: PERRL, sclera nonicteric  Neck:  Supple, no masses, no thyroidmegaly, JVP 6  Skin:  Warm and dry; no rash or lesions  Heart: Regular, normal apex, S1 and S2 normal, no M/G/R  Lungs:  Normal respiratory effort; +wheezing  Abdomen: soft, non tender, + bowel sounds  Extremities:  No edema or cyanosis; no clubbing  Neuro: alert and oriented, moves legs and arms equally, normal mood and affect    Data Reviewed:      Echo 6/22/2021:  Definity contrast administered. Left ventricular systolic function is reduced with ejection fraction   estimated at 15-20 %. Left ventricular size is severely increased. Normal left ventricular wall thickness. 11/03/2020 25-30% EF, LVE, severe global hypokinesis with regional   variation, probable3 LV Thrombus (Definity)    Echo 11/3/2020:  Limited study for cardiomyopathy and left ventricle systolic function. Definity is used for thrombus detection. Left ventricular systolic function is severely reduced with a visually   estimated ejection fraction of 25-30%. EF estimated by Gu's method at 30   %. The left ventricle is mildly dilated in size with normal wall thickness. Severe global hypokinesis with regional variation. Indeterminate diastolic function. Probable apical left ventricular thrombus is visualized. The left atrium is severely dilated. The right ventricle is mildly dilated. Compared to last echo on 6/5/20(EF <20%), left ventricle systolic function   has improved. Echo 6/5/2020:  Limited exam for LVEF and apical thrombus measuring 2x1.2cm. Left ventricular systolic function is severely reduced with a visually   estimated ejection fraction of <20 %. EF estimated by Gu's method at 17% %. The left ventricle is moderately dilated in size. Severe global hypokinesis with regional variation. The right ventricle is not well visualized but appears dilated in size. Right ventricular systolic function is reduced. Severe bi-atrial enlargement. Systolic pulmonary artery pressure (SPAP) is elevated and estimated at 44   mmHg (right atrial pressure 15 mmHg) consistent with mild pulmonary   hypertension.    The IVC is dilated in size (>2.1 cm) and collapses <50% with respiration   consistent with elevated right atrial pressures (15 mmHg) . Stress test 2/13/2020:  Severely reduced LVEF 27%     Severe global hypokinesis with regional variation, more prominent     inferoapical hypokinesis is noted. Severely enlarged LVEDV 277c and LVESV 203cc     Fixed inferoapical and lateral defects consistent with scar     No ischemia      Coronary angiogram 12/2012:  1. Severe 1-vessel CAD with 99% mid-RCA stenosis with thrombus and DARLING-2 distal flow  2. D2 with diffuse ostial-proximal 30% plaquing but not severe  3. Moderately impaired LV systolic function with EF 40% with severe inferior hypokinesis but anterolateral mild hypokinesis  4. Successful aspiration thrombectomy/stenting of the mid-RCA with a 4.0x 15 mm RESOLUTE OLU post-dilated with a 4.5 mm noncompliant balloon to high pressure with 0% residual stenosis and DARLING-3 flow  5. Successful Angioseal device closure of the right femoral arteriotomy  During the procedure he went into atrial fibrillation was was successfully cardioverted into a sinus rhythm. No further post operative complications were noted. He was started on the appropriate medication and was counseled on each medication. Smoking cessation was discussed. He will be discharged today with instructions to follow-up with Dr Corinne Shams at the [facility] in 7 to 10 days. Cardiology Labs Reviewed:   CBC: No results for input(s): WBC, HGB, HCT, PLT in the last 72 hours. BMP:No results for input(s): NA, K, CO2, BUN, CREATININE, LABGLOM, GLUCOSE in the last 72 hours. PT/INR: No results for input(s): PROTIME, INR in the last 72 hours. APTT:No results for input(s): APTT in the last 72 hours. FASTING LIPID PANEL:  Lab Results   Component Value Date/Time    HDL 28 05/06/2022 10:07 AM    LDLCALC 88 05/06/2022 10:07 AM    TRIG 204 05/06/2022 10:07 AM     LIVER PROFILE:No results for input(s): AST, ALT, ALB in the last 72 hours.   BNP:   Lab Results   Component Value Date/Time    PROBNP 1,078 01/25/2022 11:08 AM    PROBNP 652 06/22/2021 01:25 PM    PROBNP 469 11/11/2020 09:19 AM    PROBNP 931 09/30/2020 02:34 AM    PROBNP 445 02/15/2020 04:31 AM     Reviewed all labs and imaging today    Assessment:   CAD: no angina; no ischemia on stress test 2/2020; s/p OLU RCA in the setting of MI 12/2012  Ischemic cardiomyopathy: ongoing, EF 15-20% on echo 6/2021; EF 25-30% on echo 11/2020 from <20% on echo 6/2020 and 2/2020   - s/p BiV ICD 12/18/2020  Chronic combined CHF: appears compensated  LV thrombus: on eliquis; probable on echo 11/2020; originally noted 2/2020  RBBB  Paroxysmal atrial fibrillation: stable   - [id number] score 4 (CHF, LV thrombus, MI)   - noted initially 12/2012 post PCI with MI, s/p CV  NSVT: noted on device interrogations, reprogrammed per EP  HLD: almost to goal, LDL 88, continue statin  Tobacco abuse: counseled  Noncompliance: intermittently compliant with follow ups    Plan:   1. Recheck BMP, BNP, CBC  2. If renal function stable, will add farxiga 10 mg daily  3. Continue entresto, carvedilol, spironolactone; unable to up titrate due to hypotension  4. Continue aspirin, statin, eliquis; off coumadin due to noncompliance with INR checks  5. Pending symptoms and compliance, may need to consider referral to advanced heart failure team for LVAD or transplant consideration  6. Establish with PCP  7. Follow up with EP for NSVT  8.  Follow up with Dr. Laila Cameron, [address]  [phone]

## 2022-09-27 NOTE — PATIENT INSTRUCTIONS
Blood work today  Will add on new heart failure medication pending results, farxiga  Continue other medications  Follow up with Dr. Ruth Corey and Dr. Thao Garcia

## 2022-09-28 NOTE — TELEPHONE ENCOUNTER
Pt called back message/results given. Pt verbalized understanding.   Needs Farxiga sent to [facility]

## 2022-09-28 NOTE — TELEPHONE ENCOUNTER
----- Message from ERVIN Lomas - CNP sent at 9/28/2022  8:21 AM EDT -----  Please let him know that blood work looks great. Would like for him to start farxiga 10 mg daily for CHF.  Thank you

## 2022-10-31 ENCOUNTER — NURSE ONLY (OUTPATIENT)
Dept: CARDIOLOGY CLINIC | Age: 52
End: 2022-10-31
Payer: MEDICAID

## 2022-10-31 DIAGNOSIS — I50.43 CHF (CONGESTIVE HEART FAILURE), NYHA CLASS I, ACUTE ON CHRONIC, COMBINED (HCC): Primary | ICD-10-CM

## 2022-10-31 DIAGNOSIS — I50.22 CHRONIC SYSTOLIC (CONGESTIVE) HEART FAILURE (HCC): ICD-10-CM

## 2022-10-31 DIAGNOSIS — I25.5 ISCHEMIC CARDIOMYOPATHY: ICD-10-CM

## 2022-10-31 DIAGNOSIS — Z95.810 PRESENCE OF CARDIAC RESYNCHRONIZATION THERAPY DEFIBRILLATOR (CRT-D): ICD-10-CM

## 2022-10-31 PROCEDURE — 93297 REM INTERROG DEV EVAL ICPMS: CPT | Performed by: INTERNAL MEDICINE

## 2022-10-31 PROCEDURE — 93295 DEV INTERROG REMOTE 1/2/MLT: CPT | Performed by: INTERNAL MEDICINE

## 2022-10-31 PROCEDURE — 93296 REM INTERROG EVL PM/IDS: CPT | Performed by: INTERNAL MEDICINE

## 2022-10-31 NOTE — PROGRESS NOTES
End of 91-day monitoring period 10/31. NSVT/SVT recorded. Corvue is within normal limits. AP 10%  BP 97%. Remote transmission received for patients CRT-D. Transmission shows normal sensing and pacing function. EP physician will review. See interrogation under the cardiology tab in the [address] [place] [address] [address] field for more details. Will continue to monitor remotely. hx NSVT (coreg)  And AT/TWOS. He takes coumadin for LV thrombus.

## 2022-11-23 ENCOUNTER — OFFICE VISIT (OUTPATIENT)
Dept: FAMILY MEDICINE CLINIC | Age: 52
End: 2022-11-23
Payer: MEDICAID

## 2022-11-23 VITALS
DIASTOLIC BLOOD PRESSURE: 64 MMHG | BODY MASS INDEX: 29.52 KG/M2 | SYSTOLIC BLOOD PRESSURE: 92 MMHG | HEIGHT: 74 IN | WEIGHT: 230 LBS

## 2022-11-23 DIAGNOSIS — R21 PERINEAL RASH IN MALE: ICD-10-CM

## 2022-11-23 DIAGNOSIS — Z95.810 PRESENCE OF CARDIAC RESYNCHRONIZATION THERAPY DEFIBRILLATOR (CRT-D): ICD-10-CM

## 2022-11-23 DIAGNOSIS — J43.2 CENTRILOBULAR EMPHYSEMA (HCC): ICD-10-CM

## 2022-11-23 DIAGNOSIS — I25.10 CORONARY ARTERY DISEASE INVOLVING NATIVE CORONARY ARTERY OF NATIVE HEART WITHOUT ANGINA PECTORIS: ICD-10-CM

## 2022-11-23 DIAGNOSIS — M13.0 POLYARTHRITIS: ICD-10-CM

## 2022-11-23 DIAGNOSIS — R53.83 OTHER FATIGUE: ICD-10-CM

## 2022-11-23 DIAGNOSIS — Z71.89 ADVANCED DIRECTIVES, COUNSELING/DISCUSSION: ICD-10-CM

## 2022-11-23 DIAGNOSIS — N52.1 ERECTILE DYSFUNCTION DUE TO DISEASES CLASSIFIED ELSEWHERE: ICD-10-CM

## 2022-11-23 DIAGNOSIS — I50.22 CHRONIC SYSTOLIC CONGESTIVE HEART FAILURE (HCC): Primary | ICD-10-CM

## 2022-11-23 PROCEDURE — G8427 DOCREV CUR MEDS BY ELIG CLIN: HCPCS

## 2022-11-23 PROCEDURE — 3017F COLORECTAL CA SCREEN DOC REV: CPT

## 2022-11-23 PROCEDURE — G8484 FLU IMMUNIZE NO ADMIN: HCPCS

## 2022-11-23 PROCEDURE — 3023F SPIROM DOC REV: CPT

## 2022-11-23 PROCEDURE — 99205 OFFICE O/P NEW HI 60 MIN: CPT

## 2022-11-23 PROCEDURE — 4004F PT TOBACCO SCREEN RCVD TLK: CPT

## 2022-11-23 PROCEDURE — G8419 CALC BMI OUT NRM PARAM NOF/U: HCPCS

## 2022-11-23 RX ORDER — NYSTATIN 100000 [USP'U]/G
POWDER TOPICAL
Qty: 15 G | Refills: 0 | Status: SHIPPED | OUTPATIENT
Start: 2022-11-23

## 2022-11-23 ASSESSMENT — ENCOUNTER SYMPTOMS
CHEST TIGHTNESS: 0
CONSTIPATION: 0
EYE DISCHARGE: 0
SORE THROAT: 0
COLOR CHANGE: 0
ABDOMINAL DISTENTION: 0
COUGH: 0
DIARRHEA: 0
SHORTNESS OF BREATH: 1
EYE PAIN: 0
ABDOMINAL PAIN: 0

## 2022-11-23 ASSESSMENT — PATIENT HEALTH QUESTIONNAIRE - PHQ9
10. IF YOU CHECKED OFF ANY PROBLEMS, HOW DIFFICULT HAVE THESE PROBLEMS MADE IT FOR YOU TO DO YOUR WORK, TAKE CARE OF THINGS AT HOME, OR GET ALONG WITH OTHER PEOPLE: 1
6. FEELING BAD ABOUT YOURSELF - OR THAT YOU ARE A FAILURE OR HAVE LET YOURSELF OR YOUR FAMILY DOWN: 0
9. THOUGHTS THAT YOU WOULD BE BETTER OFF DEAD, OR OF HURTING YOURSELF: 0
1. LITTLE INTEREST OR PLEASURE IN DOING THINGS: 1
7. TROUBLE CONCENTRATING ON THINGS, SUCH AS READING THE NEWSPAPER OR WATCHING TELEVISION: 1
SUM OF ALL RESPONSES TO PHQ QUESTIONS 1-9: 8
SUM OF ALL RESPONSES TO PHQ QUESTIONS 1-9: 8
2. FEELING DOWN, DEPRESSED OR HOPELESS: 0
8. MOVING OR SPEAKING SO SLOWLY THAT OTHER PEOPLE COULD HAVE NOTICED. OR THE OPPOSITE, BEING SO FIGETY OR RESTLESS THAT YOU HAVE BEEN MOVING AROUND A LOT MORE THAN USUAL: 0
SUM OF ALL RESPONSES TO PHQ QUESTIONS 1-9: 8
SUM OF ALL RESPONSES TO PHQ9 QUESTIONS 1 & 2: 1
5. POOR APPETITE OR OVEREATING: 1
4. FEELING TIRED OR HAVING LITTLE ENERGY: 2
3. TROUBLE FALLING OR STAYING ASLEEP: 3
SUM OF ALL RESPONSES TO PHQ QUESTIONS 1-9: 8

## 2022-11-23 NOTE — PROGRESS NOTES
[facility] Medicine  Establish care visit   2022    Miles Rahman (:  1970) is a 47 y.o. male, here to establish care. Chief Complaint   Patient presents with    Establish Care     Has not seen a pcp in 8 / cardiologist wants pt to get pcp    Leg Pain     Both legs from knee down    Shoulder Pain     Left pain    Rash     Inside of both legs     Erectile Dysfunction    Nicotine Dependence     Pt wants to stop        ASSESSMENT/ PLAN  1. Chronic systolic congestive heart failure (HCC)  -Extensive cardiac disease history.  -Last echo in 2021 showed an EF of 15 to 20%. -He does follow with cardiology, close follow-up has been difficult due to noncompliance from patient.  -He is on guideline directed therapy of Entresto 20-26 mg twice daily, atorvastatin 40 mg daily, carvedilol 6.25 mg twice daily, Lasix 40 mg twice daily, ASA 81 mg daily, and spironolactone 25 mg daily.  -Echocardiogram ordered to update EF.  -We did discuss his disease progression and poor prognosis. Per cardiology is note, he could possibly be a candidate for heart transplant/LVAD. We did discuss that he would need to increase his compliance with close follow-up visits with cardiology/EP as well as smoking cessation. He agrees to quit smoking.  -He is grossly asymptomatic.  -Referral placed for spiritual services to assist with advanced directives and POA  -Did discuss with cardiology about following up closely. I did stress the importance of following up with appointments and treatment compliance with him again.    - Echocardiogram complete; Future  - [address]    2. Presence of cardiac resynchronization therapy defibrillator (CRT-D)  -BiV ICD was placed in  due to severe ischemic cardiomyopathy.  -Continue to follow with EP regularly. 3. Centrilobular emphysema ([address]  -Stable. -Continue Spiriva.     4. Other fatigue  -Likely multifactorial.  He does say that working as a / and volunteering as a  has been more of a challenge lately.  -Lab work done today. Although likely causes #1.    - Comprehensive Metabolic Panel; Future  - Hemoglobin A1C; Future  - CBC with Auto Differential; Future  - TSH with Reflex; Future    5. Coronary artery disease involving native coronary artery of native heart without angina pectoris  - s/p thrombectomy of LV and OLU of RCA in 2012.  - ST in 2020 showing fixed deficits with no ischemia. Apical thrombus. - Resulting in ischemic cardiomyopathy  - Per Cardiology note, he was transitioned from Coumadin to Eliquis d/t non-compliance with the INR clinic; however, the patient reported that he has continued to take the 5 mg of Coumadin but has not gotten his INR checked since May. - We re-ordered his Eliquis and stopped his Coumadin- pt is a agreeable and aware. - Lipid Panel; Future  - apixaban (ELIQUIS) 5 MG TABS tablet; Take 1 tablet by mouth 2 times daily  Dispense: 60 tablet; Refill: 6    6. Polyarthritis  - Generalized complaints of BLE and L shoulder.   - Semi-controlled on PRN Tramadol.  - Other treatment options are limited such as Celebrex and Mobic d/t him being on long-term AC.  - OTC Voltaren cream encouraged. 7. Perineal rash in male  - New onset within the last 2 weeks. He has attempted antifungal spray that \"keeps it at bay\". - Nystatin powder ordered   -Likely fungal and yeast related    - nystatin (MYCOSTATIN) 916323 UNIT/GM powder; Apply 3 times daily. Dispense: 15 g; Refill: 0    8. Erectile dysfunction due to diseases classified elsewhere  - High suspicion is related to significant cardiac disease.   - Not clear whether he is cleared for sexual intercourse from cardiology. - Advised patient to secede from intercourse and to f/u with this issue for cardiology.          I have personally spent 60 minutes reviewing chart, patient education, clinical care with patient and education to patient and family, as well as consulting with other team members and clinical references. Preventative Care:  Labs  ECHO  Needs LDCT    Imaging:    Return in about 3 months (around 2/23/2023) for CHF f/u. HPI  The patient is here to establish care. He has a significant history cardiac disease that began in 2012 with MIs and LV thrombus, AF, an apical thrombus is 2020, and BiV ICD placement in 2021. He has significant CHF w/ last Echo in June of '21 revealing an EF of 15-20%. He currently denies any chest pain. He does have some exertional shortness of breath that has somewhat improved since being put on Farxiga by Cardiology . He admits to some issues with compliancy with follow-ups related to his cardiac health, but has since fixed his transportation problems. According to cardiology notes he is supposed to be on Eliquis twice daily but is on warfarin 5 mg daily. He does not follow-up with his INR draws regularly. He also complains of generalized body aches. His BLE pains are controlled on PRN Tramadol. He also is complaining of left shoulder pain that he has been experiencing while working that is not controlled on the Tramadol    He is also complaining of a groin rash that has been semi-controlled on an OTC anti-fungal spray. 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 Mother passed away from DM  Father passed away from CHF    ROS  Review of Systems   Constitutional:  Negative for chills, fever and unexpected weight change. HENT:  Positive for dental problem. Negative for congestion and sore throat. Eyes:  Negative for pain and discharge. Respiratory:  Positive for shortness of breath (w/ exertion). Negative for cough and chest tightness.     Cardiovascular: Negative for chest pain, palpitations and leg swelling. Gastrointestinal:  Negative for abdominal distention, abdominal pain, constipation and diarrhea. Endocrine: Negative for polydipsia, polyphagia and polyuria. Genitourinary:  Negative for dysuria, flank pain, hematuria and urgency. ED   Musculoskeletal:  Positive for arthralgias. Negative for joint swelling and myalgias. Skin:  Positive for rash (perineal). Negative for color change. Neurological:  Negative for dizziness, light-headedness, numbness and headaches. Psychiatric/Behavioral:  Negative for agitation and behavioral problems. The patient is not nervous/anxious. HISTORIES  Current Outpatient Medications on File Prior to Visit   Medication Sig Dispense Refill    dapagliflozin (FARXIGA) 10 MG tablet Take 1 tablet by mouth every morning 30 tablet 5    spironolactone (ALDACTONE) 25 MG tablet TAKE 1 TABLET BY MOUTH DAILY 30 tablet 3    ENTRESTO 24-26 MG per tablet TAKE 1 TABLET BY MOUTH 2 TIMES DAILY 60 tablet 5    atorvastatin (LIPITOR) 40 MG tablet Take 1 tablet by mouth nightly 30 tablet 6    carvedilol (COREG) 6.25 MG tablet Take 1 tablet by mouth 2 times daily (with meals) 60 tablet 6    furosemide (LASIX) 40 MG tablet Take 1 tablet by mouth 2 times daily TAKE (1) TABLET DAILY 60 tablet 6    SPIRIVA HANDIHALER 18 MCG inhalation capsule USE 1 CAPSULE IN HANDIHALER ONCE DAILY 30 capsule 5    aspirin 81 MG chewable tablet Take 1 tablet by mouth daily 30 tablet 3    traMADol (ULTRAM) 50 MG tablet Take 100 mg by mouth every 8 hours as needed for Pain. No current facility-administered medications on file prior to visit.         Allergies   Allergen Reactions    Statins      Muscle weakness  Muscle weakness       Past Medical History:   Diagnosis Date    CAD (coronary artery disease)     Heart attack ([facility]     Neck pain        Patient Active Problem List   Diagnosis    Acute systolic (congestive) heart failure ([facility]    Coronary artery disease involving native coronary artery of native heart without angina pectoris    SOB (shortness of breath)    Elevated brain natriuretic peptide (BNP) level    Acute pulmonary edema (HCC)    Cardiomegaly    Ischemic cardiomyopathy    Left ventricular thrombosis    Hyponatremia    Leukocytosis    Chronic systolic (congestive) heart failure (HCC)    Presence of cardiac resynchronization therapy defibrillator (CRT-D)    Tobacco abuse    Centrilobular emphysema (HCC)       Past Surgical History:   Procedure Laterality Date    CARDIAC SURGERY      2012 at [address] stent placed     CERVICAL FUSION      [address] at [address] History     Socioeconomic History    Marital status:      Spouse name: Not on file    Number of children: Not on file    Years of education: Not on file    Highest education level: Not on file   Occupational History    Not on file   Tobacco Use    Smoking status: Every Day     Packs/day: 1.00     Years: 28.00     Pack years: 28.00     Types: Cigarettes    Smokeless tobacco: Never   Vaping Use    Vaping Use: Never used   Substance and Sexual Activity    Alcohol use: Not Currently    Drug use: No    Sexual activity: Yes     Partners: Female   Other Topics Concern    Not on file   Social History Narrative    Not on file     Social Determinants of Health     Financial Resource Strain: Not on file   Food Insecurity: Not on file   Transportation Needs: Not on file   Physical Activity: Not on file   Stress: Not on file   Social Connections: Not on file   Intimate Partner Violence: Not on file   Housing Stability: Not on file        Family History   Problem Relation Age of Onset    Diabetes Mother     Heart Disease Mother     Heart Disease Father        PE  Vitals:    11/23/22 0854   BP: 92/64   Site: Left Upper Arm   Position: Sitting   Cuff Size: Large Adult   Weight: 230 lb (104.3 kg)   Height: 6' 2\" (1.88 m)     Estimated body mass index is 29.53 kg/m² as calculated from the following:    Height as of this encounter: 6' 2\" (1.88 m). Weight as of this encounter: 230 lb (104.3 kg). Physical Exam  Constitutional:       General: He is not in acute distress. Appearance: Normal appearance. He is not ill-appearing. HENT:      Head: Normocephalic. Right Ear: Tympanic membrane and external ear normal.      Left Ear: Tympanic membrane and external ear normal.      Nose: No congestion or rhinorrhea. Mouth/Throat:      Mouth: Mucous membranes are moist.      Dentition: Dental tenderness and dental caries present. Pharynx: Oropharynx is clear. No posterior oropharyngeal erythema. Eyes:      Extraocular Movements: Extraocular movements intact. Conjunctiva/sclera: Conjunctivae normal.      Pupils: Pupils are equal, round, and reactive to light. Cardiovascular:      Rate and Rhythm: Normal rate and regular rhythm. Pulses: Normal pulses. Heart sounds: Heart sounds are distant (muffled). No murmur heard. Pulmonary:      Effort: Pulmonary effort is normal. No respiratory distress. Breath sounds: Normal breath sounds. No wheezing. Abdominal:      General: Abdomen is flat. Bowel sounds are normal.      Palpations: Abdomen is soft. Tenderness: There is no abdominal tenderness. Hernia: No hernia is present. Musculoskeletal:         General: No swelling. Normal range of motion. Cervical back: Normal range of motion and neck supple. No tenderness. Right lower leg: No edema. Left lower leg: No edema. Lymphadenopathy:      Cervical: No cervical adenopathy. Skin:     General: Skin is warm and dry. Capillary Refill: Capillary refill takes less than 2 seconds. Neurological:      General: No focal deficit present. Mental Status: He is alert and oriented to person, place, and time. Mental status is at baseline. Cranial Nerves: No cranial nerve deficit.    Psychiatric:         Mood and Affect: Mood normal. Behavior: Behavior normal.         Judgment: Judgment normal.       Immunization History   Administered Date(s) Administered    COVID-19, MODERNA BLUE border, Primary or Immunocompromised, (age 12y+), IM, 100 mcg/0.5mL 08/11/2021, 09/08/2021       Health Maintenance   Topic Date Due    Pneumococcal 0-64 years Vaccine (1 - PCV) Never done    Depression Screen  Never done    DTaP/Tdap/Td vaccine (1 - Tdap) Never done    Colorectal Cancer Screen  Never done    Shingles vaccine (1 of 2) Never done    COVID-19 Vaccine (3 - Booster for Moderna series) 11/03/2021    Flu vaccine (1) 08/01/2022    Hepatitis C screen  11/23/2023 (Originally 10/23/1987)    HIV screen  11/23/2023 (Originally 10/23/1984)    Lipids  05/06/2023    Hepatitis A vaccine  Aged Out    Hib vaccine  Aged Out    Meningococcal (ACWY) vaccine  Aged Out       PSH, PMH, SH and FH reviewed and noted. Recent and past labs, tests and consults also reviewed. Recent or new meds also reviewed. Emre Elena, APRN - CNP    This dictation was generated by voice recognition computer software. Although all attempts are made to edit the dictation for accuracy, there may be errors in the transcription that are not intended.

## 2022-11-28 ENCOUNTER — NURSE ONLY (OUTPATIENT)
Dept: FAMILY MEDICINE CLINIC | Age: 52
End: 2022-11-28
Payer: MEDICAID

## 2022-11-28 DIAGNOSIS — R53.83 OTHER FATIGUE: ICD-10-CM

## 2022-11-28 DIAGNOSIS — I25.10 CORONARY ARTERY DISEASE INVOLVING NATIVE CORONARY ARTERY OF NATIVE HEART WITHOUT ANGINA PECTORIS: ICD-10-CM

## 2022-11-28 LAB
A/G RATIO: 1.4 (ref 1.1–2.2)
ALBUMIN SERPL-MCNC: 4.5 G/DL (ref 3.4–5)
ALP BLD-CCNC: 95 U/L (ref 40–129)
ALT SERPL-CCNC: 18 U/L (ref 10–40)
ANION GAP SERPL CALCULATED.3IONS-SCNC: 20 MMOL/L (ref 3–16)
AST SERPL-CCNC: 14 U/L (ref 15–37)
BASOPHILS ABSOLUTE: 0.1 K/UL (ref 0–0.2)
BASOPHILS RELATIVE PERCENT: 1 %
BILIRUB SERPL-MCNC: 0.5 MG/DL (ref 0–1)
BUN BLDV-MCNC: 17 MG/DL (ref 7–20)
CALCIUM SERPL-MCNC: 10.3 MG/DL (ref 8.3–10.6)
CHLORIDE BLD-SCNC: 97 MMOL/L (ref 99–110)
CHOLESTEROL, TOTAL: 173 MG/DL (ref 0–199)
CO2: 23 MMOL/L (ref 21–32)
CREAT SERPL-MCNC: 0.7 MG/DL (ref 0.9–1.3)
EOSINOPHILS ABSOLUTE: 0.2 K/UL (ref 0–0.6)
EOSINOPHILS RELATIVE PERCENT: 1.8 %
GFR SERPL CREATININE-BSD FRML MDRD: >60 ML/MIN/{1.73_M2}
GLUCOSE BLD-MCNC: 111 MG/DL (ref 70–99)
HCT VFR BLD CALC: 52.4 % (ref 40.5–52.5)
HDLC SERPL-MCNC: 28 MG/DL (ref 40–60)
HEMOGLOBIN: 17.5 G/DL (ref 13.5–17.5)
LDL CHOLESTEROL CALCULATED: 104 MG/DL
LYMPHOCYTES ABSOLUTE: 1.9 K/UL (ref 1–5.1)
LYMPHOCYTES RELATIVE PERCENT: 20.9 %
MCH RBC QN AUTO: 32.7 PG (ref 26–34)
MCHC RBC AUTO-ENTMCNC: 33.5 G/DL (ref 31–36)
MCV RBC AUTO: 97.8 FL (ref 80–100)
MONOCYTES ABSOLUTE: 0.8 K/UL (ref 0–1.3)
MONOCYTES RELATIVE PERCENT: 8.9 %
NEUTROPHILS ABSOLUTE: 6.3 K/UL (ref 1.7–7.7)
NEUTROPHILS RELATIVE PERCENT: 67.4 %
PDW BLD-RTO: 13.3 % (ref 12.4–15.4)
PLATELET # BLD: 160 K/UL (ref 135–450)
PMV BLD AUTO: 10.6 FL (ref 5–10.5)
POTASSIUM SERPL-SCNC: 4.6 MMOL/L (ref 3.5–5.1)
RBC # BLD: 5.36 M/UL (ref 4.2–5.9)
SODIUM BLD-SCNC: 140 MMOL/L (ref 136–145)
TOTAL PROTEIN: 7.7 G/DL (ref 6.4–8.2)
TRIGL SERPL-MCNC: 207 MG/DL (ref 0–150)
TSH REFLEX: 4.23 UIU/ML (ref 0.27–4.2)
VLDLC SERPL CALC-MCNC: 41 MG/DL
WBC # BLD: 9.3 K/UL (ref 4–11)

## 2022-11-28 PROCEDURE — 36415 COLL VENOUS BLD VENIPUNCTURE: CPT

## 2022-11-29 LAB
ESTIMATED AVERAGE GLUCOSE: 108.3 MG/DL
HBA1C MFR BLD: 5.4 %
T4 FREE: 1.3 NG/DL (ref 0.9–1.8)

## 2022-12-06 ENCOUNTER — OFFICE VISIT (OUTPATIENT)
Dept: CARDIOLOGY CLINIC | Age: 52
End: 2022-12-06
Payer: MEDICAID

## 2022-12-06 ENCOUNTER — NURSE ONLY (OUTPATIENT)
Dept: CARDIOLOGY CLINIC | Age: 52
End: 2022-12-06
Payer: MEDICAID

## 2022-12-06 VITALS
OXYGEN SATURATION: 96 % | SYSTOLIC BLOOD PRESSURE: 114 MMHG | HEART RATE: 72 BPM | HEIGHT: 74 IN | BODY MASS INDEX: 29.16 KG/M2 | WEIGHT: 227.2 LBS | DIASTOLIC BLOOD PRESSURE: 62 MMHG

## 2022-12-06 DIAGNOSIS — I49.9 IRREGULAR HEART RATE: Primary | ICD-10-CM

## 2022-12-06 DIAGNOSIS — Z95.810 PRESENCE OF CARDIAC RESYNCHRONIZATION THERAPY DEFIBRILLATOR (CRT-D): ICD-10-CM

## 2022-12-06 DIAGNOSIS — I25.5 ISCHEMIC CARDIOMYOPATHY: Primary | ICD-10-CM

## 2022-12-06 DIAGNOSIS — I25.5 ISCHEMIC CARDIOMYOPATHY: ICD-10-CM

## 2022-12-06 PROCEDURE — 3017F COLORECTAL CA SCREEN DOC REV: CPT | Performed by: INTERNAL MEDICINE

## 2022-12-06 PROCEDURE — 93284 PRGRMG EVAL IMPLANTABLE DFB: CPT | Performed by: INTERNAL MEDICINE

## 2022-12-06 PROCEDURE — 99214 OFFICE O/P EST MOD 30 MIN: CPT | Performed by: INTERNAL MEDICINE

## 2022-12-06 PROCEDURE — G8484 FLU IMMUNIZE NO ADMIN: HCPCS | Performed by: INTERNAL MEDICINE

## 2022-12-06 PROCEDURE — 4004F PT TOBACCO SCREEN RCVD TLK: CPT | Performed by: INTERNAL MEDICINE

## 2022-12-06 PROCEDURE — G8419 CALC BMI OUT NRM PARAM NOF/U: HCPCS | Performed by: INTERNAL MEDICINE

## 2022-12-06 PROCEDURE — G8427 DOCREV CUR MEDS BY ELIG CLIN: HCPCS | Performed by: INTERNAL MEDICINE

## 2022-12-06 NOTE — PATIENT INSTRUCTIONS
RECOMMENDATIONS:  Remote device checks every 3 months. Adjusted in clinic today. Continue current medications. Follow up in 6 months with EP NP.

## 2022-12-06 NOTE — PROGRESS NOTES
[facility]   Electrophysiology Consult Note              Date: 12/6/22  Patient Name: Aguila Osman  YOB: 1970    Primary Care Physician: ERVIN Carnes CNP    CHIEF COMPLAINT:   Chief Complaint   Patient presents with    Follow-up     2 months    Device Check    Coronary Artery Disease     HISTORY OF PRESENT ILLNESS: Aguila Osman is a 47 y.o. male with a PMH significant for CAD, CM, CHF, LV thrombus, AF, and HLD. History of MI in 2012 with thrombectomy, went in to AF after procedure, treated with CV. EF 55-60% on echo. He did not follow up after 2014. He was admitted 2/2020 for shortness of breath. Echo showed EF 20-25%, +apical thrombus. Stress test 2/2020 showed fixed defects, no ischemia. Echo 6/5/2020 showed EF <20%. Echo 11/3/2020 showed EF 25-30%, probable apical thrombus. On 12/18/2020 he had BiV ICD implanted. Echo 6/22/2021 showed EF 15-20%. NSVT has been noted on device interrogations. Today, 12/6/2022, Device interrogation demonstrated AP 11%,  97%, events NSVT, CURTIS 5.3 years. He reports that he is doing well. He is taking his medications as prescribed. Denies recent issues with bleeding or bruising. Patient denies current edema, chest pain, sob, palpitations, dizziness or syncope. Past Medical History:   has a past medical history of CAD (coronary artery disease), Heart attack ([address] and Neck pain. Past Surgical History:   has a past surgical history that includes cervical fusion and Cardiac surgery. Allergies:  Statins    Social History:   reports that he has been smoking cigarettes. He has a 28.00 pack-year smoking history. He has never used smokeless tobacco. He reports that he does not currently use alcohol. He reports that he does not use drugs. Family History: family history includes Diabetes in his mother; Heart Disease in his father and mother.     Home Medications:    Prior to Admission medications    Medication Sig Start Date End Date Taking? Authorizing Provider   apixaban (ELIQUIS) 5 MG TABS tablet Take 1 tablet by mouth 2 times daily 11/23/22  Yes ERVIN Garrido CNP   nystatin (MYCOSTATIN) 841930 UNIT/GM powder Apply 3 times daily. 11/23/22  Yes ERVIN Garrido CNP   dapagliflozin (FARXIGA) 10 MG tablet Take 1 tablet by mouth every morning 9/28/22  Yes ERVIN Bauer CNP   spironolactone (ALDACTONE) 25 MG tablet TAKE 1 TABLET BY MOUTH DAILY 9/13/22  Yes EVRIN Bauer CNP   ENTRESTO 24-26 MG per tablet TAKE 1 TABLET BY MOUTH 2 TIMES DAILY 7/27/22  Yes Allan Keyes MD   atorvastatin (LIPITOR) 40 MG tablet Take 1 tablet by mouth nightly 4/13/22  Yes Allan Keyes MD   carvedilol (COREG) 6.25 MG tablet Take 1 tablet by mouth 2 times daily (with meals) 4/13/22  Yes Allan Keyes MD   furosemide (LASIX) 40 MG tablet Take 1 tablet by mouth 2 times daily TAKE (1) TABLET DAILY 4/13/22  Yes Allan Keyes MD   SPIRIVA HANDIHALER 18 MCG inhalation capsule USE 1 CAPSULE IN HANDIHALER ONCE DAILY 4/9/22  Yes Luciano Chua MD   aspirin 81 MG chewable tablet Take 1 tablet by mouth daily 2/15/20  Yes ERVIN Martinez CNP   traMADol (ULTRAM) 50 MG tablet Take 100 mg by mouth every 8 hours as needed for Pain. Yes Historical Provider, MD       REVIEW OF SYSTEMS:    All 14-point review of systems are completed and  pertinent positives are mentioned in the history of present illness. Other  systems are reviewed and are negative. Physical Examination:    /62   Pulse 72   Ht 6' 2\" (1.88 m)   Wt 227 lb 3.2 oz (103.1 kg)   SpO2 96%   BMI 29.17 kg/m²      Constitutional and General Appearance:    alert, cooperative, no distress, and appears stated age  [de-identified]:    PERRLA, no cervical lymphadenopathy. No masses palpable.  Normal oral  mucosa  Respiratory:  Normal excursion and expansion without use of accessory muscles  Resp Auscultation: Normal breath sounds without dullness or wheezing  Cardiovascular: The apical impulse is not displaced  RRR S1S2 w/o M/G/R  Abdomen:  No masses or tenderness  Bowel sounds present  Extremities:   No Cyanosis or Clubbing   Lower extremity edema: No  Skin: Warm and dry  Neurological:  Alert and oriented. Moves all extremities well  No abnormalities of mood, affect, memory, mentation, or behavior are noted    DATA:    ECG 12/6/22: Personally reviewed. Limited echo 6/2021   Summary   Definity contrast administered. Left ventricular systolic function is reduced with ejection fraction   estimated at 15-20 %. Left ventricular size is severely increased. Normal left ventricular wall thickness. 11/03/2020 25-30% EF, LVE, severe global hypokinesis with regional   variation, probable3 LV Thrombus (Definity)     IMPRESSION:    1. Ischemic cardiomyopathy-EF <20%.  06/11/2020  Patient is a pleasant 58-year-old male with a medical history taken for ischemic cardiomyopathy status post PCI x2, hypertension, hyperlipidemia, and left ventricular thrombus who presents from home for evaluation for possible ICD. Patient has been on goal-directed medical therapy for 90 days since his PCI. He has been compliant with his medications. His left ventricular function continues to be less than 35%. His NYHA class is 2. He has right bundle branch block with a duration of 160 ms. Based on this I think patient be a good candidate for a biventricular ICD. We reviewed risks and benefits. He will consider his options and let me know how he like to proceed. All questions and concerns addressed. 12/18/2020  Patient underwent successful BiV ICD implantation with good positioning. 12/06/2022  Patient presents for follow up. He is having some non-sustained ventricular tachycardia that is asymptomatic. We discussed options including ablation, antiarrhythmic therapy and monitoring for now. Patient would like to continue monitor for now.   I will have him return for

## 2022-12-06 NOTE — PROGRESS NOTES
Patient presents to the device clinic today for a programming evaluation for his defibrillator. Patient has a history of ICM. Takes Eliquis and Coreg. Last device interrogation was on 10/31. Since then, SVT recorded. NSVT events recorded - events on 11/3 appear to all be SVT. P wave: >5 mV  R wave: >12 mV    AP 11% BVP 97%    PVC burden 2.8%    All sensing and pacing parameters are within normal range. No changes need to be made at this time. Patient will see Dr. Cathy Mackay today in office. Patient education was provided about device functionality, in home monitoring, and any other patient questions and/or concerns were addressed. Patient voices understanding. Patient will follow up in 3 months in office or remotely. Please see interrogation for more detail - Paceart report located under the Cardiology tab.

## 2022-12-07 ENCOUNTER — TELEPHONE (OUTPATIENT)
Dept: FAMILY MEDICINE CLINIC | Age: 52
End: 2022-12-07

## 2022-12-07 ENCOUNTER — HOSPITAL ENCOUNTER (OUTPATIENT)
Dept: NON INVASIVE DIAGNOSTICS | Age: 52
Discharge: HOME | End: 2022-12-07
Payer: MEDICAID

## 2022-12-07 DIAGNOSIS — I50.22 CHRONIC SYSTOLIC CONGESTIVE HEART FAILURE (HCC): ICD-10-CM

## 2022-12-07 PROCEDURE — C8929 TTE W OR WO FOL WCON,DOPPLER: HCPCS

## 2022-12-07 PROCEDURE — 6360000004 HC RX CONTRAST MEDICATION: Performed by: INTERNAL MEDICINE

## 2022-12-07 RX ADMIN — PERFLUTREN 1.5 ML: 6.52 INJECTION, SUSPENSION INTRAVENOUS at 09:50

## 2022-12-07 NOTE — PROGRESS NOTES
Definity given for ECHO, pt tolerated well. Dressing applied to IV removal site, bleeding controlled. Pt educated when to remove dressing. Pt left with no signs of distress, resp easy and even.

## 2022-12-15 ENCOUNTER — NURSE ONLY (OUTPATIENT)
Dept: CARDIOLOGY CLINIC | Age: 52
End: 2022-12-15
Payer: MEDICAID

## 2022-12-15 DIAGNOSIS — I25.5 ISCHEMIC CARDIOMYOPATHY: ICD-10-CM

## 2022-12-15 DIAGNOSIS — Z95.810 PRESENCE OF CARDIAC RESYNCHRONIZATION THERAPY DEFIBRILLATOR (CRT-D): Primary | ICD-10-CM

## 2022-12-15 PROCEDURE — 93284 PRGRMG EVAL IMPLANTABLE DFB: CPT | Performed by: INTERNAL MEDICINE

## 2022-12-15 NOTE — PROGRESS NOTES
Patient presents to the device clinic today for a programming evaluation for his defibrillator. Patient has a history of ICM. Takes Eliquis and Coreg. Last device interrogation was on 12/6. Since then, 3 NSVT events recorded. P wave: 3.9 mV  R wave: >12 mV    AP 9.7%  96%    All sensing and pacing parameters are within normal range. Patient education was provided about device functionality, in home monitoring, and any other patient questions and/or concerns were addressed. Patient voices understanding. Patient will follow up in 3 months in office or remotely. Please see interrogation for more detail - Paceart report located under the Cardiology tab.

## 2023-01-03 DIAGNOSIS — I50.21 ACUTE SYSTOLIC (CONGESTIVE) HEART FAILURE (HCC): Primary | ICD-10-CM

## 2023-01-03 DIAGNOSIS — R06.02 SOB (SHORTNESS OF BREATH): ICD-10-CM

## 2023-01-05 RX ORDER — FUROSEMIDE 40 MG/1
TABLET ORAL
Qty: 60 TABLET | Refills: 6 | Status: SHIPPED | OUTPATIENT
Start: 2023-01-05

## 2023-01-16 ENCOUNTER — CLINICAL DOCUMENTATION (OUTPATIENT)
Dept: SPIRITUAL SERVICES | Age: 53
End: 2023-01-16

## 2023-01-16 NOTE — ACP (ADVANCE CARE PLANNING)
Advance Care Planning   Ambulatory ACP Specialist Patient Outreach    Date:  1/16/2023  ACP Specialist:  Adelaide Puga    Outreach call to patient in follow-up to ACP Specialist referral from: ERVIN Rubio CNP    [x] PCP  [] Provider   [] Ambulatory Care Management [] Other for Reason:    [x] Advance Directive Assistance  [] Code Status Discussion  [] Complete Portable DNR Order  [] Discuss Goals of Care  [] Complete POST/MOST  [] Early ACP Decision-Making  [] Other    Date Referral Received:11/23/22    Today's Outreach:  [x] First   [] Second  [] Third                               Third outreach made by []  phone  [] email []   [employer]     Intervention:  [] Spoke with Patient  [x] Left VM requesting return call      Outcome: Outpatient Spiritual Care Services (SCS) team member attempted telephone call to patient. There was no answer and voice message was left encouraging patient to contact Outpatient SCS. Contact information for Outpatient SCS provided. Next Step:   [] ACP scheduled conversation  [x] Outreach again in one week               [] Email / Mail ACP Info Sheets  [] Email / Mail Advance Directive            [] Close Referral. Routing closure to referring provider/staff and to ACP Specialist . [] Closure Letter mailed to Patient with Invitation to Contact ACP Specialist if/when ready.     Thank you for this referral.

## 2023-01-30 ENCOUNTER — NURSE ONLY (OUTPATIENT)
Dept: CARDIOLOGY CLINIC | Age: 53
End: 2023-01-30
Payer: MEDICAID

## 2023-01-30 DIAGNOSIS — Z95.810 PRESENCE OF CARDIAC RESYNCHRONIZATION THERAPY DEFIBRILLATOR (CRT-D): ICD-10-CM

## 2023-01-30 DIAGNOSIS — I25.5 ISCHEMIC CARDIOMYOPATHY: Primary | ICD-10-CM

## 2023-01-30 DIAGNOSIS — I50.22 CHRONIC SYSTOLIC (CONGESTIVE) HEART FAILURE (HCC): ICD-10-CM

## 2023-01-30 PROCEDURE — 93296 REM INTERROG EVL PM/IDS: CPT | Performed by: INTERNAL MEDICINE

## 2023-01-30 PROCEDURE — 93297 REM INTERROG DEV EVAL ICPMS: CPT | Performed by: INTERNAL MEDICINE

## 2023-01-30 PROCEDURE — 93295 DEV INTERROG REMOTE 1/2/MLT: CPT | Performed by: INTERNAL MEDICINE

## 2023-01-31 NOTE — PROGRESS NOTES
End of 91-day monitoring period 1/30/23. Corvue is within normal limits. BP 91%  NSVT and SVT noted. Remote transmission received for patients CRT-D. Transmission shows normal sensing and pacing function. EP physician will review. See interrogation under the cardiology tab in the [address] [place] [address] [address] field for more details. Will continue to monitor remotely. hx NSVT (coreg)  And AT/TWOS. He takes Eliquis coumadin for LV thrombus.

## 2023-02-01 ENCOUNTER — CLINICAL DOCUMENTATION (OUTPATIENT)
Dept: SPIRITUAL SERVICES | Age: 53
End: 2023-02-01

## 2023-02-01 NOTE — ACP (ADVANCE CARE PLANNING)
Advance Care Planning   Ambulatory ACP Specialist Patient Outreach    Date:  2/1/2023  ACP Specialist:  Henrique Gunn    Outreach call to patient in follow-up to ACP Specialist referral from: ERVIN Garrido CNP    [x] PCP  [] Provider   [] Ambulatory Care Management [] Other for Reason:    [x] Advance Directive Assistance  [] Code Status Discussion  [] Complete Portable DNR Order  [] Discuss Goals of Care  [] Complete POST/MOST  [] Early ACP Decision-Making  [] Other    Date Referral Received:11/23/22    Today's Outreach:  [] First   [x] Second  [] Third                               Third outreach made by []  phone  [] email []   [employer]     Intervention:  [] Spoke with Patient  [x] Left VM requesting return call      Outcome: The pt had returned this 's call and left a Voicemail (VM) this morning. I tried to call but no answer & left a VM. I'll try calling him again tomorrow, 2/2/23. Next Step:   [] ACP scheduled conversation  [x] Outreach again in within one week               [] Email / Mail ACP Info Sheets  [] Email / Mail Advance Directive            [] Close Referral. Routing closure to referring provider/staff and to ACP Specialist . [] Closure Letter mailed to Patient with Invitation to Contact ACP Specialist if/when ready.     Thank you for this referral.

## 2023-02-02 DIAGNOSIS — R06.02 SOB (SHORTNESS OF BREATH): ICD-10-CM

## 2023-02-02 DIAGNOSIS — I50.21 ACUTE SYSTOLIC (CONGESTIVE) HEART FAILURE (HCC): ICD-10-CM

## 2023-02-03 RX ORDER — FUROSEMIDE 40 MG/1
TABLET ORAL
Qty: 60 TABLET | Refills: 6 | Status: SHIPPED | OUTPATIENT
Start: 2023-02-03

## 2023-02-17 RX ORDER — SACUBITRIL AND VALSARTAN 24; 26 MG/1; MG/1
TABLET, FILM COATED ORAL
Qty: 60 TABLET | Refills: 1 | Status: SHIPPED | OUTPATIENT
Start: 2023-02-17

## 2023-02-17 RX ORDER — ATORVASTATIN CALCIUM 40 MG/1
TABLET, FILM COATED ORAL
Qty: 30 TABLET | Refills: 0 | Status: SHIPPED | OUTPATIENT
Start: 2023-02-17

## 2023-02-28 ENCOUNTER — ANTI-COAG VISIT (OUTPATIENT)
Dept: PHARMACY | Age: 53
End: 2023-02-28

## 2023-02-28 NOTE — PROGRESS NOTES
Pt was switched to Eliquis for non-compliance with warfarin monitoring, will close coumadin clinic anticoagulation episode

## 2023-03-08 RX ORDER — DAPAGLIFLOZIN 10 MG/1
TABLET, FILM COATED ORAL
Qty: 30 TABLET | Refills: 1 | Status: SHIPPED | OUTPATIENT
Start: 2023-03-08

## 2023-03-20 RX ORDER — SACUBITRIL AND VALSARTAN 24; 26 MG/1; MG/1
TABLET, FILM COATED ORAL
Qty: 90 TABLET | Refills: 3 | Status: SHIPPED | OUTPATIENT
Start: 2023-03-20

## 2023-04-18 DIAGNOSIS — I50.22 CHRONIC SYSTOLIC (CONGESTIVE) HEART FAILURE (HCC): ICD-10-CM

## 2023-04-19 RX ORDER — SPIRONOLACTONE 25 MG/1
TABLET ORAL
Qty: 30 TABLET | Refills: 11 | Status: SHIPPED | OUTPATIENT
Start: 2023-04-19

## 2023-04-19 RX ORDER — SACUBITRIL AND VALSARTAN 24; 26 MG/1; MG/1
TABLET, FILM COATED ORAL
Qty: 60 TABLET | Refills: 11 | Status: SHIPPED | OUTPATIENT
Start: 2023-04-19

## 2023-04-19 NOTE — TELEPHONE ENCOUNTER
4/19 Per pt's appt desk, pt was just in office 4/10/23 @ 8:45 in [facility]) with SMM. Please review pt's request for med refill.

## 2023-05-01 ENCOUNTER — NURSE ONLY (OUTPATIENT)
Dept: CARDIOLOGY CLINIC | Age: 53
End: 2023-05-01

## 2023-05-01 DIAGNOSIS — Z95.810 PRESENCE OF CARDIAC RESYNCHRONIZATION THERAPY DEFIBRILLATOR (CRT-D): ICD-10-CM

## 2023-05-01 DIAGNOSIS — I50.22 CHRONIC SYSTOLIC (CONGESTIVE) HEART FAILURE (HCC): ICD-10-CM

## 2023-05-01 DIAGNOSIS — I25.5 ISCHEMIC CARDIOMYOPATHY: ICD-10-CM

## 2023-05-01 DIAGNOSIS — I50.21 ACUTE SYSTOLIC (CONGESTIVE) HEART FAILURE (HCC): ICD-10-CM

## 2023-05-01 DIAGNOSIS — I51.7 CARDIOMEGALY: Primary | ICD-10-CM

## 2023-05-01 DIAGNOSIS — I51.3 LEFT VENTRICULAR THROMBOSIS: Chronic | ICD-10-CM

## 2023-05-03 NOTE — PROGRESS NOTES
End of 91-day monitoring period 5/1  7.3%AP  91%  Corvue is within normal limits. NSVT and PSVT recorded. Remote transmission received for patients CRT-D. Transmission shows normal sensing and pacing function. EP physician will review. See interrogation under the cardiology tab in the [address] [place] [address] [address] field for more details. Will continue to monitor remotely. hx NSVT (coreg)  And AT/TWOS. He takes Eliquis for LV thrombus.

## 2023-06-23 ENCOUNTER — CLINICAL DOCUMENTATION (OUTPATIENT)
Dept: SPIRITUAL SERVICES | Age: 53
End: 2023-06-23

## 2023-06-23 NOTE — ACP (ADVANCE CARE PLANNING)
Advance Care Planning   Ambulatory ACP Specialist Patient Outreach    Date:  6/23/2023  ACP Specialist:  Dilcia Sanders    Outreach call to patient in follow-up to ACP Specialist referral from: ERVIN Agudelo CNP    [x] PCP  [] Provider   [] Ambulatory Care Management [] Other for Reason:    [x] Advance Directive Assistance  [] Code Status Discussion  [] Complete Portable DNR Order  [] Discuss Goals of Care  [] Complete POST/MOST  [] Early ACP Decision-Making  [] Other    Date Referral Received:11/23/22    Today's Outreach:  [] First   [] Second  [x] Third                               Third outreach made by [x]  phone  [] email []   T[employer]     Intervention:  [] Spoke with Patient  [x] Left VM requesting return call      Outcome:Final ACP attempt. Mailing forms and left voicemail for Pt. Next Step:   [] ACP scheduled conversation  [] Outreach again in one week               [x] Email / Mail ACP Info Sheets  [x] Email / Mail Advance Directive            [x] Close Referral. Routing closure to referring provider/staff and to ACP Specialist . [] Closure Letter mailed to Patient with Invitation to Contact ACP Specialist if/when ready.     Thank you for this referral.

## 2023-06-29 ENCOUNTER — NURSE ONLY (OUTPATIENT)
Dept: CARDIOLOGY CLINIC | Age: 53
End: 2023-06-29

## 2023-07-31 ENCOUNTER — ANTI-COAG VISIT (OUTPATIENT)
Dept: PHARMACY | Age: 53
End: 2023-07-31
Payer: MEDICAID

## 2023-07-31 ENCOUNTER — NURSE ONLY (OUTPATIENT)
Dept: CARDIOLOGY CLINIC | Age: 53
End: 2023-07-31
Payer: MEDICAID

## 2023-07-31 DIAGNOSIS — I50.22 CHRONIC SYSTOLIC (CONGESTIVE) HEART FAILURE (HCC): Primary | ICD-10-CM

## 2023-07-31 DIAGNOSIS — I25.5 ISCHEMIC CARDIOMYOPATHY: ICD-10-CM

## 2023-07-31 DIAGNOSIS — Z95.810 PRESENCE OF CARDIAC RESYNCHRONIZATION THERAPY DEFIBRILLATOR (CRT-D): ICD-10-CM

## 2023-07-31 DIAGNOSIS — I51.7 CARDIOMEGALY: ICD-10-CM

## 2023-07-31 LAB — INTERNATIONAL NORMALIZATION RATIO, POC: 2.7

## 2023-07-31 PROCEDURE — 93297 REM INTERROG DEV EVAL ICPMS: CPT | Performed by: NURSE PRACTITIONER

## 2023-07-31 PROCEDURE — 93295 DEV INTERROG REMOTE 1/2/MLT: CPT | Performed by: INTERNAL MEDICINE

## 2023-07-31 PROCEDURE — 93296 REM INTERROG EVL PM/IDS: CPT | Performed by: INTERNAL MEDICINE

## 2023-07-31 PROCEDURE — 99211 OFF/OP EST MAY X REQ PHY/QHP: CPT | Performed by: PHARMACIST

## 2023-07-31 PROCEDURE — 85610 PROTHROMBIN TIME: CPT | Performed by: PHARMACIST

## 2023-07-31 NOTE — PROGRESS NOTES
MrPatrick Zelaya is here for management of anticoagulation for Left Ventricular Assist Device  PMH also significant for CAD, CHF. He presents today w/out complaint. Pt verifies dosing regimen as listed above. Pt denies s/s bleeding/bruising/swelling/SOB. No BRBPR. No melena. Address missed doses  Reviewed pt medication list  No changes in RX/OTCs/Herbal medications. Reviewed dietary concerns  Address EToH and tobacco use. He was off warfarin for a while but restarted warfarin in June after LVAD placement surgery. Has been having INR checked by [facility] but that is ending. Has been taking 3.75 mg daily for the past several days, before that had been on a mix of 7.5 mg and 3.75 mg. Surgeon -Dr Sloan Doe. Pt has been managed by VINCE Goncalves Early at [address] clinic so far. I called him regarding patient, they plan on continuing to monitor and manage his warfarin dosing. He had actually sent standing orders to the lab but pt came to clinic instead. He will call the patient to manage dosing and follow up. I had given patient directions before calling LVAD clinic, discussed with RN and they will continue to manage dosing. Likely best if patient is to be managed by LVAD clinic for pt to go to lab for INR draw. Had scheduled pt for next week, will call pt to review care plan. INR 2.7 is within therapeutic range of 2-3. Recommend to take 3.75 mg daily except 7.5 mg Q Wed. (Pt to receive further instructions from LVAD RN.)  Patient has 5 and 7.5 mg tablets. Will continue to monitor and check INR in 1 weeks. Dosing reminder card given with phone number, appointment date and time.    Return to clinic: 8/7/23 @ 4:00 pm    Adithya Champagne PharmD 4:08 PM EDT 7/31/23

## 2023-07-31 NOTE — PROGRESS NOTES
Remote transmission of pacemaker and/or ICD, or implanted heart monitor shows normal cardiac device function. Patient's last device interrogation was on 6/29. Estimated device longevity is 4.9 years. Patient has a history of ICM. Takes Eliquis and Coreg. AP 0.0%   11%    P wave N/A  R wave 7.4 mV    Since last device interrogation, AT/AF events recorded with overall burden >99%. CorVue near reference. SUSIE CORCORAN to review. Patient is to follow up in 3 months either remotely or in clinic. Please see the Paceart report under the Cardiology tab for more detail.

## 2023-08-02 ENCOUNTER — TELEPHONE (OUTPATIENT)
Dept: CARDIOLOGY CLINIC | Age: 53
End: 2023-08-02

## 2023-08-02 NOTE — TELEPHONE ENCOUNTER
----- Message from Juan M Turner MD sent at 8/2/2023 12:03 PM EDT -----  Reviewed. Patient needs follow up sooner than expected. Can we add him to my add on clinic and ask Martin to be there to interrogate and review data with me please.

## 2023-08-02 NOTE — TELEPHONE ENCOUNTER
I attempted to call the patient. When patient returns call please let him know that his interrogation showed rapid heart rates and TAMARAK would like to see him on 08/25/2023 at 10:30 am. When patient is scheduled please notify Indra Briscoe and ANGELA to ensure we contact Sarthak Nazario Dr. Thank you.

## 2023-08-04 DIAGNOSIS — I25.10 CORONARY ARTERY DISEASE INVOLVING NATIVE CORONARY ARTERY OF NATIVE HEART WITHOUT ANGINA PECTORIS: ICD-10-CM

## 2023-08-07 ENCOUNTER — ANTI-COAG VISIT (OUTPATIENT)
Dept: PHARMACY | Age: 53
End: 2023-08-07
Payer: MEDICAID

## 2023-08-07 DIAGNOSIS — Z95.811 LEFT VENTRICULAR ASSIST DEVICE PRESENT (HCC): Primary | ICD-10-CM

## 2023-08-07 LAB — INTERNATIONAL NORMALIZATION RATIO, POC: 2.2

## 2023-08-07 PROCEDURE — 85610 PROTHROMBIN TIME: CPT | Performed by: PHARMACIST

## 2023-08-07 PROCEDURE — 99211 OFF/OP EST MAY X REQ PHY/QHP: CPT | Performed by: PHARMACIST

## 2023-08-07 RX ORDER — ATORVASTATIN CALCIUM 80 MG/1
TABLET, FILM COATED ORAL
Qty: 90 TABLET | Refills: 3 | Status: SHIPPED | OUTPATIENT
Start: 2023-08-07

## 2023-08-07 NOTE — PROGRESS NOTES
Mr. Jarrell Diana is here for management of anticoagulation for Left Ventricular Assist Device  PMH also significant for CAD, CHF. He presents today w/out complaint. Pt verifies dosing regimen as listed above. Pt denies s/s bleeding/bruising/swelling/SOB. No BRBPR. No melena. Address missed doses  Reviewed pt medication list  No changes in RX/OTCs/Herbal medications. Reviewed dietary concerns  Address EToH and tobacco use. He was off warfarin for a while but restarted warfarin in June after LVAD placement surgery. Has been having INR checked by [address] but that is ending. Surgeon -Dr Yared Arteaga. Pt has been managed by RN Jj Guzmán at [facility] clinic so far. I called him regarding patient, they plan on continuing to monitor and manage his warfarin dosing. He had actually sent standing orders to the lab but pt came to clinic instead. He will call the patient to manage dosing and follow up. Pt came back to clinic today. He prefers to have fingerstick rather than lab draw. He is scheduled to be seen at [address] clinic on Wed. They will check INR again there and he will discuss plan with them regarding INR management going forward. Pt provided with clinic phone number. He will plan to call clinic after visit on Wed to set up appointment if necessary, otherwise plan to have LVAD clinic manage. INR still within range but has decreased some since last week. Will have him take 7.5 mg today then continue 3.75 mg until seen at LVAD clinic Wed, with further dosing by them. INR 2.2 is within therapeutic range of 2-3. Recommend to take 3.75 mg daily except 7.5 mg Q  Mon. (Pt to receive further instructions from LVAD RN.)  Patient has 5 and 7.5 mg tablets. Will continue to monitor and check INR in 1 weeks. Dosing reminder card given with phone number, appointment date and time. Return to clinic: pt to call.     Ilene Lui, PharmD 4:26 PM EDT 8/7/23

## 2023-08-16 ENCOUNTER — HOSPITAL ENCOUNTER (OUTPATIENT)
Dept: CARDIAC REHAB | Age: 53
Setting detail: THERAPIES SERIES
Discharge: HOME | End: 2023-08-16

## 2023-08-16 RX ORDER — ENOXAPARIN SODIUM 100 MG/ML
100 INJECTION SUBCUTANEOUS EVERY 12 HOURS
COMMUNITY
Start: 2023-06-29

## 2023-08-16 RX ORDER — ATORVASTATIN CALCIUM 40 MG/1
TABLET, FILM COATED ORAL
COMMUNITY
Start: 2023-06-29

## 2023-08-16 RX ORDER — GABAPENTIN 300 MG/1
300 CAPSULE ORAL 3 TIMES DAILY
Qty: 90 CAPSULE | Refills: 1 | COMMUNITY
Start: 2023-07-31 | End: 2023-09-29

## 2023-08-16 RX ORDER — RANOLAZINE 500 MG/1
500 TABLET, EXTENDED RELEASE ORAL 2 TIMES DAILY
COMMUNITY
Start: 2023-07-26

## 2023-08-16 RX ORDER — LIDOCAINE 50 MG/G
PATCH TOPICAL
COMMUNITY
Start: 2023-07-26 | End: 2023-08-25

## 2023-08-16 RX ORDER — TORSEMIDE 20 MG/1
20 TABLET ORAL DAILY
COMMUNITY
Start: 2023-07-12

## 2023-08-21 ENCOUNTER — HOSPITAL ENCOUNTER (OUTPATIENT)
Age: 53
Discharge: HOME | End: 2023-08-21
Payer: MEDICAID

## 2023-08-21 LAB
INR PPP: 2 (ref 0.84–1.16)
PROTHROMBIN TIME: 22.6 SEC (ref 11.5–14.8)

## 2023-08-21 PROCEDURE — 36415 COLL VENOUS BLD VENIPUNCTURE: CPT

## 2023-08-21 PROCEDURE — 85610 PROTHROMBIN TIME: CPT

## 2023-08-25 NOTE — TELEPHONE ENCOUNTER
med rec needs verified he is taking Eliquis. office attempted to contact pt for OV AJK 8/25-unable to reach pt. care everywhere shows he was inpatient at [facility] 8/21/23. seeing we got Merlin remote 8/25 he may be at home now. Pt has an ABBOTT/ST [address] [place]. * Stored EGMs are consistent with or suggestive of Atrial Fibrillation with Rapid Ventricular Response  * AT/AF [place]: 99%    Amio, Toprol XL and anticoags were discontinued 6/2023, pt had LVAD @ [facility] 6/2023. as of 8/25/23 only on Coreg, MARI noted in EPIC med rec. 8/7/2023  [facility]-apixaban (ELIQUIS) 5 MG TABS tablet Take 1 tablet by mouth 2 times daily    See Mariam Diaz report under cardiology tab once EP reviews.

## 2023-08-28 ENCOUNTER — HOSPITAL ENCOUNTER (OUTPATIENT)
Age: 53
Discharge: HOME | End: 2023-08-28
Payer: MEDICAID

## 2023-08-28 LAB
INR PPP: 2 (ref 0.84–1.16)
PROTHROMBIN TIME: 22.6 SEC (ref 11.5–14.8)

## 2023-08-28 PROCEDURE — 85610 PROTHROMBIN TIME: CPT

## 2023-08-28 PROCEDURE — 36415 COLL VENOUS BLD VENIPUNCTURE: CPT

## 2023-09-05 ENCOUNTER — HOSPITAL ENCOUNTER (OUTPATIENT)
Age: 53
Discharge: HOME | End: 2023-09-05
Payer: MEDICAID

## 2023-09-05 LAB
INR PPP: 2.5 (ref 0.84–1.16)
PROTHROMBIN TIME: 26.8 SEC (ref 11.5–14.8)

## 2023-09-05 PROCEDURE — 36415 COLL VENOUS BLD VENIPUNCTURE: CPT

## 2023-09-05 PROCEDURE — 85610 PROTHROMBIN TIME: CPT

## 2023-09-11 ENCOUNTER — HOSPITAL ENCOUNTER (OUTPATIENT)
Age: 53
Discharge: HOME | End: 2023-09-11
Payer: MEDICAID

## 2023-09-11 LAB
INR PPP: 2.66 (ref 0.84–1.16)
PROTHROMBIN TIME: 28.2 SEC (ref 11.5–14.8)

## 2023-09-11 PROCEDURE — 85610 PROTHROMBIN TIME: CPT

## 2023-09-14 ENCOUNTER — HOSPITAL ENCOUNTER (OUTPATIENT)
Dept: CARDIAC REHAB | Age: 53
Setting detail: THERAPIES SERIES
Discharge: HOME | End: 2023-09-14
Payer: MEDICAID

## 2023-09-14 VITALS — HEIGHT: 74 IN | WEIGHT: 232 LBS | BODY MASS INDEX: 29.77 KG/M2 | OXYGEN SATURATION: 97 % | RESPIRATION RATE: 18 BRPM

## 2023-09-14 PROCEDURE — 93798 PHYS/QHP OP CAR RHAB W/ECG: CPT

## 2023-09-14 RX ORDER — METOPROLOL SUCCINATE 25 MG/1
25 TABLET, EXTENDED RELEASE ORAL DAILY
COMMUNITY
Start: 2023-07-26

## 2023-09-14 RX ORDER — PANTOPRAZOLE SODIUM 40 MG/1
40 TABLET, DELAYED RELEASE ORAL DAILY
COMMUNITY
Start: 2023-07-26

## 2023-09-14 RX ORDER — WARFARIN SODIUM 7.5 MG/1
TABLET ORAL
COMMUNITY
Start: 2023-06-29

## 2023-09-14 RX ORDER — AMIODARONE HYDROCHLORIDE 200 MG/1
TABLET ORAL
COMMUNITY
Start: 2023-07-26

## 2023-09-14 RX ORDER — TRAZODONE HYDROCHLORIDE 50 MG/1
50 TABLET ORAL NIGHTLY PRN
COMMUNITY
Start: 2023-08-09

## 2023-09-14 RX ORDER — WARFARIN SODIUM 5 MG/1
TABLET ORAL
COMMUNITY
Start: 2023-07-26

## 2023-09-14 RX ORDER — SILDENAFIL CITRATE 20 MG/1
20 TABLET ORAL 3 TIMES DAILY
COMMUNITY
Start: 2023-07-19

## 2023-09-14 SDOH — ECONOMIC STABILITY: INCOME INSECURITY: IN THE LAST 12 MONTHS, WAS THERE A TIME WHEN YOU WERE NOT ABLE TO PAY THE MORTGAGE OR RENT ON TIME?: YES

## 2023-09-14 SDOH — ECONOMIC STABILITY: FOOD INSECURITY: WITHIN THE PAST 12 MONTHS, THE FOOD YOU BOUGHT JUST DIDN'T LAST AND YOU DIDN'T HAVE MONEY TO GET MORE.: NEVER TRUE

## 2023-09-14 SDOH — HEALTH STABILITY: PHYSICAL HEALTH: ON AVERAGE, HOW MANY MINUTES DO YOU ENGAGE IN EXERCISE AT THIS LEVEL?: 0 MIN

## 2023-09-14 SDOH — HEALTH STABILITY: PHYSICAL HEALTH: ON AVERAGE, HOW MANY DAYS PER WEEK DO YOU ENGAGE IN MODERATE TO STRENUOUS EXERCISE (LIKE A BRISK WALK)?: 0 DAYS

## 2023-09-14 SDOH — ECONOMIC STABILITY: TRANSPORTATION INSECURITY
IN THE PAST 12 MONTHS, HAS LACK OF TRANSPORTATION KEPT YOU FROM MEETINGS, WORK, OR FROM GETTING THINGS NEEDED FOR DAILY LIVING?: NO

## 2023-09-14 SDOH — ECONOMIC STABILITY: HOUSING INSECURITY: IN THE LAST 12 MONTHS, HOW MANY PLACES HAVE YOU LIVED?: 1

## 2023-09-14 SDOH — ECONOMIC STABILITY: TRANSPORTATION INSECURITY
IN THE PAST 12 MONTHS, HAS THE LACK OF TRANSPORTATION KEPT YOU FROM MEDICAL APPOINTMENTS OR FROM GETTING MEDICATIONS?: NO

## 2023-09-14 SDOH — ECONOMIC STABILITY: HOUSING INSECURITY
IN THE LAST 12 MONTHS, WAS THERE A TIME WHEN YOU DID NOT HAVE A STEADY PLACE TO SLEEP OR SLEPT IN A SHELTER (INCLUDING NOW)?: NO

## 2023-09-14 SDOH — ECONOMIC STABILITY: FOOD INSECURITY: WITHIN THE PAST 12 MONTHS, YOU WORRIED THAT YOUR FOOD WOULD RUN OUT BEFORE YOU GOT MONEY TO BUY MORE.: SOMETIMES TRUE

## 2023-09-14 ASSESSMENT — EXERCISE STRESS TEST: PEAK_HR: 97

## 2023-09-14 ASSESSMENT — SOCIAL DETERMINANTS OF HEALTH (SDOH)
WITHIN THE LAST YEAR, HAVE TO BEEN RAPED OR FORCED TO HAVE ANY KIND OF SEXUAL ACTIVITY BY YOUR PARTNER OR EX-PARTNER?: NO
WITHIN THE LAST YEAR, HAVE YOU BEEN AFRAID OF YOUR PARTNER OR EX-PARTNER?: NO
HOW OFTEN DO YOU ATTEND CHURCH OR RELIGIOUS SERVICES?: NEVER
DO YOU BELONG TO ANY CLUBS OR ORGANIZATIONS SUCH AS CHURCH GROUPS UNIONS, FRATERNAL OR ATHLETIC GROUPS, OR SCHOOL GROUPS?: YES
HOW HARD IS IT FOR YOU TO PAY FOR THE VERY BASICS LIKE FOOD, HOUSING, MEDICAL CARE, AND HEATING?: VERY HARD
HOW OFTEN DO YOU GET TOGETHER WITH FRIENDS OR RELATIVES?: TWICE A WEEK
WITHIN THE LAST YEAR, HAVE YOU BEEN HUMILIATED OR EMOTIONALLY ABUSED IN OTHER WAYS BY YOUR PARTNER OR EX-PARTNER?: NO
HOW OFTEN DO YOU ATTENT MEETINGS OF THE CLUB OR ORGANIZATION YOU BELONG TO?: 1 TO 4 TIMES PER YEAR
WITHIN THE LAST YEAR, HAVE YOU BEEN KICKED, HIT, SLAPPED, OR OTHERWISE PHYSICALLY HURT BY YOUR PARTNER OR EX-PARTNER?: NO
IN A TYPICAL WEEK, HOW MANY TIMES DO YOU TALK ON THE PHONE WITH FAMILY, FRIENDS, OR NEIGHBORS?: TWICE A WEEK

## 2023-09-14 ASSESSMENT — PATIENT HEALTH QUESTIONNAIRE - PHQ9
10. IF YOU CHECKED OFF ANY PROBLEMS, HOW DIFFICULT HAVE THESE PROBLEMS MADE IT FOR YOU TO DO YOUR WORK, TAKE CARE OF THINGS AT HOME, OR GET ALONG WITH OTHER PEOPLE: VERY DIFFICULT
9. THOUGHTS THAT YOU WOULD BE BETTER OFF DEAD, OR OF HURTING YOURSELF: NOT AT ALL
3. TROUBLE FALLING OR STAYING ASLEEP: 3
8. MOVING OR SPEAKING SO SLOWLY THAT OTHER PEOPLE COULD HAVE NOTICED. OR THE OPPOSITE, BEING SO FIGETY OR RESTLESS THAT YOU HAVE BEEN MOVING AROUND A LOT MORE THAN USUAL: NOT AT ALL
SUM OF ALL RESPONSES TO PHQ QUESTIONS 1-9: 13
SUM OF ALL RESPONSES TO PHQ QUESTIONS 1-9: 13
7. TROUBLE CONCENTRATING ON THINGS, SUCH AS READING THE NEWSPAPER OR WATCHING TELEVISION: SEVERAL DAYS
SUM OF ALL RESPONSES TO PHQ QUESTIONS 1-9: 13
1. LITTLE INTEREST OR PLEASURE IN DOING THINGS: 2
SUM OF ALL RESPONSES TO PHQ QUESTIONS 1-9: 13
7. TROUBLE CONCENTRATING ON THINGS, SUCH AS READING THE NEWSPAPER OR WATCHING TELEVISION: 0
5. POOR APPETITE OR OVEREATING: MORE THAN HALF THE DAYS
2. FEELING DOWN, DEPRESSED OR HOPELESS: MORE THAN HALF THE DAYS
SUM OF ALL RESPONSES TO PHQ9 QUESTIONS 1 & 2: 4
3. TROUBLE FALLING OR STAYING ASLEEP: MORE THAN HALF THE DAYS
10. IF YOU CHECKED OFF ANY PROBLEMS, HOW DIFFICULT HAVE THESE PROBLEMS MADE IT FOR YOU TO DO YOUR WORK, TAKE CARE OF THINGS AT HOME, OR GET ALONG WITH OTHER PEOPLE: 3
4. FEELING TIRED OR HAVING LITTLE ENERGY: NEARLY EVERY DAY
4. FEELING TIRED OR HAVING LITTLE ENERGY: 3
SUM OF ALL RESPONSES TO PHQ QUESTIONS 1-9: 14
6. FEELING BAD ABOUT YOURSELF - OR THAT YOU ARE A FAILURE OR HAVE LET YOURSELF OR YOUR FAMILY DOWN: MORE THAN HALF THE DAYS
8. MOVING OR SPEAKING SO SLOWLY THAT OTHER PEOPLE COULD HAVE NOTICED. OR THE OPPOSITE, BEING SO FIGETY OR RESTLESS THAT YOU HAVE BEEN MOVING AROUND A LOT MORE THAN USUAL: 0
9. THOUGHTS THAT YOU WOULD BE BETTER OFF DEAD, OR OF HURTING YOURSELF: 0
1. LITTLE INTEREST OR PLEASURE IN DOING THINGS: MORE THAN HALF THE DAYS
6. FEELING BAD ABOUT YOURSELF - OR THAT YOU ARE A FAILURE OR HAVE LET YOURSELF OR YOUR FAMILY DOWN: 2
5. POOR APPETITE OR OVEREATING: 1
2. FEELING DOWN, DEPRESSED OR HOPELESS: 2
SUM OF ALL RESPONSES TO PHQ9 QUESTIONS 1 & 2: 4

## 2023-09-14 ASSESSMENT — LIFESTYLE VARIABLES
HOW MANY STANDARD DRINKS CONTAINING ALCOHOL DO YOU HAVE ON A TYPICAL DAY: PATIENT DOES NOT DRINK
HOW OFTEN DO YOU HAVE A DRINK CONTAINING ALCOHOL: NEVER

## 2023-09-14 ASSESSMENT — EJECTION FRACTION: EF_VALUE: 20

## 2023-09-14 NOTE — PLAN OF CARE
Cardiopulmonary Rehab Treatment Plan   Name: Vamsi Petty Assessment Date: 2023   : 1970 Primary Diagnosis: Treatment Diagnosis 1: HF (LVAD 6/15/23)      Age: 47 y.o.  Referring Physician:  Lilliam Hendrix   MRN: [id number] Primary Care Physician: ERVIN Carl CNP   Treatment Diagnosis  Treatment Diagnosis 1: HF (LVAD 6/15/23)  Referral Date: 08/10/23  Significant Cardiovascular History: History of heart failure  Co-morbidities: Previous MI    Individual Treatment Plan  ITP Visit Type: Initial assessment  1st Date of Exercise : 23  Visit #/Total Visits:   EF%: 0 % (20-25%)  Risk Stratification: High  ITP: Exercise; Psychosocial; Nutrition; Education    Exercise   Stages of Change: Pre-contemplation  Assisted Devices: None  Griffiths Total Score: 15  Test: Six minute walk test    Data Measured Before Walk  Heart Rate: 89  Blood Pressure: -- (doppler sbp 106)  O2 Saturation: 97  O2 Device: Room air  RPD: 2  RPE: 0    Data Measured Immediately After Walk  Distance Walked in : ft  Distance Walked (ft): 1150 ft  Peak Heart Rate: 97  Peak Blood Pressure: -- (doppler [id number])  Modified Divine Scale: 2  RPE: 11  O2 Saturation: 97%    Data Measured at 5 Minutes After Walk  Heart Rate: 95  Blood Pressure: -- (doppler sbp 110)  SpO2: 98 %  O2 Device: Room air    Exercise Prescription  Mode: Track; Treadmill; Bike; Stepper; Ergometer; Elliptical; Rower  Frequency per week: 3  Duration Per Session: 20-36+ MINUTES  Intensity METS      : 2.0 OR >  RPE: 11-14  Target Heart Rate: RHR +20-30 unless on a beta blocker  Resistance Training: Yes    Exercise Blood Pressures  Resting BP: doppler   Peak BP: doppler sbp 110  Is BP WDL: Yes    Exercise Activity at Home  Type: none  Resistance Training: No    Exercise Education  Education: Self pulse; Exercise safety; Signs/symptoms to report; RPE scale      Social Determinants of Health     Tobacco Use: High Risk (2023)    Patient History

## 2023-09-14 NOTE — PROGRESS NOTES
Pt initial assessment completed for start of cardiac rehab. Pt PHQ 9 score >10. Per policy, MD to be notified. Noted pt started on zoloft and trazadone at recent visit with cardiologist and per pt, is following up with psychiatry on Monday, 9/18/23.

## 2023-09-15 ENCOUNTER — TELEPHONE (OUTPATIENT)
Dept: NON INVASIVE DIAGNOSTICS | Age: 53
End: 2023-09-15

## 2023-09-18 ENCOUNTER — HOSPITAL ENCOUNTER (OUTPATIENT)
Age: 53
Discharge: HOME | End: 2023-09-18
Payer: MEDICAID

## 2023-09-18 ENCOUNTER — HOSPITAL ENCOUNTER (OUTPATIENT)
Dept: CARDIAC REHAB | Age: 53
Setting detail: THERAPIES SERIES
Discharge: HOME | End: 2023-09-18
Payer: MEDICAID

## 2023-09-18 LAB
INR PPP: 2.76 (ref 0.84–1.16)
PROTHROMBIN TIME: 29 SEC (ref 11.5–14.8)

## 2023-09-18 PROCEDURE — 85610 PROTHROMBIN TIME: CPT

## 2023-09-18 PROCEDURE — 93798 PHYS/QHP OP CAR RHAB W/ECG: CPT

## 2023-09-18 PROCEDURE — 36415 COLL VENOUS BLD VENIPUNCTURE: CPT

## 2023-09-18 RX ORDER — SERTRALINE HYDROCHLORIDE 100 MG/1
100 TABLET, FILM COATED ORAL DAILY
COMMUNITY
Start: 2023-09-18

## 2023-09-18 RX ORDER — QUETIAPINE FUMARATE 25 MG/1
TABLET, FILM COATED ORAL
COMMUNITY
Start: 2023-09-18

## 2023-09-18 NOTE — CARDIO/PULMONARY
Cardiopulmonary Rehab Daily Exercise Plan     Name: Rehan Leal MRN [id number]   : 1970         Modality Minutes Setting   Warm up on: With Group and Track 3 laps- 5 minutes total    Track     2nd Arm Ergometer 15 mins Interval (3.0 - 5.0)    1st Sci-Fit 15 mins Hill ( Level 2.0)    NuStep      Stepper      Rowing Machine      Recumbent Elliptical      Recumbent Bike      Airdyne      Treadmill                        Bands _____ sets of _____ with _____ resistance    Free Weights __1__ sets of __10___ with __2__ lb. weights    Sit to stands _1__ sets of __15___ with ___0__ lb. Fitness Goals:     *Pt main goal is to carry LVAD battery bag (5.4 lbs) for longer than 20 lbs without getting tired. *Second goal is to build strength in legs.        Electronically signed by Mer Guerrero on 2023 at 5:16 PM

## 2023-09-20 ENCOUNTER — HOSPITAL ENCOUNTER (OUTPATIENT)
Dept: CARDIAC REHAB | Age: 53
Setting detail: THERAPIES SERIES
Discharge: HOME | End: 2023-09-20
Payer: MEDICAID

## 2023-09-20 PROCEDURE — 93798 PHYS/QHP OP CAR RHAB W/ECG: CPT

## 2023-09-22 ENCOUNTER — HOSPITAL ENCOUNTER (OUTPATIENT)
Dept: CARDIAC REHAB | Age: 53
Setting detail: THERAPIES SERIES
Discharge: HOME | End: 2023-09-22
Payer: MEDICAID

## 2023-09-22 PROCEDURE — 93798 PHYS/QHP OP CAR RHAB W/ECG: CPT

## 2023-09-25 ENCOUNTER — TELEPHONE (OUTPATIENT)
Dept: CARDIOLOGY CLINIC | Age: 53
End: 2023-09-25

## 2023-09-25 ENCOUNTER — HOSPITAL ENCOUNTER (OUTPATIENT)
Age: 53
Discharge: HOME | End: 2023-09-25
Payer: MEDICAID

## 2023-09-25 ENCOUNTER — HOSPITAL ENCOUNTER (OUTPATIENT)
Dept: CARDIAC REHAB | Age: 53
Setting detail: THERAPIES SERIES
Discharge: HOME | End: 2023-09-25
Payer: MEDICAID

## 2023-09-25 LAB
INR PPP: 2.88 (ref 0.84–1.16)
PROTHROMBIN TIME: 30 SEC (ref 11.5–14.8)

## 2023-09-25 PROCEDURE — 85610 PROTHROMBIN TIME: CPT

## 2023-09-25 PROCEDURE — 93798 PHYS/QHP OP CAR RHAB W/ECG: CPT

## 2023-09-25 PROCEDURE — 36415 COLL VENOUS BLD VENIPUNCTURE: CPT

## 2023-09-25 NOTE — PROGRESS NOTES
Called the LVAD clinic to update on recover  and recheck 102. Patient has been feeling weak and not eating well. Left voicemail requesting call back.

## 2023-09-27 ENCOUNTER — HOSPITAL ENCOUNTER (OUTPATIENT)
Dept: CARDIAC REHAB | Age: 53
Setting detail: THERAPIES SERIES
Discharge: HOME | End: 2023-09-27
Payer: MEDICAID

## 2023-09-27 PROCEDURE — 93798 PHYS/QHP OP CAR RHAB W/ECG: CPT

## 2023-09-29 ENCOUNTER — HOSPITAL ENCOUNTER (OUTPATIENT)
Dept: CARDIAC REHAB | Age: 53
Setting detail: THERAPIES SERIES
Discharge: HOME | End: 2023-09-29
Payer: MEDICAID

## 2023-09-29 ENCOUNTER — TELEPHONE (OUTPATIENT)
Dept: NON INVASIVE DIAGNOSTICS | Age: 53
End: 2023-09-29

## 2023-09-29 NOTE — TELEPHONE ENCOUNTER
Please let patient know BiV pacing not at goal.  I would suggest discussing with [facility] team to see if would benefit for AVN ablation.

## 2023-10-02 ENCOUNTER — HOSPITAL ENCOUNTER (OUTPATIENT)
Age: 53
Discharge: HOME | End: 2023-10-02
Payer: MEDICAID

## 2023-10-02 ENCOUNTER — HOSPITAL ENCOUNTER (OUTPATIENT)
Dept: CARDIAC REHAB | Age: 53
Setting detail: THERAPIES SERIES
Discharge: HOME | End: 2023-10-02
Payer: MEDICAID

## 2023-10-02 LAB
INR PPP: 1.9 (ref 0.84–1.16)
PROTHROMBIN TIME: 21.7 SEC (ref 11.5–14.8)

## 2023-10-02 PROCEDURE — 85610 PROTHROMBIN TIME: CPT

## 2023-10-02 PROCEDURE — 93798 PHYS/QHP OP CAR RHAB W/ECG: CPT

## 2023-10-02 PROCEDURE — 36415 COLL VENOUS BLD VENIPUNCTURE: CPT

## 2023-10-04 ENCOUNTER — APPOINTMENT (OUTPATIENT)
Dept: CARDIAC REHAB | Age: 53
End: 2023-10-04
Payer: MEDICAID

## 2023-10-06 ENCOUNTER — HOSPITAL ENCOUNTER (OUTPATIENT)
Dept: CARDIAC REHAB | Age: 53
Setting detail: THERAPIES SERIES
Discharge: HOME | End: 2023-10-06
Payer: MEDICAID

## 2023-10-06 PROCEDURE — 93798 PHYS/QHP OP CAR RHAB W/ECG: CPT

## 2023-10-06 NOTE — PROGRESS NOTES
Patient post doppler blood pressure 110, patient asymptomatic. Patient reported he would continue to monitor doppler blood pressure at home and call if out of range. Called the LVAD clinic and spoke with Bev and as long as patient is asymptomatic he is okay with 110.
9

## 2023-10-09 ENCOUNTER — HOSPITAL ENCOUNTER (OUTPATIENT)
Age: 53
Discharge: HOME | End: 2023-10-09
Payer: MEDICAID

## 2023-10-09 ENCOUNTER — HOSPITAL ENCOUNTER (OUTPATIENT)
Dept: CARDIAC REHAB | Age: 53
Setting detail: THERAPIES SERIES
Discharge: HOME | End: 2023-10-09
Payer: MEDICAID

## 2023-10-09 LAB
INR PPP: 1.96 (ref 0.84–1.16)
PROTHROMBIN TIME: 22.2 SEC (ref 11.5–14.8)

## 2023-10-09 PROCEDURE — 93798 PHYS/QHP OP CAR RHAB W/ECG: CPT

## 2023-10-09 PROCEDURE — 85610 PROTHROMBIN TIME: CPT

## 2023-10-09 PROCEDURE — 36415 COLL VENOUS BLD VENIPUNCTURE: CPT

## 2023-10-09 NOTE — PLAN OF CARE
vegatables and fruit    Education  Learning Barrier: Ready to learn    Education Target Goals  Target Goals: Risk factors; Understand target guidelines for B/P    Patient Education   Education: Risk factors  Hypertension: Yes  Hypertension Controlled: Yes  Is BP WDL: Yes  Med(s) Change: No  BP Meds: coreg      Education Target Goals  Target Goals: Risk factors; Understand target guidelines for B/P    Staff Treatment Goals  Goals: Exercise; Functional capacity; Blood pressure; Psychosocial; Tobacco; Nutrition        Provider Review and Approval of this ITP    The above treatment plan and goals have been set for your patient during Cardiac Rehabilitation.  Please review and Electronically Cosign        Electronically signed by Aspen Franklin RN on 10/9/23 at 4:16 PM EDT

## 2023-10-09 NOTE — PROGRESS NOTES
Patient resting doppler blood pressure 100. Patient asymptomatic. Called the LVAD clinic and patient is okay to exercise as long as he is asymptomatic.

## 2023-10-11 ENCOUNTER — HOSPITAL ENCOUNTER (OUTPATIENT)
Dept: CARDIAC REHAB | Age: 53
Setting detail: THERAPIES SERIES
Discharge: HOME | End: 2023-10-11
Payer: MEDICAID

## 2023-10-13 ENCOUNTER — HOSPITAL ENCOUNTER (OUTPATIENT)
Dept: CARDIAC REHAB | Age: 53
Setting detail: THERAPIES SERIES
End: 2023-10-13
Payer: MEDICAID

## 2023-10-16 ENCOUNTER — HOSPITAL ENCOUNTER (OUTPATIENT)
Age: 53
Discharge: HOME | End: 2023-10-16
Payer: MEDICAID

## 2023-10-16 ENCOUNTER — HOSPITAL ENCOUNTER (OUTPATIENT)
Dept: CARDIAC REHAB | Age: 53
Setting detail: THERAPIES SERIES
Discharge: HOME | End: 2023-10-16
Payer: MEDICAID

## 2023-10-16 LAB
INR PPP: 2.18 (ref 0.84–1.16)
PROTHROMBIN TIME: 24.1 SEC (ref 11.5–14.8)

## 2023-10-16 PROCEDURE — 85610 PROTHROMBIN TIME: CPT

## 2023-10-16 PROCEDURE — 93798 PHYS/QHP OP CAR RHAB W/ECG: CPT

## 2023-10-16 PROCEDURE — 36415 COLL VENOUS BLD VENIPUNCTURE: CPT

## 2023-10-16 NOTE — PROGRESS NOTES
Spoke with Pura Garcias, VINCE at the [facility] LVAD clinic in regards to  on arrival, rechecked and . Also informed office that pt is in atrial fibrillation. Pt had cardioversion 10/12/23. Pt can exercise as long as not symptomatic. Ingrid Healy is notifying MD of rhythm change. Pt resting HR 80's.

## 2023-10-18 ENCOUNTER — HOSPITAL ENCOUNTER (OUTPATIENT)
Dept: CARDIAC REHAB | Age: 53
Setting detail: THERAPIES SERIES
Discharge: HOME | End: 2023-10-18
Payer: MEDICAID

## 2023-10-18 PROCEDURE — 93798 PHYS/QHP OP CAR RHAB W/ECG: CPT

## 2023-10-18 ASSESSMENT — LIFESTYLE VARIABLES: CIGARETTES_PER_DAY: 6

## 2023-10-18 NOTE — PROGRESS NOTES
Spoke with RN and physician at [facility] clinic in regards to  (resting/before exercising). Pt states that he smoked a cigarette before coming in. Pt states that he is having random floaters in his eyes. MD aware. MD spoke with patient via his cell phone. Pt is ok to exercise. Will continue to monitor.

## 2023-10-20 ENCOUNTER — HOSPITAL ENCOUNTER (OUTPATIENT)
Dept: CARDIAC REHAB | Age: 53
Setting detail: THERAPIES SERIES
Discharge: HOME | End: 2023-10-20
Payer: MEDICAID

## 2023-10-20 PROCEDURE — 93798 PHYS/QHP OP CAR RHAB W/ECG: CPT

## 2023-10-20 NOTE — PROGRESS NOTES
Pt MAP is 120 after several minutes of rest and prior to exercising. Pt denies lightheadedness, dizziness, shortness of breath, headache, visual floaters or any other symptoms. Spoke with Kenny Elam RN at 1 [facility], and notified of MAP. Kenny Elam states that pt can work out if he feels well enough to do so and should stop if he becomes short of breath or if he feels like he is not feeling well.

## 2023-10-23 ENCOUNTER — HOSPITAL ENCOUNTER (OUTPATIENT)
Dept: CARDIAC REHAB | Age: 53
Setting detail: THERAPIES SERIES
End: 2023-10-23
Payer: MEDICAID

## 2023-10-25 ENCOUNTER — HOSPITAL ENCOUNTER (OUTPATIENT)
Dept: CARDIAC REHAB | Age: 53
Setting detail: THERAPIES SERIES
Discharge: HOME | End: 2023-10-25
Payer: MEDICAID

## 2023-10-25 NOTE — CARDIO/PULMONARY
Pt called 10/24/23 and stated that they pulled a muscle and would not be attending cardiac rehab 10/25/2023. Would call if they were not feeling better for upcoming appt 10/27/2023. Pt was instructed to reach out to PCP, or go to ER if symptoms worsen.      Electronically signed by Nica Contreras on 10/25/2023 at 8:26 AM

## 2023-10-27 ENCOUNTER — HOSPITAL ENCOUNTER (OUTPATIENT)
Dept: CARDIAC REHAB | Age: 53
Setting detail: THERAPIES SERIES
Discharge: HOME | End: 2023-10-27
Payer: MEDICAID

## 2023-10-30 ENCOUNTER — HOSPITAL ENCOUNTER (OUTPATIENT)
Age: 53
Discharge: HOME | End: 2023-10-30
Payer: MEDICAID

## 2023-10-30 ENCOUNTER — HOSPITAL ENCOUNTER (OUTPATIENT)
Dept: CARDIAC REHAB | Age: 53
Setting detail: THERAPIES SERIES
Discharge: HOME | End: 2023-10-30
Payer: MEDICAID

## 2023-10-30 LAB
INR PPP: 2.62 (ref 0.84–1.16)
PROTHROMBIN TIME: 27.9 SEC (ref 11.5–14.8)

## 2023-10-30 PROCEDURE — 36415 COLL VENOUS BLD VENIPUNCTURE: CPT

## 2023-10-30 PROCEDURE — 85610 PROTHROMBIN TIME: CPT

## 2023-10-30 PROCEDURE — 93798 PHYS/QHP OP CAR RHAB W/ECG: CPT

## 2023-10-30 NOTE — PROGRESS NOTES
Resting . Pt is asymptomatic, states he feels \"good\" today. Pt states he smoked a cigarette about an \" hour ago. Call placed to LVAD clinic. Pt is ok to exercise as long as he remains asymptomatic.

## 2023-11-01 ENCOUNTER — HOSPITAL ENCOUNTER (OUTPATIENT)
Dept: CARDIAC REHAB | Age: 53
Setting detail: THERAPIES SERIES
Discharge: HOME | End: 2023-11-01
Payer: MEDICAID

## 2023-11-01 PROCEDURE — 93798 PHYS/QHP OP CAR RHAB W/ECG: CPT

## 2023-11-01 NOTE — PROGRESS NOTES
Resting . Pt is asymptomatic. Pt quit smoking on 10/31/23. Call placed to LVAD clinic, spoke with [facility]. Pt is ok to exercise as long as remains asymptomatic.

## 2023-11-03 ENCOUNTER — TELEPHONE (OUTPATIENT)
Dept: CARDIAC REHAB | Age: 53
End: 2023-11-03

## 2023-11-06 ENCOUNTER — HOSPITAL ENCOUNTER (OUTPATIENT)
Age: 53
Discharge: HOME | End: 2023-11-06
Payer: MEDICAID

## 2023-11-06 ENCOUNTER — HOSPITAL ENCOUNTER (OUTPATIENT)
Dept: CARDIAC REHAB | Age: 53
Setting detail: THERAPIES SERIES
Discharge: HOME | End: 2023-11-06
Payer: MEDICAID

## 2023-11-06 LAB
INR PPP: 2.06 (ref 0.84–1.16)
PROTHROMBIN TIME: 23.1 SEC (ref 11.5–14.8)

## 2023-11-06 PROCEDURE — 85610 PROTHROMBIN TIME: CPT

## 2023-11-06 PROCEDURE — 36415 COLL VENOUS BLD VENIPUNCTURE: CPT

## 2023-11-06 PROCEDURE — 93798 PHYS/QHP OP CAR RHAB W/ECG: CPT

## 2023-11-06 NOTE — PROGRESS NOTES
Patient here for cardiac rehab and resting SBP with doppler is 114/0. Patient reports feeling fine right now. Patient could hear his heart beating this morning and he cant usually here that. Called the LVAD clinic and spoke with Alejandra. Patient is okay to exercise if he feels up to it.

## 2023-11-08 ENCOUNTER — HOSPITAL ENCOUNTER (OUTPATIENT)
Dept: CARDIAC REHAB | Age: 53
Setting detail: THERAPIES SERIES
Discharge: HOME | End: 2023-11-08
Payer: MEDICAID

## 2023-11-08 PROCEDURE — 93798 PHYS/QHP OP CAR RHAB W/ECG: CPT

## 2023-11-10 ENCOUNTER — HOSPITAL ENCOUNTER (OUTPATIENT)
Dept: CARDIAC REHAB | Age: 53
Setting detail: THERAPIES SERIES
Discharge: HOME | End: 2023-11-10
Payer: MEDICAID

## 2023-11-10 PROCEDURE — 93798 PHYS/QHP OP CAR RHAB W/ECG: CPT

## 2023-11-10 NOTE — CARDIO/PULMONARY
Left VM that the patient is in cardiac rehab with  asymptomatic     Electronically signed by Caitlin Wood on 11/10/2023 at 1:41 PM

## 2023-11-10 NOTE — TELEPHONE ENCOUNTER
Pt calling stating he does not think the am dosage of Lasix is working. Per pt he is taking(Lasix 40 mg in the am and 40 mg in the pm-   States he is having to take an extra pill more often then he use to. Due to having more SOB @ times. WT today is 217. Also, per pt he is waiting for his ins to approve the [facility]. His pharmacy did give him a 30 day voucher. States he is going to start med today. He has not taken any Lisinopril for 2-3 days. Last ov 6.28.21  Assessment:      1. Coronary artery disease involving native coronary artery of native heart without angina pectoris:  Most [address] 12/13/12 Severe 1V CAD with 99% mid-RCA stenosis with thrombus and DARLING-2 distal flow D2 with diffuse ostial-proximal 30% plaquing; EF 40%;  Successful aspiration thrombectomy/stenting of the mid-RCA s/p OLU. There are no concerning symptoms for angina currently.        2. Acute systolic (congestive) heart failure ([address]): Clinically compensated NYHA Class II and will continue current CHF medical regimen. See #3 below      3. Ischemic cardiomyopathy: Most recent limited ECHO 6/22/21 EF=15-20%. No definitive thrombus Will  continue CHF regimen of lasix, coreg, and aldactone. Will switch from lisinopril to entresto to see if can make him feel better clinically.        4. Warfarin anticoagulation: Apical thrombus resolved and NO AC. See #3 above. Enrolled in [facility] [place]. [facility] coumadin clinic. Given short amount of PAF on ICD check 3/21 and hx LV thrombus prior with severe LV dysfunction I believe he merits AC with coumadin indefinitely.          5. Most recent lipids 11/11/20 I personally reviewed lab results in epic (see above) and discussed with patient. Well controlled except for lower HDL and will continue current medical regimen.     Plan:  1. Meds reviewed   2. In you notice weight gain of 3-4 lbs or increase SOB and edema. OK to take an extra lasix pill for 2-3 days.   3. Recommend you remain on coumadin for Hx of apical thrombus and possible AT/AF on device check   4. Will start Entresto 24-26mg 2 times daily if able to afford. If able to afford stop Lisinopril 2 days before starting Entresto. 5. Follow up with [facility] available to titrate entresto if able. Follow up with me in October 6. Recommend COVID vaccine   7.  Need FLP recheck by end of 2021.    Patient/Caregiver provided printed discharge information.

## 2023-11-13 ENCOUNTER — HOSPITAL ENCOUNTER (OUTPATIENT)
Age: 53
Discharge: HOME | End: 2023-11-13
Payer: MEDICAID

## 2023-11-13 ENCOUNTER — HOSPITAL ENCOUNTER (OUTPATIENT)
Dept: CARDIAC REHAB | Age: 53
Setting detail: THERAPIES SERIES
Discharge: HOME | End: 2023-11-13
Payer: MEDICAID

## 2023-11-13 LAB
INR PPP: 1.85 (ref 0.84–1.16)
PROTHROMBIN TIME: 21.2 SEC (ref 11.5–14.8)

## 2023-11-13 PROCEDURE — 36415 COLL VENOUS BLD VENIPUNCTURE: CPT

## 2023-11-13 PROCEDURE — 93798 PHYS/QHP OP CAR RHAB W/ECG: CPT

## 2023-11-13 PROCEDURE — 85610 PROTHROMBIN TIME: CPT

## 2023-11-13 NOTE — CARDIO/PULMONARY
Spoke to Jamal arriola, Care Coordinator, RN at the [facility] clinic to confirm the patient can exercise with a mapping of 112. Pt is asymptomatic. Gave the \"Safe to Exercise Ok. \"    Confirmed Clinic knew about patient dehydration episode this past weekend and EMS was called to home. [place] confirmed they knew and encouraged patient to continue to drink fluid.      Electronically signed by Jory Batres on 11/13/2023 at 1:59 PM

## 2023-11-15 ENCOUNTER — HOSPITAL ENCOUNTER (OUTPATIENT)
Dept: CARDIAC REHAB | Age: 53
Setting detail: THERAPIES SERIES
Discharge: HOME | End: 2023-11-15
Payer: MEDICAID

## 2023-11-15 PROCEDURE — 93798 PHYS/QHP OP CAR RHAB W/ECG: CPT

## 2023-11-21 ENCOUNTER — HOSPITAL ENCOUNTER (OUTPATIENT)
Age: 53
Discharge: HOME | End: 2023-11-21
Payer: MEDICAID

## 2023-11-21 LAB
INR PPP: 1.89 (ref 0.84–1.16)
PROTHROMBIN TIME: 21.6 SEC (ref 11.5–14.8)

## 2023-11-21 PROCEDURE — 85610 PROTHROMBIN TIME: CPT

## 2023-11-21 PROCEDURE — 36415 COLL VENOUS BLD VENIPUNCTURE: CPT

## 2023-11-22 ENCOUNTER — HOSPITAL ENCOUNTER (OUTPATIENT)
Dept: CARDIAC REHAB | Age: 53
Setting detail: THERAPIES SERIES
Discharge: HOME | End: 2023-11-22
Payer: MEDICAID

## 2023-11-22 PROCEDURE — 93798 PHYS/QHP OP CAR RHAB W/ECG: CPT

## 2023-11-24 ENCOUNTER — APPOINTMENT (OUTPATIENT)
Dept: CARDIAC REHAB | Age: 53
End: 2023-11-24
Payer: MEDICAID

## 2023-11-27 ENCOUNTER — HOSPITAL ENCOUNTER (OUTPATIENT)
Dept: CARDIAC REHAB | Age: 53
Setting detail: THERAPIES SERIES
Discharge: HOME | End: 2023-11-27
Payer: MEDICAID

## 2023-11-27 ENCOUNTER — HOSPITAL ENCOUNTER (OUTPATIENT)
Age: 53
Discharge: HOME | End: 2023-11-27
Payer: MEDICAID

## 2023-11-27 LAB
INR PPP: 1.69 (ref 0.84–1.16)
PROTHROMBIN TIME: 19.8 SEC (ref 11.5–14.8)

## 2023-11-27 PROCEDURE — 36415 COLL VENOUS BLD VENIPUNCTURE: CPT

## 2023-11-27 PROCEDURE — 85610 PROTHROMBIN TIME: CPT

## 2023-11-27 PROCEDURE — 93798 PHYS/QHP OP CAR RHAB W/ECG: CPT

## 2023-11-27 ASSESSMENT — LIFESTYLE VARIABLES: AMOUNT_OF_TOBACCO_USED: 0

## 2023-11-27 NOTE — PLAN OF CARE
Cardiopulmonary Rehab Treatment Plan   Name: Katie Albert Assessment Date: 2023   : 1970 Primary Diagnosis: Treatment Diagnosis 1: HF      Age: 46 y.o. Referring Physician:  Pedro Coy   MRN: [id number] Primary Care Physician: ERVIN Willard CNP   Exercise Prescription  Mode: Track; Treadmill; Bike; Stepper; Ergometer; Elliptical; Rower  Frequency per week: 2-3 supervised sessions weekly  Duration Per Session: 30-45+ minutes of steady aerobic exercise  Intensity METS      : 3-6 (Increase workloads 0.5-1.0 METS/weekly)  RPE: 12-14  Progression: max MET: 4.8  Target Heart Rate: RHR +20-30 unless on a beta blocker  Resistance Training: Yes    Exercise Blood Pressures  Resting BP: 98/0  Peak BP: doppler sbp 110  Is BP WDL: No    Exercise Activity at Home  Type: walking  Resistance Training: No    Exercise Education  Education: Signs/symptoms to report; RPE scale; Physically active; Warm up/cool down; Equipment orientation; Self pulse    Psychosocial  Stages of Change: Action  Kiel Total Score: 37  PHQ-9 Total Score: 13    Psychosocial Intervention  Interventions: Currently under treatment for depression; PCP notified  Consults: -- (pt states he has an appt. with a psychiatrist on 23)  Currently Taking Psychotropic Meds: Yes (zoloft and trazadone)  Medication Changes: Yes (new zoloft and trazadone for anxiety and depression.)    Psychosocial Education  Education: Cardiac meds; Impact self care behaviors on health; Sexual activity; Relaxation techniques; Tobacco dangers    Psychosocial Target Goals  Target Goal(s): Engages in self-care behaviors; Assess presence or absence of depression using a valid screening tool  Uses Stress Mgmt Techniques: No  Patient Stated Psychosocial Goals: Patient has increased his activity level as much as possible. (Patient reports he is depressed and wants to feel better. Patient denies need to talk to someone.  Patient reports he is

## 2023-11-29 ENCOUNTER — HOSPITAL ENCOUNTER (OUTPATIENT)
Dept: CARDIAC REHAB | Age: 53
Setting detail: THERAPIES SERIES
Discharge: HOME | End: 2023-11-29
Payer: MEDICAID

## 2023-11-29 PROCEDURE — 93798 PHYS/QHP OP CAR RHAB W/ECG: CPT

## 2023-12-04 ENCOUNTER — APPOINTMENT (OUTPATIENT)
Dept: CARDIAC REHAB | Age: 53
End: 2023-12-04
Payer: MEDICAID

## 2023-12-06 ENCOUNTER — TELEPHONE (OUTPATIENT)
Dept: CARDIAC REHAB | Age: 53
End: 2023-12-06

## 2023-12-06 NOTE — TELEPHONE ENCOUNTER
Patient has missed cardiac rehab appointments this week because he has been dizzy when getting up or bending over. Patient had a couple of dizzy episodes when driving and did not feel safe. Patient reports calling MD and was told to drink more water and Gatorade. If feeling better, patient plans to return on 12/8/23.

## 2023-12-07 ENCOUNTER — HOSPITAL ENCOUNTER (OUTPATIENT)
Age: 53
Discharge: HOME | End: 2023-12-07
Payer: MEDICAID

## 2023-12-07 ENCOUNTER — ANTI-COAG VISIT (OUTPATIENT)
Dept: PHARMACY | Age: 53
End: 2023-12-07

## 2023-12-07 DIAGNOSIS — Z95.811 LEFT VENTRICULAR ASSIST DEVICE PRESENT (HCC): Primary | ICD-10-CM

## 2023-12-07 LAB
INR PPP: 1.81 (ref 0.84–1.16)
PROTHROMBIN TIME: 20.9 SEC (ref 11.5–14.8)

## 2023-12-07 PROCEDURE — 85610 PROTHROMBIN TIME: CPT

## 2023-12-07 PROCEDURE — 36415 COLL VENOUS BLD VENIPUNCTURE: CPT

## 2023-12-07 NOTE — PROGRESS NOTES
Being managed by LVAD clinic, will sign off coumadin clinic episode.   Guillermo Saunders, PharmD 3:30 PM EST 12/7/23

## 2023-12-13 ENCOUNTER — HOSPITAL ENCOUNTER (OUTPATIENT)
Dept: CARDIAC REHAB | Age: 53
Setting detail: THERAPIES SERIES
Discharge: HOME | End: 2023-12-13
Payer: MEDICAID

## 2023-12-13 PROCEDURE — 93798 PHYS/QHP OP CAR RHAB W/ECG: CPT

## 2023-12-15 ENCOUNTER — HOSPITAL ENCOUNTER (OUTPATIENT)
Dept: CARDIAC REHAB | Age: 53
Setting detail: THERAPIES SERIES
Discharge: HOME | End: 2023-12-15
Payer: MEDICAID

## 2023-12-15 PROCEDURE — 93798 PHYS/QHP OP CAR RHAB W/ECG: CPT

## 2023-12-27 ENCOUNTER — HOSPITAL ENCOUNTER (OUTPATIENT)
Dept: CARDIAC REHAB | Age: 53
Setting detail: THERAPIES SERIES
Discharge: HOME | End: 2023-12-27
Payer: MEDICAID

## 2023-12-27 ENCOUNTER — HOSPITAL ENCOUNTER (OUTPATIENT)
Age: 53
Discharge: HOME | End: 2023-12-27
Payer: MEDICAID

## 2023-12-27 LAB
INR PPP: 1.65 (ref 0.84–1.16)
PROTHROMBIN TIME: 19.5 SEC (ref 11.5–14.8)

## 2023-12-27 PROCEDURE — 93798 PHYS/QHP OP CAR RHAB W/ECG: CPT

## 2023-12-27 PROCEDURE — 85610 PROTHROMBIN TIME: CPT

## 2023-12-27 NOTE — PROGRESS NOTES
12/27/23 1330   Vital Signs   MAP (mmHg) 108  (patient reports he did not drink very much yesterday and thought his MAP would be high. patient is drinking gatorade. patient denies having any symptoms. called LVAD clinic and left voicemail.)   BP Location Left upper arm   BP Method Doppler   Patient Position Sitting   Spoke with Charles Mims at [address] clinic. Patient may exercise as long as he is not dizzy or light headed. If patient becomes dizzy or light headed he is to stop exercise and call the LVAD clinic. Patient is to make sure he is drinking 64 oz per day. This was relayed to patient and patient verbalized he would stop exercise if he has symptoms and tell staff and he will finish the Gatorade he has here with him. Patient reports he is suppose to drink more than 64 oz.

## 2023-12-29 ENCOUNTER — HOSPITAL ENCOUNTER (OUTPATIENT)
Dept: CARDIAC REHAB | Age: 53
Setting detail: THERAPIES SERIES
Discharge: HOME | End: 2023-12-29
Payer: MEDICAID

## 2023-12-29 PROCEDURE — 93798 PHYS/QHP OP CAR RHAB W/ECG: CPT

## 2024-01-04 ENCOUNTER — HOSPITAL ENCOUNTER (OUTPATIENT)
Age: 54
Discharge: HOME | End: 2024-01-04
Payer: MEDICAID

## 2024-01-04 LAB
INR PPP: 1.71 (ref 0.84–1.16)
PROTHROMBIN TIME: 20 SEC (ref 11.5–14.8)

## 2024-01-04 PROCEDURE — 36415 COLL VENOUS BLD VENIPUNCTURE: CPT

## 2024-01-04 PROCEDURE — 85610 PROTHROMBIN TIME: CPT

## 2024-01-05 ENCOUNTER — HOSPITAL ENCOUNTER (OUTPATIENT)
Dept: CARDIAC REHAB | Age: 54
Setting detail: THERAPIES SERIES
Discharge: HOME | End: 2024-01-05

## 2024-01-05 ENCOUNTER — TELEPHONE (OUTPATIENT)
Dept: CARDIAC REHAB | Age: 54
End: 2024-01-05

## 2024-01-12 ENCOUNTER — HOSPITAL ENCOUNTER (OUTPATIENT)
Dept: CARDIAC REHAB | Age: 54
Setting detail: THERAPIES SERIES
Discharge: HOME | End: 2024-01-12

## 2024-01-12 NOTE — PROGRESS NOTES
Patient will not be at cardiac rehab today. Patient is recovering from sinus infection and cold. Patient went to LVAD clinic this morning due to \"drive line problem\". Site is red and painful. Patient reports he is going to rest today and plans to return on 1/15/24. Reviewed signs and symptoms of infection and when to go to ED and importance of monitoring drive line for infection. Patient verbalized importance and will closely monitor.

## 2024-01-16 ENCOUNTER — HOSPITAL ENCOUNTER (OUTPATIENT)
Age: 54
Discharge: HOME | End: 2024-01-16
Payer: MEDICAID

## 2024-01-16 LAB
INR PPP: 1.87 (ref 0.84–1.16)
PROTHROMBIN TIME: 21.4 SEC (ref 11.5–14.8)

## 2024-01-16 PROCEDURE — 85610 PROTHROMBIN TIME: CPT

## 2024-01-16 PROCEDURE — 36415 COLL VENOUS BLD VENIPUNCTURE: CPT

## 2024-01-27 ENCOUNTER — HOSPITAL ENCOUNTER (EMERGENCY)
Age: 54
Discharge: LEFT BEFORE DISCHARGE | End: 2024-01-27
Attending: EMERGENCY MEDICINE
Payer: MEDICAID

## 2024-01-27 ENCOUNTER — APPOINTMENT (OUTPATIENT)
Dept: CT IMAGING | Age: 54
End: 2024-01-27
Payer: MEDICAID

## 2024-01-27 VITALS
WEIGHT: 220 LBS | BODY MASS INDEX: 28.23 KG/M2 | OXYGEN SATURATION: 94 % | HEART RATE: 98 BPM | RESPIRATION RATE: 18 BRPM | HEIGHT: 74 IN | TEMPERATURE: 98.2 F

## 2024-01-27 DIAGNOSIS — G45.9 TIA (TRANSIENT ISCHEMIC ATTACK): Primary | ICD-10-CM

## 2024-01-27 LAB
ALBUMIN SERPL-MCNC: 4.3 G/DL (ref 3.4–5)
ALBUMIN/GLOB SERPL: 1.2 {RATIO} (ref 1.1–2.2)
ALP SERPL-CCNC: 98 U/L (ref 40–129)
ALT SERPL-CCNC: 16 U/L (ref 10–40)
ANION GAP SERPL CALCULATED.3IONS-SCNC: 16 MMOL/L (ref 3–16)
APTT BLD: 31.4 SEC (ref 22.7–35.9)
AST SERPL-CCNC: 22 U/L (ref 15–37)
BASOPHILS # BLD: 0.1 K/UL (ref 0–0.2)
BASOPHILS NFR BLD: 0.7 %
BILIRUB SERPL-MCNC: 0.4 MG/DL (ref 0–1)
BUN SERPL-MCNC: 13 MG/DL (ref 7–20)
CALCIUM SERPL-MCNC: 10.1 MG/DL (ref 8.3–10.6)
CHLORIDE SERPL-SCNC: 101 MMOL/L (ref 99–110)
CO2 SERPL-SCNC: 21 MMOL/L (ref 21–32)
CREAT SERPL-MCNC: 0.7 MG/DL (ref 0.9–1.3)
DEPRECATED RDW RBC AUTO: 16.2 % (ref 12.4–15.4)
EKG ATRIAL RATE: 66 BPM
EKG DIAGNOSIS: NORMAL
EKG P AXIS: -8 DEGREES
EKG P-R INTERVAL: 192 MS
EKG Q-T INTERVAL: 454 MS
EKG QRS DURATION: 156 MS
EKG QTC CALCULATION (BAZETT): 475 MS
EKG R AXIS: 241 DEGREES
EKG T AXIS: 67 DEGREES
EKG VENTRICULAR RATE: 66 BPM
EOSINOPHIL # BLD: 0.2 K/UL (ref 0–0.6)
EOSINOPHIL NFR BLD: 1.3 %
GFR SERPLBLD CREATININE-BSD FMLA CKD-EPI: >60 ML/MIN/{1.73_M2}
GLUCOSE BLD-MCNC: 114 MG/DL (ref 70–99)
GLUCOSE SERPL-MCNC: 123 MG/DL (ref 70–99)
HCT VFR BLD AUTO: 48.8 % (ref 40.5–52.5)
HGB BLD-MCNC: 15.9 G/DL (ref 13.5–17.5)
INR PPP: 1.98 (ref 0.84–1.16)
LYMPHOCYTES # BLD: 2.9 K/UL (ref 1–5.1)
LYMPHOCYTES NFR BLD: 24.1 %
MCH RBC QN AUTO: 29 PG (ref 26–34)
MCHC RBC AUTO-ENTMCNC: 32.6 G/DL (ref 31–36)
MCV RBC AUTO: 88.9 FL (ref 80–100)
MONOCYTES # BLD: 0.9 K/UL (ref 0–1.3)
MONOCYTES NFR BLD: 7.4 %
NEUTROPHILS # BLD: 8 K/UL (ref 1.7–7.7)
NEUTROPHILS NFR BLD: 66.5 %
PERFORMED ON: ABNORMAL
PLATELET # BLD AUTO: 188 K/UL (ref 135–450)
PMV BLD AUTO: 9 FL (ref 5–10.5)
POTASSIUM SERPL-SCNC: 3.8 MMOL/L (ref 3.5–5.1)
PROT SERPL-MCNC: 7.8 G/DL (ref 6.4–8.2)
PROTHROMBIN TIME: 22.4 SEC (ref 11.5–14.8)
RBC # BLD AUTO: 5.48 M/UL (ref 4.2–5.9)
SODIUM SERPL-SCNC: 138 MMOL/L (ref 136–145)
TROPONIN, HIGH SENSITIVITY: 8 NG/L (ref 0–22)
WBC # BLD AUTO: 12 K/UL (ref 4–11)

## 2024-01-27 PROCEDURE — 93010 ELECTROCARDIOGRAM REPORT: CPT | Performed by: INTERNAL MEDICINE

## 2024-01-27 PROCEDURE — 70450 CT HEAD/BRAIN W/O DYE: CPT

## 2024-01-27 PROCEDURE — 85025 COMPLETE CBC W/AUTO DIFF WBC: CPT

## 2024-01-27 PROCEDURE — 80053 COMPREHEN METABOLIC PANEL: CPT

## 2024-01-27 PROCEDURE — 36415 COLL VENOUS BLD VENIPUNCTURE: CPT

## 2024-01-27 PROCEDURE — 84484 ASSAY OF TROPONIN QUANT: CPT

## 2024-01-27 PROCEDURE — 93005 ELECTROCARDIOGRAM TRACING: CPT | Performed by: EMERGENCY MEDICINE

## 2024-01-27 PROCEDURE — 85730 THROMBOPLASTIN TIME PARTIAL: CPT

## 2024-01-27 PROCEDURE — 83615 LACTATE (LD) (LDH) ENZYME: CPT

## 2024-01-27 PROCEDURE — 99284 EMERGENCY DEPT VISIT MOD MDM: CPT

## 2024-01-27 PROCEDURE — 85610 PROTHROMBIN TIME: CPT

## 2024-01-27 RX ORDER — SACUBITRIL AND VALSARTAN 24; 26 MG/1; MG/1
1 TABLET, FILM COATED ORAL 2 TIMES DAILY
COMMUNITY

## 2024-01-27 RX ORDER — GABAPENTIN 300 MG/1
300 CAPSULE ORAL 3 TIMES DAILY
COMMUNITY

## 2024-01-27 ASSESSMENT — PAIN - FUNCTIONAL ASSESSMENT: PAIN_FUNCTIONAL_ASSESSMENT: NONE - DENIES PAIN

## 2024-01-27 NOTE — ED NOTES
Discharge instructions reviewed with patient, this RN educated patient on returning to the ED for continuation of care, educating the patient on risk of leaving AMA at this time. Patient still requests to leave AMA. AMA paper signed by patient with witness, IV removed and dressing applied, and all questions were addressed at this time. Patient is alert and oriented x4, and walked to the lobby with a stable gait.

## 2024-01-27 NOTE — ED NOTES
Pt states \"I don't really want to be admitted. I'm going down to [place] on Monday anyway and I feel fine now.\" Dr Ayon and pts son in to bedside. Verbal report given to JUAN Clarke RN

## 2024-01-27 NOTE — ED PROVIDER NOTES
[facility] EMERGENCY DEPARTMENT  EMERGENCY DEPARTMENT ENCOUNTER      Pt Name: Austen Mayer  MRN: [id number]  Birthdate 1970  Date of evaluation: 1/27/2024  Provider: Tyrone Ayon MD    CHIEF COMPLAINT       Chief Complaint   Patient presents with    Dizziness     States sudden onset of dizziness with severe L sided weakness while driving that began approx 1500 today. States the L sided weakness has since resolved but the dizziness is still remaining, \"but better.\"         HISTORY OF PRESENT ILLNESS   (Location/Symptom, Timing/Onset, Context/Setting, Quality, Duration, Modifying Factors, Severity)  Note limiting factors.   Austen Mayer is a 53 y.o. male who presents to the emergency department with the chief complaint of   Chief Complaint   Patient presents with    Dizziness     States sudden onset of dizziness with severe L sided weakness while driving that began approx 1500 today. States the L sided weakness has since resolved but the dizziness is still remaining, \"but better.\"   . 53-year-old male with past medical history significant for tobacco use, CAD, CHF status post LVAD placement, high cholesterol, hypertension, neck pain and anxiety presents to the ER via EMS for evaluation of left-sided weakness.  Patient states approximately 3 PM while driving he developed dizziness sensation.  Patient states he felt as if the world was spinning while he was driving his car.  Patient stated to pull over and wait his son down who is falling behind him.  Patient states he had weakness on his left side of his body and felt very dizzy.  Patient appeared to be ataxic and dragging the left side while walking.  Patient states symptoms lasted for several minutes.  Patient arrives here anxious, diaphoretic complaining of intermittent pain in his neck and tingling in his left arm.  Patient felt nauseated and was provided Zofran by EMS en route.  Patient arrives here with NIHSS of 0.  Patient states he had  Problem: Nutritional:  Goal: Achieve adequate nutritional intake  Patient will consume 50% of meals  Outcome: NOT MET         signed)  Attending Emergency Physician            Tyrone Ayon MD  01/28/24 [id number]

## 2024-01-27 NOTE — DISCHARGE INSTRUCTIONS
As discussed today would like to admit you for further evaluation and treatment.  By leaving prior to complete workup you run the risk of having a stroke, significant disability and/or death.  If you change your mind anytime please come back to the ER for repeat evaluation and admission.

## 2024-01-28 LAB — LDH SERPL L TO P-CCNC: 206 U/L (ref 100–190)

## 2024-01-29 ENCOUNTER — HOSPITAL ENCOUNTER (OUTPATIENT)
Dept: CARDIAC REHAB | Age: 54
Setting detail: THERAPIES SERIES
Discharge: HOME | End: 2024-01-29

## 2024-02-29 ENCOUNTER — HOSPITAL ENCOUNTER (EMERGENCY)
Age: 54
Discharge: HOME | End: 2024-02-29
Attending: EMERGENCY MEDICINE
Payer: MEDICAID

## 2024-02-29 ENCOUNTER — APPOINTMENT (OUTPATIENT)
Dept: CT IMAGING | Age: 54
End: 2024-02-29
Attending: EMERGENCY MEDICINE
Payer: MEDICAID

## 2024-02-29 VITALS
RESPIRATION RATE: 16 BRPM | TEMPERATURE: 98.3 F | WEIGHT: 220 LBS | OXYGEN SATURATION: 98 % | BODY MASS INDEX: 28.23 KG/M2 | HEART RATE: 79 BPM | HEIGHT: 74 IN

## 2024-02-29 DIAGNOSIS — Z79.01 ANTICOAGULATED ON COUMADIN: ICD-10-CM

## 2024-02-29 DIAGNOSIS — S09.90XA INJURY OF HEAD, INITIAL ENCOUNTER: Primary | ICD-10-CM

## 2024-02-29 DIAGNOSIS — S09.90XA CLOSED HEAD INJURY, INITIAL ENCOUNTER: ICD-10-CM

## 2024-02-29 LAB
INR PPP: 2.16 (ref 0.84–1.16)
PROTHROMBIN TIME: 24 SEC (ref 11.5–14.8)

## 2024-02-29 PROCEDURE — 36415 COLL VENOUS BLD VENIPUNCTURE: CPT

## 2024-02-29 PROCEDURE — 99284 EMERGENCY DEPT VISIT MOD MDM: CPT

## 2024-02-29 PROCEDURE — 70450 CT HEAD/BRAIN W/O DYE: CPT

## 2024-02-29 PROCEDURE — 85610 PROTHROMBIN TIME: CPT

## 2024-02-29 ASSESSMENT — PAIN SCALES - GENERAL: PAINLEVEL_OUTOF10: 4

## 2024-02-29 ASSESSMENT — LIFESTYLE VARIABLES
HOW OFTEN DO YOU HAVE A DRINK CONTAINING ALCOHOL: NEVER
HOW MANY STANDARD DRINKS CONTAINING ALCOHOL DO YOU HAVE ON A TYPICAL DAY: PATIENT DOES NOT DRINK

## 2024-02-29 ASSESSMENT — PAIN DESCRIPTION - LOCATION: LOCATION: HEAD

## 2024-02-29 ASSESSMENT — PAIN - FUNCTIONAL ASSESSMENT: PAIN_FUNCTIONAL_ASSESSMENT: 0-10

## 2024-02-29 NOTE — ED NOTES
Discharge instructions and medications reviewed with patient. Patient verbalized understanding of medications and follow up. All questions answered at this time. Skin warm, pink, and dry. Patient alert and oriented x4. Pt ambulated to lobby with a stable gait. Patient discharged home with 0 prescriptions.

## 2024-02-29 NOTE — ED PROVIDER NOTES
[facility] EMERGENCY DEPARTMENT  EMERGENCY DEPARTMENT ENCOUNTER      Pt Name: Austen Mayer  MRN: [id number]  Birthdate 1970  Date of evaluation: 2/29/2024  Provider: NITIN GOODWIN MD    CHIEF COMPLAINT       Chief Complaint   Patient presents with    Fall     Pt states that he was under something and states that he lifted his head and hit the back of his head. Pt states that he feels fine other than a headache.         HISTORY OF PRESENT ILLNESS   (Location/Symptom, Timing/Onset, Context/Setting, Quality, Duration, Modifying Factors, Severity)  Note limiting factors.     Austen Mayer is a 53 y.o. male who presents to the emergency department     Okay this patient presents emergency department history of just bumping his head.  He is anticoagulated on Coumadin his last INR was 2.2 he does have an LVAD.          Nursing Notes were reviewed.    REVIEW OF SYSTEMS    (2-9 systems for level 4, 10 or more for level 5)     Review of Systems   Constitutional:  Positive for activity change. Negative for fever.   Neurological:  Positive for headaches.   All other systems reviewed and are negative.      Except as noted above the remainder of the review of systems was reviewed and negative.       PAST MEDICAL HISTORY     Past Medical History:   Diagnosis Date    Anxiety     CAD (coronary artery disease)     Chronic systolic (congestive) heart failure (HCC)     Depression     GERD (gastroesophageal reflux disease)     Heart attack (HCC)     Hyperlipidemia     Hypertension     Insomnia     Neck pain          SURGICAL HISTORY       Past Surgical History:   Procedure Laterality Date    CARDIAC SURGERY      2012 at [facility] stent placed     CERVICAL FUSION      2009 at [facility]     LEFT VENTRICULAR ASSIST DEVICE           CURRENT MEDICATIONS       Previous Medications    ASPIRIN 81 MG CHEWABLE TABLET    Take 1 tablet by mouth daily    ATORVASTATIN (LIPITOR) 40 MG TABLET        DAPAGLIFLOZIN (FARXIGA) 10 MG TABLET  Spontaneous  Best Verbal Response: Oriented  Best Motor Response: Obeys commands  [place] Coma Scale Score: 15          PHYSICAL EXAM    (up to 7 for level 4, 8 or more for level 5)     ED Triage Vitals [02/29/24 1656]   BP Temp Temp Source Pulse Respirations SpO2 Height Weight - Scale   -- 98.3 °F (36.8 °C) Oral 79 16 98 % 1.88 m (6' 2\") 99.8 kg (220 lb)       Physical Exam  Vitals and nursing note reviewed.   Constitutional:       General: He is not in acute distress.     Appearance: Normal appearance. He is not ill-appearing.   HENT:      Head:      Comments: Very small abrasion contusion to the top of the head     Right Ear: Ear canal and external ear normal.      Left Ear: Ear canal and external ear normal.      Nose: Nose normal.      Mouth/Throat:      Mouth: Mucous membranes are moist.   Eyes:      Extraocular Movements: Extraocular movements intact.      Pupils: Pupils are equal, round, and reactive to light.   Cardiovascular:      Comments: Patient has an LVAD  Pulmonary:      Effort: Pulmonary effort is normal.      Breath sounds: Normal breath sounds.   Skin:     General: Skin is warm.      Findings: Bruising present.   Neurological:      General: No focal deficit present.      Mental Status: He is alert.         DIAGNOSTIC RESULTS     EKG: All EKG's are interpreted by the Emergency Department Physician who either signs or Co-signs this chart in the absence of a cardiologist.        RADIOLOGY:   Non-plain film images such as CT, Ultrasound and MRI are read by the radiologist. Plain radiographic images are visualized and preliminarily interpreted by the emergency physician with the below findings:        Interpretation per the Radiologist below, if available at the time of this note:    CT HEAD WO CONTRAST   Final Result   No acute intracranial abnormality.                 LABS:  Results for orders placed or performed during the hospital encounter of 02/29/24   Protime-INR   Result Value Ref Range

## 2024-03-01 ENCOUNTER — CARE COORDINATION (OUTPATIENT)
Dept: CARE COORDINATION | Age: 54
End: 2024-03-01

## 2024-03-01 NOTE — CARE COORDINATION
Attempted to contact patient for ED f/u call; left HIPPA compliant message and ACM contact information

## 2024-03-04 DIAGNOSIS — I50.22 CHRONIC SYSTOLIC (CONGESTIVE) HEART FAILURE (HCC): ICD-10-CM

## 2024-03-04 RX ORDER — DAPAGLIFLOZIN 10 MG/1
TABLET, FILM COATED ORAL
Qty: 90 TABLET | Refills: 0 | OUTPATIENT
Start: 2024-03-04

## 2024-03-04 NOTE — TELEPHONE ENCOUNTER
Last OV 4/10/23  No upcoming OV  CMP 1/27/24  CBC 1/27/24      Plan:  1. Tobacco Cessation ~ high risk factor to heart attack or stroke  ~1-800-QUIT-NOW  2. Recommend evaluation for consideration left ventricular assistant device (LVAD) or heart transplant at [facility]   ~ please contact office if you have any issues with referral and to discuss evaluation  3. I recommend that the patient continue their currently prescribed medications. Their drug modifiable risk factors appear to be well controlled. I will continue to address the need/dosing of medications in future visits.   4. Follow up in 6 months.    Attempted to call pt, no answer. LMOVM for pt to return call back to office. Will try again at a later time.

## 2024-03-21 ENCOUNTER — HOSPITAL ENCOUNTER (OUTPATIENT)
Age: 54
Setting detail: SPECIMEN
Discharge: HOME | End: 2024-03-21
Payer: MEDICAID

## 2024-03-21 LAB
ALBUMIN SERPL-MCNC: 4.2 G/DL (ref 3.4–5)
ALBUMIN/GLOB SERPL: 1.6 {RATIO} (ref 1.1–2.2)
ALP SERPL-CCNC: 105 U/L (ref 40–129)
ALT SERPL-CCNC: 14 U/L (ref 10–40)
ANION GAP SERPL CALCULATED.3IONS-SCNC: 13 MMOL/L (ref 3–16)
AST SERPL-CCNC: 16 U/L (ref 15–37)
BASOPHILS # BLD: 0 K/UL (ref 0–0.2)
BASOPHILS NFR BLD: 0.4 %
BILIRUB SERPL-MCNC: 0.3 MG/DL (ref 0–1)
BUN SERPL-MCNC: 11 MG/DL (ref 7–20)
CALCIUM SERPL-MCNC: 9.2 MG/DL (ref 8.3–10.6)
CHLORIDE SERPL-SCNC: 99 MMOL/L (ref 99–110)
CO2 SERPL-SCNC: 27 MMOL/L (ref 21–32)
CREAT SERPL-MCNC: 0.7 MG/DL (ref 0.9–1.3)
DEPRECATED RDW RBC AUTO: 13 % (ref 12.4–15.4)
EOSINOPHIL # BLD: 0.2 K/UL (ref 0–0.6)
EOSINOPHIL NFR BLD: 2.8 %
GFR SERPLBLD CREATININE-BSD FMLA CKD-EPI: >60 ML/MIN/{1.73_M2}
GLUCOSE SERPL-MCNC: 122 MG/DL (ref 70–99)
HCT VFR BLD AUTO: 43.3 % (ref 40.5–52.5)
HGB BLD-MCNC: 14.3 G/DL (ref 13.5–17.5)
LYMPHOCYTES # BLD: 1.3 K/UL (ref 1–5.1)
LYMPHOCYTES NFR BLD: 16.7 %
MCH RBC QN AUTO: 30 PG (ref 26–34)
MCHC RBC AUTO-ENTMCNC: 33.1 G/DL (ref 31–36)
MCV RBC AUTO: 90.6 FL (ref 80–100)
MONOCYTES # BLD: 0.5 K/UL (ref 0–1.3)
MONOCYTES NFR BLD: 6.4 %
NEUTROPHILS # BLD: 5.6 K/UL (ref 1.7–7.7)
NEUTROPHILS NFR BLD: 73.7 %
PLATELET # BLD AUTO: 173 K/UL (ref 135–450)
PMV BLD AUTO: 9.3 FL (ref 5–10.5)
POTASSIUM SERPL-SCNC: 4 MMOL/L (ref 3.5–5.1)
PROT SERPL-MCNC: 6.8 G/DL (ref 6.4–8.2)
RBC # BLD AUTO: 4.79 M/UL (ref 4.2–5.9)
SODIUM SERPL-SCNC: 139 MMOL/L (ref 136–145)
WBC # BLD AUTO: 7.6 K/UL (ref 4–11)

## 2024-03-21 PROCEDURE — 85025 COMPLETE CBC W/AUTO DIFF WBC: CPT

## 2024-03-21 PROCEDURE — 80053 COMPREHEN METABOLIC PANEL: CPT

## 2024-03-25 ENCOUNTER — HOSPITAL ENCOUNTER (OUTPATIENT)
Age: 54
Discharge: HOME | End: 2024-03-25
Payer: MEDICAID

## 2024-03-25 LAB
ALBUMIN SERPL-MCNC: 4 G/DL (ref 3.4–5)
ALBUMIN/GLOB SERPL: 1.3 {RATIO} (ref 1.1–2.2)
ALP SERPL-CCNC: 98 U/L (ref 40–129)
ALT SERPL-CCNC: 15 U/L (ref 10–40)
ANION GAP SERPL CALCULATED.3IONS-SCNC: 12 MMOL/L (ref 3–16)
AST SERPL-CCNC: 16 U/L (ref 15–37)
BASOPHILS # BLD: 0 K/UL (ref 0–0.2)
BASOPHILS NFR BLD: 0.4 %
BILIRUB SERPL-MCNC: <0.2 MG/DL (ref 0–1)
BUN SERPL-MCNC: 9 MG/DL (ref 7–20)
CALCIUM SERPL-MCNC: 9 MG/DL (ref 8.3–10.6)
CHLORIDE SERPL-SCNC: 101 MMOL/L (ref 99–110)
CO2 SERPL-SCNC: 26 MMOL/L (ref 21–32)
CREAT SERPL-MCNC: <0.5 MG/DL (ref 0.9–1.3)
DEPRECATED RDW RBC AUTO: 12.9 % (ref 12.4–15.4)
EOSINOPHIL # BLD: 0.2 K/UL (ref 0–0.6)
EOSINOPHIL NFR BLD: 2 %
GFR SERPLBLD CREATININE-BSD FMLA CKD-EPI: >90 ML/MIN/{1.73_M2}
GLUCOSE SERPL-MCNC: 88 MG/DL (ref 70–99)
HCT VFR BLD AUTO: 43.2 % (ref 40.5–52.5)
HGB BLD-MCNC: 14.6 G/DL (ref 13.5–17.5)
LYMPHOCYTES # BLD: 1.8 K/UL (ref 1–5.1)
LYMPHOCYTES NFR BLD: 23 %
MCH RBC QN AUTO: 29.9 PG (ref 26–34)
MCHC RBC AUTO-ENTMCNC: 33.7 G/DL (ref 31–36)
MCV RBC AUTO: 88.7 FL (ref 80–100)
MONOCYTES # BLD: 0.6 K/UL (ref 0–1.3)
MONOCYTES NFR BLD: 7.3 %
NEUTROPHILS # BLD: 5.1 K/UL (ref 1.7–7.7)
NEUTROPHILS NFR BLD: 67.3 %
PLATELET # BLD AUTO: 178 K/UL (ref 135–450)
PMV BLD AUTO: 9.3 FL (ref 5–10.5)
POTASSIUM SERPL-SCNC: 4.4 MMOL/L (ref 3.5–5.1)
PROT SERPL-MCNC: 7.1 G/DL (ref 6.4–8.2)
RBC # BLD AUTO: 4.87 M/UL (ref 4.2–5.9)
SODIUM SERPL-SCNC: 139 MMOL/L (ref 136–145)
WBC # BLD AUTO: 7.6 K/UL (ref 4–11)

## 2024-03-25 PROCEDURE — 80053 COMPREHEN METABOLIC PANEL: CPT

## 2024-03-25 PROCEDURE — 36415 COLL VENOUS BLD VENIPUNCTURE: CPT

## 2024-03-25 PROCEDURE — 85025 COMPLETE CBC W/AUTO DIFF WBC: CPT

## 2024-04-01 ENCOUNTER — HOSPITAL ENCOUNTER (OUTPATIENT)
Age: 54
Setting detail: SPECIMEN
Discharge: HOME | End: 2024-04-01
Payer: MEDICAID

## 2024-04-01 LAB
ALBUMIN SERPL-MCNC: 4.1 G/DL (ref 3.4–5)
ALBUMIN/GLOB SERPL: 1.2 {RATIO} (ref 1.1–2.2)
ALP SERPL-CCNC: 89 U/L (ref 40–129)
ALT SERPL-CCNC: 10 U/L (ref 10–40)
ANION GAP SERPL CALCULATED.3IONS-SCNC: 11 MMOL/L (ref 3–16)
AST SERPL-CCNC: 18 U/L (ref 15–37)
BASOPHILS # BLD: 0 K/UL (ref 0–0.2)
BASOPHILS NFR BLD: 0.5 %
BILIRUB SERPL-MCNC: <0.2 MG/DL (ref 0–1)
BUN SERPL-MCNC: 15 MG/DL (ref 7–20)
CALCIUM SERPL-MCNC: 8.8 MG/DL (ref 8.3–10.6)
CHLORIDE SERPL-SCNC: 102 MMOL/L (ref 99–110)
CO2 SERPL-SCNC: 26 MMOL/L (ref 21–32)
CREAT SERPL-MCNC: 0.6 MG/DL (ref 0.9–1.3)
DEPRECATED RDW RBC AUTO: 13.1 % (ref 12.4–15.4)
EOSINOPHIL # BLD: 0.1 K/UL (ref 0–0.6)
EOSINOPHIL NFR BLD: 2.3 %
GFR SERPLBLD CREATININE-BSD FMLA CKD-EPI: >90 ML/MIN/{1.73_M2}
GLUCOSE SERPL-MCNC: 91 MG/DL (ref 70–99)
HCT VFR BLD AUTO: 43.8 % (ref 40.5–52.5)
HGB BLD-MCNC: 14.7 G/DL (ref 13.5–17.5)
LYMPHOCYTES # BLD: 1.7 K/UL (ref 1–5.1)
LYMPHOCYTES NFR BLD: 25.6 %
MCH RBC QN AUTO: 30 PG (ref 26–34)
MCHC RBC AUTO-ENTMCNC: 33.7 G/DL (ref 31–36)
MCV RBC AUTO: 89.2 FL (ref 80–100)
MONOCYTES # BLD: 0.5 K/UL (ref 0–1.3)
MONOCYTES NFR BLD: 7.2 %
NEUTROPHILS # BLD: 4.2 K/UL (ref 1.7–7.7)
NEUTROPHILS NFR BLD: 64.4 %
PLATELET # BLD AUTO: 161 K/UL (ref 135–450)
PMV BLD AUTO: 9.8 FL (ref 5–10.5)
POTASSIUM SERPL-SCNC: 4 MMOL/L (ref 3.5–5.1)
PROT SERPL-MCNC: 7.4 G/DL (ref 6.4–8.2)
RBC # BLD AUTO: 4.91 M/UL (ref 4.2–5.9)
SODIUM SERPL-SCNC: 139 MMOL/L (ref 136–145)
WBC # BLD AUTO: 6.5 K/UL (ref 4–11)

## 2024-04-01 PROCEDURE — 36415 COLL VENOUS BLD VENIPUNCTURE: CPT

## 2024-04-01 PROCEDURE — 80053 COMPREHEN METABOLIC PANEL: CPT

## 2024-04-01 PROCEDURE — 85025 COMPLETE CBC W/AUTO DIFF WBC: CPT

## 2024-04-08 ENCOUNTER — HOSPITAL ENCOUNTER (OUTPATIENT)
Age: 54
Setting detail: SPECIMEN
Discharge: HOME | End: 2024-04-08
Payer: MEDICAID

## 2024-04-08 LAB
ALBUMIN SERPL-MCNC: 4.1 G/DL (ref 3.4–5)
ALBUMIN/GLOB SERPL: 1.2 {RATIO} (ref 1.1–2.2)
ALP SERPL-CCNC: 94 U/L (ref 40–129)
ALT SERPL-CCNC: 10 U/L (ref 10–40)
ANION GAP SERPL CALCULATED.3IONS-SCNC: 10 MMOL/L (ref 3–16)
AST SERPL-CCNC: 17 U/L (ref 15–37)
BASOPHILS # BLD: 0 K/UL (ref 0–0.2)
BASOPHILS NFR BLD: 0.5 %
BILIRUB SERPL-MCNC: 0.3 MG/DL (ref 0–1)
BUN SERPL-MCNC: 11 MG/DL (ref 7–20)
CALCIUM SERPL-MCNC: 9 MG/DL (ref 8.3–10.6)
CHLORIDE SERPL-SCNC: 105 MMOL/L (ref 99–110)
CO2 SERPL-SCNC: 26 MMOL/L (ref 21–32)
CREAT SERPL-MCNC: <0.5 MG/DL (ref 0.9–1.3)
DEPRECATED RDW RBC AUTO: 13.6 % (ref 12.4–15.4)
EOSINOPHIL # BLD: 0.2 K/UL (ref 0–0.6)
EOSINOPHIL NFR BLD: 2.1 %
GFR SERPLBLD CREATININE-BSD FMLA CKD-EPI: >90 ML/MIN/{1.73_M2}
GLUCOSE SERPL-MCNC: 106 MG/DL (ref 70–99)
HCT VFR BLD AUTO: 43.6 % (ref 40.5–52.5)
HGB BLD-MCNC: 14.7 G/DL (ref 13.5–17.5)
LYMPHOCYTES # BLD: 1.8 K/UL (ref 1–5.1)
LYMPHOCYTES NFR BLD: 22.8 %
MCH RBC QN AUTO: 29.9 PG (ref 26–34)
MCHC RBC AUTO-ENTMCNC: 33.8 G/DL (ref 31–36)
MCV RBC AUTO: 88.5 FL (ref 80–100)
MONOCYTES # BLD: 0.7 K/UL (ref 0–1.3)
MONOCYTES NFR BLD: 8.7 %
NEUTROPHILS # BLD: 5.2 K/UL (ref 1.7–7.7)
NEUTROPHILS NFR BLD: 65.9 %
PLATELET # BLD AUTO: 161 K/UL (ref 135–450)
PMV BLD AUTO: 9.9 FL (ref 5–10.5)
POTASSIUM SERPL-SCNC: 3.9 MMOL/L (ref 3.5–5.1)
PROT SERPL-MCNC: 7.4 G/DL (ref 6.4–8.2)
RBC # BLD AUTO: 4.93 M/UL (ref 4.2–5.9)
SODIUM SERPL-SCNC: 141 MMOL/L (ref 136–145)
WBC # BLD AUTO: 8 K/UL (ref 4–11)

## 2024-04-08 PROCEDURE — 80053 COMPREHEN METABOLIC PANEL: CPT

## 2024-04-08 PROCEDURE — 36415 COLL VENOUS BLD VENIPUNCTURE: CPT

## 2024-04-08 PROCEDURE — 85025 COMPLETE CBC W/AUTO DIFF WBC: CPT

## 2024-04-15 ENCOUNTER — HOSPITAL ENCOUNTER (OUTPATIENT)
Age: 54
Setting detail: SPECIMEN
Discharge: HOME | End: 2024-04-15
Payer: MEDICAID

## 2024-04-15 LAB
ALBUMIN SERPL-MCNC: 4.2 G/DL (ref 3.4–5)
ALBUMIN/GLOB SERPL: 1.4 {RATIO} (ref 1.1–2.2)
ALP SERPL-CCNC: 94 U/L (ref 40–129)
ALT SERPL-CCNC: 7 U/L (ref 10–40)
ANION GAP SERPL CALCULATED.3IONS-SCNC: 14 MMOL/L (ref 3–16)
AST SERPL-CCNC: 17 U/L (ref 15–37)
BASOPHILS # BLD: 0 K/UL (ref 0–0.2)
BASOPHILS NFR BLD: 0.6 %
BILIRUB SERPL-MCNC: 0.4 MG/DL (ref 0–1)
BUN SERPL-MCNC: 13 MG/DL (ref 7–20)
CALCIUM SERPL-MCNC: 9.2 MG/DL (ref 8.3–10.6)
CHLORIDE SERPL-SCNC: 100 MMOL/L (ref 99–110)
CO2 SERPL-SCNC: 24 MMOL/L (ref 21–32)
CREAT SERPL-MCNC: 0.6 MG/DL (ref 0.9–1.3)
DEPRECATED RDW RBC AUTO: 13.8 % (ref 12.4–15.4)
EOSINOPHIL # BLD: 0.1 K/UL (ref 0–0.6)
EOSINOPHIL NFR BLD: 1.8 %
GFR SERPLBLD CREATININE-BSD FMLA CKD-EPI: >90 ML/MIN/{1.73_M2}
GLUCOSE SERPL-MCNC: 82 MG/DL (ref 70–99)
HCT VFR BLD AUTO: 41.4 % (ref 40.5–52.5)
HGB BLD-MCNC: 14.1 G/DL (ref 13.5–17.5)
LYMPHOCYTES # BLD: 1.3 K/UL (ref 1–5.1)
LYMPHOCYTES NFR BLD: 20.3 %
MCH RBC QN AUTO: 29.9 PG (ref 26–34)
MCHC RBC AUTO-ENTMCNC: 34 G/DL (ref 31–36)
MCV RBC AUTO: 88 FL (ref 80–100)
MONOCYTES # BLD: 0.5 K/UL (ref 0–1.3)
MONOCYTES NFR BLD: 8.3 %
NEUTROPHILS # BLD: 4.4 K/UL (ref 1.7–7.7)
NEUTROPHILS NFR BLD: 69 %
PLATELET # BLD AUTO: 154 K/UL (ref 135–450)
PMV BLD AUTO: 9.7 FL (ref 5–10.5)
POTASSIUM SERPL-SCNC: 3.8 MMOL/L (ref 3.5–5.1)
PROT SERPL-MCNC: 7.1 G/DL (ref 6.4–8.2)
RBC # BLD AUTO: 4.7 M/UL (ref 4.2–5.9)
SODIUM SERPL-SCNC: 138 MMOL/L (ref 136–145)
WBC # BLD AUTO: 6.4 K/UL (ref 4–11)

## 2024-04-15 PROCEDURE — 85025 COMPLETE CBC W/AUTO DIFF WBC: CPT

## 2024-04-15 PROCEDURE — 80053 COMPREHEN METABOLIC PANEL: CPT

## 2024-04-15 PROCEDURE — 36415 COLL VENOUS BLD VENIPUNCTURE: CPT

## 2024-04-22 ENCOUNTER — HOSPITAL ENCOUNTER (OUTPATIENT)
Age: 54
Setting detail: SPECIMEN
Discharge: HOME | End: 2024-04-22
Payer: MEDICAID

## 2024-04-22 LAB
ALBUMIN SERPL-MCNC: 4.2 G/DL (ref 3.4–5)
ALBUMIN/GLOB SERPL: 1.3 {RATIO} (ref 1.1–2.2)
ALP SERPL-CCNC: 91 U/L (ref 40–129)
ALT SERPL-CCNC: 7 U/L (ref 10–40)
ANION GAP SERPL CALCULATED.3IONS-SCNC: 11 MMOL/L (ref 3–16)
AST SERPL-CCNC: 13 U/L (ref 15–37)
BASOPHILS # BLD: 0 K/UL (ref 0–0.2)
BASOPHILS NFR BLD: 0.4 %
BILIRUB SERPL-MCNC: 0.3 MG/DL (ref 0–1)
BUN SERPL-MCNC: 11 MG/DL (ref 7–20)
CALCIUM SERPL-MCNC: 9.3 MG/DL (ref 8.3–10.6)
CHLORIDE SERPL-SCNC: 105 MMOL/L (ref 99–110)
CO2 SERPL-SCNC: 26 MMOL/L (ref 21–32)
CREAT SERPL-MCNC: <0.5 MG/DL (ref 0.9–1.3)
DEPRECATED RDW RBC AUTO: 13.7 % (ref 12.4–15.4)
EOSINOPHIL # BLD: 0.1 K/UL (ref 0–0.6)
EOSINOPHIL NFR BLD: 1.9 %
GFR SERPLBLD CREATININE-BSD FMLA CKD-EPI: >90 ML/MIN/{1.73_M2}
GLUCOSE SERPL-MCNC: 98 MG/DL (ref 70–99)
HCT VFR BLD AUTO: 43.3 % (ref 40.5–52.5)
HGB BLD-MCNC: 14.8 G/DL (ref 13.5–17.5)
LYMPHOCYTES # BLD: 1.6 K/UL (ref 1–5.1)
LYMPHOCYTES NFR BLD: 22.6 %
MCH RBC QN AUTO: 30.3 PG (ref 26–34)
MCHC RBC AUTO-ENTMCNC: 34.1 G/DL (ref 31–36)
MCV RBC AUTO: 88.7 FL (ref 80–100)
MONOCYTES # BLD: 0.6 K/UL (ref 0–1.3)
MONOCYTES NFR BLD: 7.9 %
NEUTROPHILS # BLD: 4.7 K/UL (ref 1.7–7.7)
NEUTROPHILS NFR BLD: 67.2 %
PLATELET # BLD AUTO: 159 K/UL (ref 135–450)
PMV BLD AUTO: 10.1 FL (ref 5–10.5)
POTASSIUM SERPL-SCNC: 4.2 MMOL/L (ref 3.5–5.1)
PROT SERPL-MCNC: 7.5 G/DL (ref 6.4–8.2)
RBC # BLD AUTO: 4.88 M/UL (ref 4.2–5.9)
SODIUM SERPL-SCNC: 142 MMOL/L (ref 136–145)
WBC # BLD AUTO: 7.1 K/UL (ref 4–11)

## 2024-04-22 PROCEDURE — 80053 COMPREHEN METABOLIC PANEL: CPT

## 2024-04-22 PROCEDURE — 36415 COLL VENOUS BLD VENIPUNCTURE: CPT

## 2024-04-22 PROCEDURE — 85025 COMPLETE CBC W/AUTO DIFF WBC: CPT

## 2024-04-25 ENCOUNTER — TELEPHONE (OUTPATIENT)
Dept: CARDIOLOGY CLINIC | Age: 54
End: 2024-04-25

## 2024-04-25 NOTE — TELEPHONE ENCOUNTER
Fax letter received from the [facility] transplant center. Letter has been scanned into chart under media tab. Letter states that the pt is not ready to pursue heart transplant evaluation at this time, if the pt changes his mind he is welcome to seek a new referral. JOSEPH CLEMENT.

## 2024-04-29 ENCOUNTER — HOSPITAL ENCOUNTER (OUTPATIENT)
Age: 54
Setting detail: SPECIMEN
Discharge: HOME | End: 2024-04-29
Payer: MEDICAID

## 2024-04-29 LAB
ALBUMIN SERPL-MCNC: 4.2 G/DL (ref 3.4–5)
ALBUMIN/GLOB SERPL: 1.3 {RATIO} (ref 1.1–2.2)
ALP SERPL-CCNC: 93 U/L (ref 40–129)
ALT SERPL-CCNC: 16 U/L (ref 10–40)
ANION GAP SERPL CALCULATED.3IONS-SCNC: 11 MMOL/L (ref 3–16)
AST SERPL-CCNC: 30 U/L (ref 15–37)
BASOPHILS # BLD: 0 K/UL (ref 0–0.2)
BASOPHILS NFR BLD: 0.5 %
BILIRUB SERPL-MCNC: 0.3 MG/DL (ref 0–1)
BUN SERPL-MCNC: 13 MG/DL (ref 7–20)
CALCIUM SERPL-MCNC: 9.4 MG/DL (ref 8.3–10.6)
CHLORIDE SERPL-SCNC: 102 MMOL/L (ref 99–110)
CO2 SERPL-SCNC: 26 MMOL/L (ref 21–32)
CREAT SERPL-MCNC: 0.6 MG/DL (ref 0.9–1.3)
DEPRECATED RDW RBC AUTO: 14.3 % (ref 12.4–15.4)
EOSINOPHIL # BLD: 0.1 K/UL (ref 0–0.6)
EOSINOPHIL NFR BLD: 1.4 %
GFR SERPLBLD CREATININE-BSD FMLA CKD-EPI: >90 ML/MIN/{1.73_M2}
GLUCOSE SERPL-MCNC: 67 MG/DL (ref 70–99)
HCT VFR BLD AUTO: 42.4 % (ref 40.5–52.5)
HGB BLD-MCNC: 14.2 G/DL (ref 13.5–17.5)
LYMPHOCYTES # BLD: 1.3 K/UL (ref 1–5.1)
LYMPHOCYTES NFR BLD: 17.8 %
MCH RBC QN AUTO: 30.2 PG (ref 26–34)
MCHC RBC AUTO-ENTMCNC: 33.6 G/DL (ref 31–36)
MCV RBC AUTO: 89.9 FL (ref 80–100)
MONOCYTES # BLD: 0.7 K/UL (ref 0–1.3)
MONOCYTES NFR BLD: 8.8 %
NEUTROPHILS # BLD: 5.3 K/UL (ref 1.7–7.7)
NEUTROPHILS NFR BLD: 71.5 %
PLATELET # BLD AUTO: 152 K/UL (ref 135–450)
PMV BLD AUTO: 10 FL (ref 5–10.5)
POTASSIUM SERPL-SCNC: 4.4 MMOL/L (ref 3.5–5.1)
PROT SERPL-MCNC: 7.5 G/DL (ref 6.4–8.2)
RBC # BLD AUTO: 4.71 M/UL (ref 4.2–5.9)
SODIUM SERPL-SCNC: 139 MMOL/L (ref 136–145)
WBC # BLD AUTO: 7.5 K/UL (ref 4–11)

## 2024-04-29 PROCEDURE — 80053 COMPREHEN METABOLIC PANEL: CPT

## 2024-04-29 PROCEDURE — 85025 COMPLETE CBC W/AUTO DIFF WBC: CPT

## 2024-05-06 ENCOUNTER — HOSPITAL ENCOUNTER (OUTPATIENT)
Age: 54
Discharge: HOME | End: 2024-05-06
Payer: MEDICAID

## 2024-05-06 LAB
BASOPHILS # BLD: 0.1 K/UL (ref 0–0.2)
BASOPHILS NFR BLD: 0.7 %
CRP SERPL-MCNC: <3 MG/L (ref 0–5.1)
DEPRECATED RDW RBC AUTO: 14 % (ref 12.4–15.4)
EOSINOPHIL # BLD: 0.1 K/UL (ref 0–0.6)
EOSINOPHIL NFR BLD: 1.2 %
HCT VFR BLD AUTO: 41.4 % (ref 40.5–52.5)
HGB BLD-MCNC: 14.2 G/DL (ref 13.5–17.5)
INR PPP: 1.7 (ref 0.85–1.15)
LYMPHOCYTES # BLD: 1.8 K/UL (ref 1–5.1)
LYMPHOCYTES NFR BLD: 18.5 %
MCH RBC QN AUTO: 30.1 PG (ref 26–34)
MCHC RBC AUTO-ENTMCNC: 34.2 G/DL (ref 31–36)
MCV RBC AUTO: 87.9 FL (ref 80–100)
MONOCYTES # BLD: 0.7 K/UL (ref 0–1.3)
MONOCYTES NFR BLD: 7.1 %
NEUTROPHILS # BLD: 7 K/UL (ref 1.7–7.7)
NEUTROPHILS NFR BLD: 72.5 %
PLATELET # BLD AUTO: 163 K/UL (ref 135–450)
PMV BLD AUTO: 9.1 FL (ref 5–10.5)
PROTHROMBIN TIME: 20.1 SEC (ref 11.9–14.9)
RBC # BLD AUTO: 4.72 M/UL (ref 4.2–5.9)
WBC # BLD AUTO: 9.6 K/UL (ref 4–11)

## 2024-05-06 PROCEDURE — 85025 COMPLETE CBC W/AUTO DIFF WBC: CPT

## 2024-05-06 PROCEDURE — 86140 C-REACTIVE PROTEIN: CPT

## 2024-05-06 PROCEDURE — 85610 PROTHROMBIN TIME: CPT

## 2024-05-10 ENCOUNTER — OFFICE VISIT (OUTPATIENT)
Dept: FAMILY MEDICINE CLINIC | Age: 54
End: 2024-05-10

## 2024-05-10 VITALS — WEIGHT: 222 LBS | BODY MASS INDEX: 28.5 KG/M2

## 2024-05-10 DIAGNOSIS — R06.2 WHEEZING: ICD-10-CM

## 2024-05-10 DIAGNOSIS — Z95.810 PRESENCE OF CARDIAC RESYNCHRONIZATION THERAPY DEFIBRILLATOR (CRT-D): ICD-10-CM

## 2024-05-10 DIAGNOSIS — I51.3 LEFT VENTRICULAR THROMBOSIS: Chronic | ICD-10-CM

## 2024-05-10 DIAGNOSIS — J43.2 CENTRILOBULAR EMPHYSEMA (HCC): ICD-10-CM

## 2024-05-10 DIAGNOSIS — J81.0 ACUTE PULMONARY EDEMA (HCC): ICD-10-CM

## 2024-05-10 DIAGNOSIS — I25.10 CORONARY ARTERY DISEASE INVOLVING NATIVE CORONARY ARTERY OF NATIVE HEART WITHOUT ANGINA PECTORIS: Primary | ICD-10-CM

## 2024-05-10 DIAGNOSIS — Z95.811 LEFT VENTRICULAR ASSIST DEVICE PRESENT (HCC): ICD-10-CM

## 2024-05-10 DIAGNOSIS — Z72.0 TOBACCO ABUSE: ICD-10-CM

## 2024-05-10 ASSESSMENT — PATIENT HEALTH QUESTIONNAIRE - PHQ9
SUM OF ALL RESPONSES TO PHQ QUESTIONS 1-9: 13
6. FEELING BAD ABOUT YOURSELF - OR THAT YOU ARE A FAILURE OR HAVE LET YOURSELF OR YOUR FAMILY DOWN: NOT AT ALL
SUM OF ALL RESPONSES TO PHQ QUESTIONS 1-9: 13
SUM OF ALL RESPONSES TO PHQ QUESTIONS 1-9: 13
9. THOUGHTS THAT YOU WOULD BE BETTER OFF DEAD, OR OF HURTING YOURSELF: NOT AT ALL
SUM OF ALL RESPONSES TO PHQ9 QUESTIONS 1 & 2: 5
5. POOR APPETITE OR OVEREATING: MORE THAN HALF THE DAYS
2. FEELING DOWN, DEPRESSED OR HOPELESS: NEARLY EVERY DAY
10. IF YOU CHECKED OFF ANY PROBLEMS, HOW DIFFICULT HAVE THESE PROBLEMS MADE IT FOR YOU TO DO YOUR WORK, TAKE CARE OF THINGS AT HOME, OR GET ALONG WITH OTHER PEOPLE: NOT DIFFICULT AT ALL
4. FEELING TIRED OR HAVING LITTLE ENERGY: NEARLY EVERY DAY
3. TROUBLE FALLING OR STAYING ASLEEP: NEARLY EVERY DAY
8. MOVING OR SPEAKING SO SLOWLY THAT OTHER PEOPLE COULD HAVE NOTICED. OR THE OPPOSITE, BEING SO FIGETY OR RESTLESS THAT YOU HAVE BEEN MOVING AROUND A LOT MORE THAN USUAL: NOT AT ALL
7. TROUBLE CONCENTRATING ON THINGS, SUCH AS READING THE NEWSPAPER OR WATCHING TELEVISION: NOT AT ALL
SUM OF ALL RESPONSES TO PHQ QUESTIONS 1-9: 13
1. LITTLE INTEREST OR PLEASURE IN DOING THINGS: MORE THAN HALF THE DAYS

## 2024-05-10 ASSESSMENT — ENCOUNTER SYMPTOMS
EYE DISCHARGE: 0
COUGH: 0
CONSTIPATION: 0
ABDOMINAL PAIN: 0
SORE THROAT: 0
SHORTNESS OF BREATH: 1
CHEST TIGHTNESS: 0
EYE PAIN: 0
ABDOMINAL DISTENTION: 0
COLOR CHANGE: 0
DIARRHEA: 0

## 2024-05-10 NOTE — PROGRESS NOTES
current facility-administered medications on file prior to visit.        Allergies   Allergen Reactions    Statins Other (See Comments)     Muscle weakness         Past Medical History:   Diagnosis Date    Anxiety     CAD (coronary artery disease)     Chronic systolic (congestive) heart failure (HCC)     Depression     GERD (gastroesophageal reflux disease)     Heart attack (HCC)     Hyperlipidemia     Hypertension     Insomnia     Neck pain        Patient Active Problem List   Diagnosis    Acute systolic (congestive) heart failure (HCC)    Coronary artery disease involving native coronary artery of native heart without angina pectoris    SOB (shortness of breath)    Elevated brain natriuretic peptide (BNP) level    Acute pulmonary edema (HCC)    Cardiomegaly    Ischemic cardiomyopathy    Left ventricular thrombosis    Hyponatremia    Leukocytosis    Chronic systolic (congestive) heart failure (HCC)    Presence of cardiac resynchronization therapy defibrillator (CRT-D)    Tobacco abuse    Centrilobular emphysema (HCC)    Left ventricular assist device present (HCC)       Past Surgical History:   Procedure Laterality Date    CARDIAC SURGERY      2012 at [facility] [place] stent placed     CERVICAL FUSION      2009 at [facility]     LEFT VENTRICULAR ASSIST DEVICE         Social History     Socioeconomic History    Marital status: Legally      Spouse name: Not on file    Number of children: 3    Years of education: 12    Highest education level: Not on file   Occupational History    Not on file   Tobacco Use    Smoking status: Every Day     Current packs/day: 0.25     Average packs/day: 0.3 packs/day for 28.0 years (7.0 ttl pk-yrs)     Types: Cigarettes    Smokeless tobacco: Never   Vaping Use    Vaping Use: Never used   Substance and Sexual Activity    Alcohol use: Not Currently    Drug use: Yes     Types: Marijuana (Weed)    Sexual activity: Not Currently   Other Topics Concern    Not on file   Social History

## 2024-05-20 ENCOUNTER — OFFICE VISIT (OUTPATIENT)
Dept: FAMILY MEDICINE CLINIC | Age: 54
End: 2024-05-20
Payer: MEDICAID

## 2024-05-20 VITALS — BODY MASS INDEX: 29.02 KG/M2 | WEIGHT: 226 LBS

## 2024-05-20 DIAGNOSIS — S01.112S LACERATION OF LEFT EYEBROW, SEQUELA: Primary | ICD-10-CM

## 2024-05-20 PROCEDURE — G8427 DOCREV CUR MEDS BY ELIG CLIN: HCPCS

## 2024-05-20 PROCEDURE — 3017F COLORECTAL CA SCREEN DOC REV: CPT

## 2024-05-20 PROCEDURE — 99213 OFFICE O/P EST LOW 20 MIN: CPT

## 2024-05-20 PROCEDURE — G8419 CALC BMI OUT NRM PARAM NOF/U: HCPCS

## 2024-05-20 PROCEDURE — 4004F PT TOBACCO SCREEN RCVD TLK: CPT

## 2024-05-20 ASSESSMENT — ENCOUNTER SYMPTOMS
RESPIRATORY NEGATIVE: 1
GASTROINTESTINAL NEGATIVE: 1
EYE PAIN: 1

## 2024-05-20 NOTE — PROGRESS NOTES
[facility]  2024    Austen Mayer (:  1970) is a 53 y.o. male, here for evaluation of the following medical concerns:    Chief Complaint   Patient presents with    Suture / Staple Removal        ASSESSMENT/ PLAN  1. Laceration of left eyebrow, sequela  -x4 of 5 stiches removed. Will follow up with staff tomorrow for surgery to remove.   -unable to remove last suture.              No follow-ups on file.    HPI  Here for suture removals post fall. X5 stiches present.     ROS  Review of Systems   Constitutional: Negative.    HENT: Negative.     Eyes:  Positive for pain.        Bruised     Respiratory: Negative.     Cardiovascular: Negative.    Gastrointestinal: Negative.        HISTORIES  Current Outpatient Medications on File Prior to Visit   Medication Sig Dispense Refill    gabapentin (NEURONTIN) 300 MG capsule Take 1 capsule by mouth 3 times daily.      sacubitril-valsartan (ENTRESTO) 24-26 MG per tablet Take 1 tablet by mouth 2 times daily      sertraline (ZOLOFT) 100 MG tablet Take 1 tablet by mouth daily      QUEtiapine (SEROQUEL) 25 MG tablet Take 1-2 tablets nightly as needed to help with sleep      metoprolol succinate (TOPROL XL) 25 MG extended release tablet Take 1 tablet by mouth daily      pantoprazole (PROTONIX) 40 MG tablet Take 1 tablet by mouth daily      sildenafil (REVATIO) 20 MG tablet Take 1 tablet by mouth 3 times daily      warfarin (COUMADIN) 5 MG tablet Take by mouth daily 0.5 - 1.5 tabs as directed by LVAD MD      torsemide (DEMADEX) 20 MG tablet Take 1 tablet by mouth as needed (for wt gain of 3lbs or more)      atorvastatin (LIPITOR) 40 MG tablet       spironolactone (ALDACTONE) 25 MG tablet TAKE (1) TABLET DAILY (Patient taking differently: Take 1 tablet by mouth at bedtime Take 1-2 tabs nigthly) 30 tablet 11    aspirin 81 MG chewable tablet Take 1 tablet by mouth daily 30 tablet 3     No current facility-administered medications on file prior to visit.

## 2024-06-26 ENCOUNTER — HOSPITAL ENCOUNTER (OUTPATIENT)
Age: 54
Discharge: HOME | End: 2024-06-26
Payer: MEDICAID

## 2024-06-26 ENCOUNTER — HOSPITAL ENCOUNTER (OUTPATIENT)
Age: 54
End: 2024-06-26
Payer: MEDICAID

## 2024-06-26 ENCOUNTER — HOSPITAL ENCOUNTER (OUTPATIENT)
Dept: GENERAL RADIOLOGY | Age: 54
Discharge: HOME | End: 2024-06-26
Payer: MEDICAID

## 2024-06-26 DIAGNOSIS — M47.816 SPONDYLOSIS WITHOUT MYELOPATHY OR RADICULOPATHY, LUMBAR REGION: ICD-10-CM

## 2024-06-26 LAB
BASOPHILS # BLD: 0 K/UL (ref 0–0.2)
BASOPHILS NFR BLD: 0.7 %
CRP SERPL-MCNC: 6.8 MG/L (ref 0–5.1)
DEPRECATED RDW RBC AUTO: 14.3 % (ref 12.4–15.4)
EOSINOPHIL # BLD: 0.1 K/UL (ref 0–0.6)
EOSINOPHIL NFR BLD: 1.8 %
HCT VFR BLD AUTO: 43.4 % (ref 40.5–52.5)
HGB BLD-MCNC: 14.5 G/DL (ref 13.5–17.5)
INR PPP: 1.62 (ref 0.85–1.15)
LYMPHOCYTES # BLD: 1.5 K/UL (ref 1–5.1)
LYMPHOCYTES NFR BLD: 22 %
MCH RBC QN AUTO: 29.7 PG (ref 26–34)
MCHC RBC AUTO-ENTMCNC: 33.5 G/DL (ref 31–36)
MCV RBC AUTO: 88.8 FL (ref 80–100)
MONOCYTES # BLD: 0.5 K/UL (ref 0–1.3)
MONOCYTES NFR BLD: 7.6 %
NEUTROPHILS # BLD: 4.5 K/UL (ref 1.7–7.7)
NEUTROPHILS NFR BLD: 67.9 %
PLATELET # BLD AUTO: 175 K/UL (ref 135–450)
PMV BLD AUTO: 9.3 FL (ref 5–10.5)
PROTHROMBIN TIME: 19.4 SEC (ref 11.9–14.9)
RBC # BLD AUTO: 4.89 M/UL (ref 4.2–5.9)
WBC # BLD AUTO: 6.6 K/UL (ref 4–11)

## 2024-06-26 PROCEDURE — 85025 COMPLETE CBC W/AUTO DIFF WBC: CPT

## 2024-06-26 PROCEDURE — 72100 X-RAY EXAM L-S SPINE 2/3 VWS: CPT

## 2024-06-26 PROCEDURE — 85610 PROTHROMBIN TIME: CPT

## 2024-06-26 PROCEDURE — 86140 C-REACTIVE PROTEIN: CPT

## 2024-08-05 DIAGNOSIS — I25.10 CORONARY ARTERY DISEASE INVOLVING NATIVE CORONARY ARTERY OF NATIVE HEART WITHOUT ANGINA PECTORIS: ICD-10-CM

## 2024-08-05 RX ORDER — ATORVASTATIN CALCIUM 80 MG/1
TABLET, FILM COATED ORAL
Qty: 90 TABLET | Refills: 3 | OUTPATIENT
Start: 2024-08-05

## 2024-08-13 ENCOUNTER — TELEPHONE (OUTPATIENT)
Dept: FAMILY MEDICINE CLINIC | Age: 54
End: 2024-08-13

## 2024-08-13 NOTE — TELEPHONE ENCOUNTER
Patient had an appointment scheduled today with Edd Dixon CNP that he missed. Called patient to reschedule. LVM to call

## 2024-10-04 ENCOUNTER — HOSPITAL ENCOUNTER (OUTPATIENT)
Dept: CARDIAC REHAB | Age: 54
Setting detail: THERAPIES SERIES
Discharge: HOME | End: 2024-10-04
Payer: MEDICAID

## 2024-10-04 PROCEDURE — 93798 PHYS/QHP OP CAR RHAB W/ECG: CPT

## 2024-10-04 RX ORDER — OXYCODONE HYDROCHLORIDE 5 MG/1
5 TABLET ORAL EVERY 6 HOURS PRN
COMMUNITY
Start: 2024-08-26

## 2024-10-04 RX ORDER — DOXYCYCLINE 100 MG/1
100 CAPSULE ORAL 2 TIMES DAILY
COMMUNITY
Start: 2024-05-28

## 2024-10-04 RX ORDER — BUPROPION HYDROCHLORIDE 150 MG/1
150 TABLET ORAL DAILY
COMMUNITY
Start: 2024-10-01

## 2024-10-04 RX ORDER — MELATONIN/LEMON BALM LEAF EXTR 5MG-500MCG
1 TABLET ORAL EVERY 24 HOURS
COMMUNITY
Start: 2024-08-26

## 2024-10-04 ASSESSMENT — PATIENT HEALTH QUESTIONNAIRE - PHQ9
6. FEELING BAD ABOUT YOURSELF - OR THAT YOU ARE A FAILURE OR HAVE LET YOURSELF OR YOUR FAMILY DOWN: NOT AT ALL
5. POOR APPETITE OR OVEREATING: NEARLY EVERY DAY
7. TROUBLE CONCENTRATING ON THINGS, SUCH AS READING THE NEWSPAPER OR WATCHING TELEVISION: NOT AT ALL
2. FEELING DOWN, DEPRESSED OR HOPELESS: NOT AT ALL
10. IF YOU CHECKED OFF ANY PROBLEMS, HOW DIFFICULT HAVE THESE PROBLEMS MADE IT FOR YOU TO DO YOUR WORK, TAKE CARE OF THINGS AT HOME, OR GET ALONG WITH OTHER PEOPLE: SOMEWHAT DIFFICULT
9. THOUGHTS THAT YOU WOULD BE BETTER OFF DEAD, OR OF HURTING YOURSELF: NOT AT ALL
SUM OF ALL RESPONSES TO PHQ9 QUESTIONS 1 & 2: 0
SUM OF ALL RESPONSES TO PHQ QUESTIONS 1-9: 7
8. MOVING OR SPEAKING SO SLOWLY THAT OTHER PEOPLE COULD HAVE NOTICED. OR THE OPPOSITE, BEING SO FIGETY OR RESTLESS THAT YOU HAVE BEEN MOVING AROUND A LOT MORE THAN USUAL: NOT AT ALL
3. TROUBLE FALLING OR STAYING ASLEEP: NEARLY EVERY DAY
SUM OF ALL RESPONSES TO PHQ QUESTIONS 1-9: 7
SUM OF ALL RESPONSES TO PHQ QUESTIONS 1-9: 7
1. LITTLE INTEREST OR PLEASURE IN DOING THINGS: NOT AT ALL
4. FEELING TIRED OR HAVING LITTLE ENERGY: SEVERAL DAYS
SUM OF ALL RESPONSES TO PHQ QUESTIONS 1-9: 7

## 2024-10-04 NOTE — PLAN OF CARE
Cardiopulmonary Rehab Treatment Plan   Name: Austen Mayer Assessment Date: 10/4/2024   : 1970 Primary Diagnosis: Treatment Diagnosis 1: LVAD      Age: 53 y.o. Referring Physician:  Ranjit Ornelas   MRN: [id number] Primary Care Physician: Gerard Dixon, ERVIN - CNP   Treatment Diagnosis  Treatment Diagnosis 1: LVAD  LVAD Date: 23  Referral Date: 24  Significant Cardiovascular History: Previous PCI; History of heart failure    Individual Treatment Plan  ITP Visit Type: Initial assessment (10/4/24)  ITP Next Review Date: 10/04/24 (Initial 10/4/24)  Visit #/Total Visits:   EF%: 20 %  Risk Stratification: High  Retired, Read Only ITP: Exercise; Psychosocial; Nutrition; Education    Exercise Assessment  Stages of Change: Preparation  Assisted Devices: None  Test: Six minute walk test    Data Measured Before Test  Heart Rate: 77  Resting Blood Pressure: 98/0 (doppler used)  O2 Saturation: 97  O2 Device: Room air  RPD: 0    Data Measured During Test  Indicate Mode of RPE: 6 minutes/submax  Describe Type of Pain You Are Having: bilater hip pain    Data Measured at Peak  Distance Calculated in : ft  Distance (Feet-Actual): 1380 ft  Peak Heart Rate: 95  Peak Blood Pressure: 110/0  Peak RPE: 13  SpO2: 94  O2 Flow Rate (L/min): 0 l/min    Data Measured at 5 Minutes After Test  Heart Rate: 76  Blood Pressure: 89/0  SpO2: 96 %  O2 Device: Room air    Exercise Intervention/Prescription  Mode: Track; Treadmill; Bike; Stepper; Ergometer; Elliptical; Rower  Frequency per week: 2-3 supervised sessions weekly  Duration Per Session: 20-36+ minutes of steady aerobic exercise  Intensity METS      : 3-6 (Increase workloads 0.5-1.0 METS/weekly)  RPE: 11-14  Progression: Start w/1-2 sets of 8-12 repetitions for ea. muscle group. Use1-5# initially (very light resistance).  Resistance Training: Yes    Retired, Read Only Exercise Blood Pressures  Retired, Read Only Resting BP: 88/0  Retired, Read Only Is

## 2024-10-07 ENCOUNTER — APPOINTMENT (OUTPATIENT)
Dept: CARDIAC REHAB | Age: 54
End: 2024-10-07
Payer: MEDICAID

## 2024-10-09 ENCOUNTER — APPOINTMENT (OUTPATIENT)
Dept: CARDIAC REHAB | Age: 54
End: 2024-10-09
Payer: MEDICAID

## 2024-10-11 ENCOUNTER — APPOINTMENT (OUTPATIENT)
Dept: CARDIAC REHAB | Age: 54
End: 2024-10-11
Payer: MEDICAID

## 2024-10-14 ENCOUNTER — TELEPHONE (OUTPATIENT)
Dept: CARDIAC REHAB | Age: 54
End: 2024-10-14

## 2024-10-14 ENCOUNTER — HOSPITAL ENCOUNTER (OUTPATIENT)
Dept: CARDIAC REHAB | Age: 54
Setting detail: THERAPIES SERIES
End: 2024-10-14
Payer: MEDICAID

## 2024-10-16 ENCOUNTER — HOSPITAL ENCOUNTER (OUTPATIENT)
Dept: CARDIAC REHAB | Age: 54
Setting detail: THERAPIES SERIES
Discharge: HOME | End: 2024-10-16
Payer: MEDICAID

## 2024-10-16 PROCEDURE — 93798 PHYS/QHP OP CAR RHAB W/ECG: CPT

## 2024-10-21 ENCOUNTER — HOSPITAL ENCOUNTER (OUTPATIENT)
Dept: CARDIAC REHAB | Age: 54
Setting detail: THERAPIES SERIES
End: 2024-10-21
Payer: MEDICAID

## 2024-10-22 ENCOUNTER — TELEPHONE (OUTPATIENT)
Dept: CARDIAC REHAB | Age: 54
End: 2024-10-22

## 2024-10-22 NOTE — TELEPHONE ENCOUNTER
Patient called and reports he is having trouble sleeping and needs a different class time. Patient moving cardiac rehab appointments to 14:30 arrival time starting 10/23/24.

## 2024-10-23 ENCOUNTER — APPOINTMENT (OUTPATIENT)
Dept: CARDIAC REHAB | Age: 54
End: 2024-10-23
Payer: MEDICAID

## 2024-10-25 ENCOUNTER — APPOINTMENT (OUTPATIENT)
Dept: CARDIAC REHAB | Age: 54
End: 2024-10-25
Payer: MEDICAID

## 2024-10-25 ENCOUNTER — HOSPITAL ENCOUNTER (OUTPATIENT)
Dept: CARDIAC REHAB | Age: 54
Setting detail: THERAPIES SERIES
Discharge: HOME | End: 2024-10-25
Payer: MEDICAID

## 2024-10-25 PROCEDURE — 93798 PHYS/QHP OP CAR RHAB W/ECG: CPT

## 2024-10-28 ENCOUNTER — APPOINTMENT (OUTPATIENT)
Dept: CARDIAC REHAB | Age: 54
End: 2024-10-28
Payer: MEDICAID

## 2024-10-30 ENCOUNTER — APPOINTMENT (OUTPATIENT)
Dept: CARDIAC REHAB | Age: 54
End: 2024-10-30
Payer: MEDICAID

## 2024-11-01 ENCOUNTER — TELEPHONE (OUTPATIENT)
Dept: CARDIAC REHAB | Age: 54
End: 2024-11-01

## 2024-11-10 ENCOUNTER — HOSPITAL ENCOUNTER (OUTPATIENT)
Dept: CT IMAGING | Age: 54
Discharge: HOME | End: 2024-11-10
Payer: MEDICAID

## 2024-11-10 DIAGNOSIS — M54.16 LUMBAR RADICULOPATHY: ICD-10-CM

## 2024-11-10 PROCEDURE — 72131 CT LUMBAR SPINE W/O DYE: CPT

## 2024-11-11 ENCOUNTER — TELEPHONE (OUTPATIENT)
Dept: CARDIAC REHAB | Age: 54
End: 2024-11-11

## 2025-05-19 ENCOUNTER — TELEPHONE (OUTPATIENT)
Dept: CARDIOLOGY CLINIC | Age: 55
End: 2025-05-19

## 2025-05-19 NOTE — TELEPHONE ENCOUNTER
LMOV asking for call back. Looks like he has been following with [facility] cardiology, need to know whether or not he would like to following with them for his device. If not, need to get a patient in with [facility]. Has not been seen in our office since 2022. OK to overbook [facility] 6/17 @ [facility]XM

## 2025-05-19 NOTE — TELEPHONE ENCOUNTER
----- Message from KATHIE DAVIS sent at 5/13/2025 10:14 AM EDT -----  Regarding: Pt having increased episodes of NSVT/SVT noted on RM.  Pt having increased episodes of NSVT/SVT.   pt had LVAD @ [facility] 6/2023.   LVAD/CHF followed at [facility]//12.28.23//kj  COUMADIN 5/2024  TOPROL XL 5/2024  AC for LV thrombus     LOV 12/2022 w/ INDY.    INDY do you want him to schedule OV w/ you or EPNP?    Thanks     See MURJ report under cardiology tab once EP reviews.

## 2025-05-21 NOTE — TELEPHONE ENCOUNTER
Cynthia from [facility] cardiology called office to see if they could get pts last device transmission. Cynthia stated that pt was in an ambulance over the weekend and she was wanting to know if he was in afib. Pt no showed a recent appt he was suppose to have with them. Advised Cynthia that we have attempted to reach pt to see if he was going to follow us or [facility] cardiology. Cynthia is going to also help to reach pt to see what choice pt was going to make in his cardiology care. If any questions Cynthia can be reached at [phone]. If needed fax is [phone]

## 2025-07-08 ENCOUNTER — TELEPHONE (OUTPATIENT)
Dept: CARDIOLOGY CLINIC | Age: 55
End: 2025-07-08

## 2025-07-08 NOTE — TELEPHONE ENCOUNTER
Called and spoke to EP RN - she states that patient has not established w/ EP MD at [facility] yet, so they cannot  patient's remote monitoring. She will contact the patient/speak with MD about transferring remote monitoring and call back to the office with update.       Hailey CLEMENT

## 2025-07-08 NOTE — TELEPHONE ENCOUNTER
Please reach out to [facility] cardiac device management [phone].    Alejandrina Corea MP [id number] CRT-D [id number]    We are still receiving [email or web address] alerts (nsvt/svt) for his CRT-D. His remote monitoring was released by us 5/2025 per pt. request. He has an LVAD and being followed @ [facility] ADMISSIONS:  4/2025 LVAD  6/2025-Complication involving left ventricular assist device (LVAD), initial encounter   Complication involving left ventricular assist device (LVAD), initial encounter [T82.9XXA]    Procedures   KS DEBRIDEMENT SUBCUTANEOUS TISSUE 1ST 20 SQ CM/<   KS NEGATIVE PRESSURE WOUND THERAPY DME <= 50 SQ CM   Debridement Abdomen

## 2025-07-09 ENCOUNTER — HOSPITAL ENCOUNTER (OUTPATIENT)
Dept: LAB | Age: 55
Discharge: HOME | End: 2025-07-09
Payer: COMMERCIAL

## 2025-07-09 LAB
ALBUMIN SERPL-MCNC: 4.5 G/DL (ref 3.4–5)
ALBUMIN/GLOB SERPL: 1.4 {RATIO} (ref 1.1–2.2)
ALP SERPL-CCNC: 77 U/L (ref 40–129)
ALT SERPL-CCNC: 9 U/L (ref 10–40)
ANION GAP SERPL CALCULATED.3IONS-SCNC: 13 MMOL/L (ref 3–16)
AST SERPL-CCNC: 24 U/L (ref 15–37)
BASOPHILS # BLD: 0 K/UL (ref 0–0.2)
BASOPHILS NFR BLD: 0.5 %
BILIRUB SERPL-MCNC: 0.4 MG/DL (ref 0–1)
BUN SERPL-MCNC: 12 MG/DL (ref 7–20)
CALCIUM SERPL-MCNC: 9.6 MG/DL (ref 8.3–10.6)
CHLORIDE SERPL-SCNC: 104 MMOL/L (ref 99–110)
CO2 SERPL-SCNC: 22 MMOL/L (ref 21–32)
CREAT SERPL-MCNC: 0.6 MG/DL (ref 0.9–1.3)
DEPRECATED RDW RBC AUTO: 14.1 % (ref 12.4–15.4)
EOSINOPHIL # BLD: 0.1 K/UL (ref 0–0.6)
EOSINOPHIL NFR BLD: 1.8 %
GFR SERPLBLD CREATININE-BSD FMLA CKD-EPI: >90 ML/MIN/{1.73_M2}
GLUCOSE SERPL-MCNC: 117 MG/DL (ref 70–99)
HCT VFR BLD AUTO: 44.7 % (ref 40.5–52.5)
HGB BLD-MCNC: 15.2 G/DL (ref 13.5–17.5)
LYMPHOCYTES # BLD: 1.4 K/UL (ref 1–5.1)
LYMPHOCYTES NFR BLD: 21.3 %
MCH RBC QN AUTO: 29.2 PG (ref 26–34)
MCHC RBC AUTO-ENTMCNC: 33.9 G/DL (ref 31–36)
MCV RBC AUTO: 86.2 FL (ref 80–100)
MONOCYTES # BLD: 0.7 K/UL (ref 0–1.3)
MONOCYTES NFR BLD: 10.3 %
NEUTROPHILS # BLD: 4.4 K/UL (ref 1.7–7.7)
NEUTROPHILS NFR BLD: 66.1 %
PLATELET # BLD AUTO: 135 K/UL (ref 135–450)
PMV BLD AUTO: 9.4 FL (ref 5–10.5)
POTASSIUM SERPL-SCNC: 4.3 MMOL/L (ref 3.5–5.1)
PROT SERPL-MCNC: 7.8 G/DL (ref 6.4–8.2)
RBC # BLD AUTO: 5.19 M/UL (ref 4.2–5.9)
SODIUM SERPL-SCNC: 139 MMOL/L (ref 136–145)
WBC # BLD AUTO: 6.6 K/UL (ref 4–11)

## 2025-07-09 PROCEDURE — 85025 COMPLETE CBC W/AUTO DIFF WBC: CPT

## 2025-07-09 PROCEDURE — 80053 COMPREHEN METABOLIC PANEL: CPT
